# Patient Record
Sex: FEMALE | Race: WHITE | Employment: PART TIME | URBAN - METROPOLITAN AREA
[De-identification: names, ages, dates, MRNs, and addresses within clinical notes are randomized per-mention and may not be internally consistent; named-entity substitution may affect disease eponyms.]

---
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
## 2019-06-18 ENCOUNTER — OFFICE VISIT (OUTPATIENT)
Dept: FAMILY MEDICINE CLINIC | Age: 24
End: 2019-06-18
Payer: COMMERCIAL

## 2019-06-18 VITALS
HEIGHT: 62 IN | TEMPERATURE: 99 F | SYSTOLIC BLOOD PRESSURE: 92 MMHG | HEART RATE: 129 BPM | OXYGEN SATURATION: 98 % | BODY MASS INDEX: 23.59 KG/M2 | DIASTOLIC BLOOD PRESSURE: 66 MMHG | WEIGHT: 128.2 LBS

## 2019-06-18 DIAGNOSIS — R10.84 GENERALIZED ABDOMINAL PAIN: Primary | ICD-10-CM

## 2019-06-18 DIAGNOSIS — R63.4 WEIGHT LOSS: ICD-10-CM

## 2019-06-18 DIAGNOSIS — R30.0 DYSURIA: ICD-10-CM

## 2019-06-18 DIAGNOSIS — R19.7 DIARRHEA, UNSPECIFIED TYPE: ICD-10-CM

## 2019-06-18 LAB
BILIRUBIN, POC: NORMAL
BLOOD URINE, POC: NORMAL
CLARITY, POC: CLEAR
COLOR, POC: YELLOW
GLUCOSE URINE, POC: NORMAL
KETONES, POC: NORMAL
LEUKOCYTE EST, POC: NORMAL
NITRITE, POC: NORMAL
PH, POC: 5.5
PROTEIN, POC: NORMAL
SPECIFIC GRAVITY, POC: >=1.03
UROBILINOGEN, POC: 0.2

## 2019-06-18 PROCEDURE — 99202 OFFICE O/P NEW SF 15 MIN: CPT | Performed by: NURSE PRACTITIONER

## 2019-06-18 PROCEDURE — G8427 DOCREV CUR MEDS BY ELIG CLIN: HCPCS | Performed by: NURSE PRACTITIONER

## 2019-06-18 PROCEDURE — G8420 CALC BMI NORM PARAMETERS: HCPCS | Performed by: NURSE PRACTITIONER

## 2019-06-18 PROCEDURE — 1036F TOBACCO NON-USER: CPT | Performed by: NURSE PRACTITIONER

## 2019-06-18 PROCEDURE — 81002 URINALYSIS NONAUTO W/O SCOPE: CPT | Performed by: NURSE PRACTITIONER

## 2019-06-18 RX ORDER — DICYCLOMINE HYDROCHLORIDE 10 MG/1
10 CAPSULE ORAL 4 TIMES DAILY PRN
Qty: 30 CAPSULE | Refills: 0 | Status: SHIPPED | OUTPATIENT
Start: 2019-06-18 | End: 2019-09-20

## 2019-06-18 RX ORDER — TOPIRAMATE 50 MG/1
TABLET, FILM COATED ORAL
Refills: 5 | COMMUNITY
Start: 2019-06-01 | End: 2019-07-01 | Stop reason: SDUPTHER

## 2019-06-18 RX ORDER — MEDROXYPROGESTERONE ACETATE 150 MG/ML
150 INJECTION, SUSPENSION INTRAMUSCULAR
COMMUNITY
End: 2020-03-23

## 2019-06-18 ASSESSMENT — PATIENT HEALTH QUESTIONNAIRE - PHQ9
1. LITTLE INTEREST OR PLEASURE IN DOING THINGS: 0
SUM OF ALL RESPONSES TO PHQ QUESTIONS 1-9: 0
2. FEELING DOWN, DEPRESSED OR HOPELESS: 0
SUM OF ALL RESPONSES TO PHQ9 QUESTIONS 1 & 2: 0
SUM OF ALL RESPONSES TO PHQ QUESTIONS 1-9: 0

## 2019-06-18 ASSESSMENT — ENCOUNTER SYMPTOMS
RESPIRATORY NEGATIVE: 1
BLOOD IN STOOL: 0
NAUSEA: 0
FLATUS: 0
VOMITING: 0
HEMATOCHEZIA: 0
DIARRHEA: 1
ABDOMINAL PAIN: 1
BELCHING: 0
COUGH: 0
CONSTIPATION: 1
BLOATING: 0

## 2019-06-18 NOTE — PROGRESS NOTES
Saray Headings   26 y.o.  female  [id number]      Chief Complaint   Patient presents with    Abdominal Pain     x 2 days    Diarrhea        Subjective:  23 y. o.female is here for a follow up. She has the following chronic/acute medical problems:  Patient Active Problem List   Diagnosis    36 weeks gestation of pregnancy       Patient states she has had stomach pains with diarrhea after eating for four months. Over the last couple of days she has had the abdominal pain and diarrhea for the last two days. She has had no appetite and feels as if she is losing weight. Patient states she has lost 10 pounds. Abdominal Pain   This is a new problem. The current episode started more than 1 month ago (4 months ago). The onset quality is undetermined. Episode frequency: started intermittantly but feels as if constanct at this point. The problem has been gradually worsening. The pain is located in the generalized abdominal region. The pain is at a severity of 8/10. The quality of the pain is cramping. The abdominal pain does not radiate. Associated symptoms include constipation (sometimes), diarrhea, dysuria, a fever (per patient low grade), frequency and weight loss (per patient). Pertinent negatives include no anorexia, arthralgias, belching, flatus, headaches, hematochezia, hematuria, melena, myalgias, nausea or vomiting. Associated symptoms comments: Patient states when she wipes she will see blood on the toilet paper. . Exacerbated by: eating. The pain is relieved by nothing. She has tried nothing for the symptoms. Diarrhea    This is a new problem. The current episode started more than 1 month ago (4 months). Episode frequency: started after eating today she has went 12 times. The problem has been gradually worsening. The stool consistency is described as watery. The patient states that diarrhea does not awaken her from sleep.  Associated symptoms include abdominal pain, a fever (per patient low grade) and weight Urinalysis no Micro  - URINE CULTURE          Medications Discontinued During This Encounter   Medication Reason    ibuprofen (ADVIL;MOTRIN) 800 MG tablet LIST CLEANUP    Prenatal w/o A Vit-Fe Fum-FA (PRENATA PO) LIST CLEANUP           Care discussed with patient. Questions answered. Patient verbalizes understanding and agrees with plan. After visit summary provided. Advised to call for any problems, questions, or concerns. Return if symptoms worsen or fail to improve.                                               Signed:  MICAH Solano CNP  06/20/19  10:24 AM

## 2019-06-20 LAB — URINE CULTURE, ROUTINE: NORMAL

## 2019-06-23 RX ORDER — DOXYCYCLINE HYCLATE 100 MG/1
100 CAPSULE ORAL 2 TIMES DAILY
COMMUNITY
End: 2019-09-20

## 2019-07-01 ENCOUNTER — TELEPHONE (OUTPATIENT)
Dept: FAMILY MEDICINE CLINIC | Age: 24
End: 2019-07-01

## 2019-07-01 RX ORDER — TOPIRAMATE 50 MG/1
100 TABLET, FILM COATED ORAL EVERY EVENING
Qty: 60 TABLET | Refills: 0 | Status: SHIPPED | OUTPATIENT
Start: 2019-07-01 | End: 2019-07-30 | Stop reason: SDUPTHER

## 2019-07-01 NOTE — TELEPHONE ENCOUNTER
Requested Prescriptions     Signed Prescriptions Disp Refills    topiramate (TOPAMAX) 50 MG tablet 60 tablet 0     Sig: Take 2 tablets by mouth every evening     Authorizing Provider: Bryce Barber     Ordering User: Savanah Perea

## 2019-07-30 RX ORDER — TOPIRAMATE 50 MG/1
100 TABLET, FILM COATED ORAL EVERY EVENING
Qty: 60 TABLET | Refills: 1 | Status: SHIPPED | OUTPATIENT
Start: 2019-07-30 | End: 2019-09-20 | Stop reason: SDUPTHER

## 2019-09-20 ENCOUNTER — OFFICE VISIT (OUTPATIENT)
Dept: FAMILY MEDICINE CLINIC | Age: 24
End: 2019-09-20
Payer: COMMERCIAL

## 2019-09-20 VITALS
SYSTOLIC BLOOD PRESSURE: 104 MMHG | HEART RATE: 76 BPM | DIASTOLIC BLOOD PRESSURE: 72 MMHG | WEIGHT: 125 LBS | BODY MASS INDEX: 22.15 KG/M2 | HEIGHT: 63 IN

## 2019-09-20 DIAGNOSIS — K58.9 IRRITABLE BOWEL SYNDROME, UNSPECIFIED TYPE: ICD-10-CM

## 2019-09-20 DIAGNOSIS — R63.4 WEIGHT LOSS, NON-INTENTIONAL: ICD-10-CM

## 2019-09-20 DIAGNOSIS — G43.109 MIGRAINE AURA WITHOUT HEADACHE: Primary | ICD-10-CM

## 2019-09-20 DIAGNOSIS — L70.9 ACNE, UNSPECIFIED ACNE TYPE: ICD-10-CM

## 2019-09-20 PROCEDURE — G8420 CALC BMI NORM PARAMETERS: HCPCS | Performed by: FAMILY MEDICINE

## 2019-09-20 PROCEDURE — 1036F TOBACCO NON-USER: CPT | Performed by: FAMILY MEDICINE

## 2019-09-20 PROCEDURE — 99214 OFFICE O/P EST MOD 30 MIN: CPT | Performed by: FAMILY MEDICINE

## 2019-09-20 PROCEDURE — G8427 DOCREV CUR MEDS BY ELIG CLIN: HCPCS | Performed by: FAMILY MEDICINE

## 2019-09-20 RX ORDER — TOPIRAMATE 50 MG/1
100 TABLET, FILM COATED ORAL EVERY EVENING
Qty: 180 TABLET | Refills: 1 | Status: SHIPPED | OUTPATIENT
Start: 2019-09-20 | End: 2020-03-23 | Stop reason: SDUPTHER

## 2019-09-20 RX ORDER — CLINDAMYCIN PHOSPHATE 10 MG/G
GEL TOPICAL
Qty: 30 G | Refills: 3 | Status: SHIPPED | OUTPATIENT
Start: 2019-09-20 | End: 2020-03-23 | Stop reason: SDUPTHER

## 2019-09-20 ASSESSMENT — ENCOUNTER SYMPTOMS
SHORTNESS OF BREATH: 0
SINUS PRESSURE: 0
RHINORRHEA: 0
CHEST TIGHTNESS: 0
SORE THROAT: 0

## 2020-03-23 ENCOUNTER — OFFICE VISIT (OUTPATIENT)
Dept: FAMILY MEDICINE CLINIC | Age: 25
End: 2020-03-23
Payer: COMMERCIAL

## 2020-03-23 VITALS
BODY MASS INDEX: 22.15 KG/M2 | WEIGHT: 125 LBS | DIASTOLIC BLOOD PRESSURE: 60 MMHG | HEART RATE: 76 BPM | SYSTOLIC BLOOD PRESSURE: 94 MMHG | HEIGHT: 63 IN

## 2020-03-23 PROCEDURE — 99214 OFFICE O/P EST MOD 30 MIN: CPT | Performed by: FAMILY MEDICINE

## 2020-03-23 RX ORDER — DICYCLOMINE HYDROCHLORIDE 10 MG/1
10 CAPSULE ORAL 2 TIMES DAILY PRN
Qty: 40 CAPSULE | Refills: 3 | Status: SHIPPED | OUTPATIENT
Start: 2020-03-23 | End: 2020-07-29

## 2020-03-23 RX ORDER — CLINDAMYCIN PHOSPHATE 10 MG/G
GEL TOPICAL
Qty: 30 G | Refills: 3
Start: 2020-03-23 | End: 2020-03-30

## 2020-03-23 RX ORDER — SUMATRIPTAN 50 MG/1
50 TABLET, FILM COATED ORAL
Qty: 9 TABLET | Refills: 3 | Status: SHIPPED | OUTPATIENT
Start: 2020-03-23 | End: 2020-07-29

## 2020-03-23 RX ORDER — TOPIRAMATE 50 MG/1
100 TABLET, FILM COATED ORAL EVERY EVENING
Qty: 180 TABLET | Refills: 1
Start: 2020-03-23 | End: 2020-03-31 | Stop reason: SDUPTHER

## 2020-03-23 ASSESSMENT — PATIENT HEALTH QUESTIONNAIRE - PHQ9
1. LITTLE INTEREST OR PLEASURE IN DOING THINGS: 0
SUM OF ALL RESPONSES TO PHQ9 QUESTIONS 1 & 2: 0
SUM OF ALL RESPONSES TO PHQ QUESTIONS 1-9: 0
SUM OF ALL RESPONSES TO PHQ QUESTIONS 1-9: 0
2. FEELING DOWN, DEPRESSED OR HOPELESS: 0

## 2020-03-23 NOTE — PROGRESS NOTES
strain: Not on file    Food insecurity     Worry: Not on file     Inability: Not on file    Transportation needs     Medical: Not on file     Non-medical: Not on file   Tobacco Use    Smoking status: Never Smoker    Smokeless tobacco: Never Used   Substance and Sexual Activity    Alcohol use: No    Drug use: No    Sexual activity: Yes     Partners: Male   Lifestyle    Physical activity     Days per week: Not on file     Minutes per session: Not on file    Stress: Not on file   Relationships    Social connections     Talks on phone: Not on file     Gets together: Not on file     Attends Zoroastrianism service: Not on file     Active member of club or organization: Not on file     Attends meetings of clubs or organizations: Not on file     Relationship status: Not on file    Intimate partner violence     Fear of current or ex partner: Not on file     Emotionally abused: Not on file     Physically abused: Not on file     Forced sexual activity: Not on file   Other Topics Concern    Not on file   Social History Narrative    Not on file        SURGICAL HISTORY  Past Surgical History:   Procedure Laterality Date    APPENDECTOMY  08/2017    LYMPHADENECTOMY      TONSILLECTOMY         CURRENT MEDICATIONS  Current Outpatient Medications   Medication Sig Dispense Refill    SUMAtriptan (IMITREX) 50 MG tablet Take 1 tablet by mouth once as needed for Migraine 9 tablet 3    dicyclomine (BENTYL) 10 MG capsule Take 1 capsule by mouth 2 times daily as needed (abd cramps) 40 capsule 3    clindamycin (CLEOCIN-T) 1 % gel Apply topically at night after washing 30 g 3    topiramate (TOPAMAX) 50 MG tablet Take 2 tablets by mouth every evening 180 tablet 1     No current facility-administered medications for this visit.         ALLERGIES  No Known Allergies    PHYSICAL EXAM    BP 94/60   Pulse 76   Ht 5' 3\" (1.6 m)   Wt 125 lb (56.7 kg)   BMI 22.14 kg/m²     Constitutional:  Well developed, well nourished  HEENT: Normocephalic, atraumatic, bilateral external ears normal, oropharynx moist, nose normal  Neck:  Normal range of motion, no tenderness, supple  Lymphatic:  No lymphadenopathy noted  Cardiovascular:  Normal heart rate, S1S2 nl  Thorax & Lungs:  Normal breath sounds, no respiratory distress, no wheezing  Skin:  Warm, dry, no erythema, no rash  Back:  straight  Extremities:  No edema, no tenderness, no cyanosis  Musculoskeletal:  Good range of motion   Neurologic:  Alert & oriented X 3      ASSESSMENT & PLAN    1. Migraine aura without headache  Continue Topamax. Patient doing well without significant side effects. Add a migraine abortive agent as needed    - SUMAtriptan (IMITREX) 50 MG tablet; Take 1 tablet by mouth once as needed for Migraine  Dispense: 9 tablet; Refill: 3  - topiramate (TOPAMAX) 50 MG tablet; Take 2 tablets by mouth every evening  Dispense: 180 tablet; Refill: 1    2. Irritable bowel syndrome, unspecified type  Stop soda pop. Decrease caffeine. Had a PRN Bentyl  - dicyclomine (BENTYL) 10 MG capsule; Take 1 capsule by mouth 2 times daily as needed (abd cramps)  Dispense: 40 capsule; Refill: 3    3. Acne vulgaris  Issue controlled. Continue meds. Refilled meds. - clindamycin (CLEOCIN-T) 1 % gel;  Apply topically at night after washing  Dispense: 30 g; Refill: 3    Follow-up 6 months       Electronically signed by Blue Epperson MD on 3/24/2020

## 2020-03-31 RX ORDER — TOPIRAMATE 50 MG/1
100 TABLET, FILM COATED ORAL EVERY EVENING
Qty: 180 TABLET | Refills: 1 | Status: SHIPPED | OUTPATIENT
Start: 2020-03-31 | End: 2020-07-29

## 2020-07-16 RX ORDER — CLINDAMYCIN PHOSPHATE 10 MG/G
GEL TOPICAL
Qty: 30 G | Refills: 0 | Status: SHIPPED | OUTPATIENT
Start: 2020-07-16 | End: 2020-07-29

## 2020-09-10 RX ORDER — CLINDAMYCIN PHOSPHATE 10 MG/G
GEL TOPICAL
Qty: 30 G | Refills: 0 | Status: SHIPPED | OUTPATIENT
Start: 2020-09-10 | End: 2020-09-14

## 2020-09-10 NOTE — TELEPHONE ENCOUNTER
Requested Prescriptions     Signed Prescriptions Disp Refills    clindamycin (CLINDAGEL) 1 % gel 30 g 0     Sig: APPLY TOPICALLY AT NIGHT AFTER WASHING     Authorizing Provider: Rae Aranda     Ordering User: Desiree Swan

## 2020-09-14 ENCOUNTER — TELEMEDICINE (OUTPATIENT)
Dept: FAMILY MEDICINE CLINIC | Age: 25
End: 2020-09-14
Payer: COMMERCIAL

## 2020-09-14 PROCEDURE — 99213 OFFICE O/P EST LOW 20 MIN: CPT | Performed by: FAMILY MEDICINE

## 2020-09-14 RX ORDER — CLINDAMYCIN PHOSPHATE 10 MG/G
GEL TOPICAL
Qty: 30 G | Refills: 5
Start: 2020-09-14 | End: 2020-09-20 | Stop reason: ALTCHOICE

## 2020-09-14 NOTE — PROGRESS NOTES
9/14/2020    [address]    Chief Complaint   Patient presents with    6 Month Follow-Up    Other     no c/o's       HPI    Aissatou Pitts is a 23 y.o. female who presents today with follow-up. REVIEW OF SYSTEMS    Constitutional:  Denies fever, chills, weight loss or weakness  Eyes:  no photophobia or discharge  ENT:  no sore throat or ear pain  Cardiovascular:  Denies chest pain, palpitations or swelling  Respiratory:  Denies cough or shortness of breath  GI:  no abdominal pain, nausea, vomiting, or diarrhea  Musculoskeletal:  no back pain  Skin:  No rashes  Neurologic:  no headache, focal weakness, or sensory changes  Endocrine:  no polyuria or polydipsia      PAST MEDICAL HISTORY  No past medical history on file.     FAMILY HISTORY  Family History   Problem Relation Age of Onset    Heart Disease Mother         pacemaker   Madlyn Guard Migraines Mother     Coronary Art Dis Other         GRANDFATHER AND GREAT GRANDFATHER (NOT SPECIFIED)    Diabetes Other         GRANDMOTHER - NON INSULIN DEPENDNET       SOCIAL HISTORY  Social History     Socioeconomic History    Marital status: Single     Spouse name: Not on file    Number of children: Not on file    Years of education: Not on file    Highest education level: Not on file   Occupational History    Not on file   Social Needs    Financial resource strain: Not on file    Food insecurity     Worry: Not on file     Inability: Not on file    Transportation needs     Medical: Not on file     Non-medical: Not on file   Tobacco Use    Smoking status: Never Smoker    Smokeless tobacco: Never Used   Substance and Sexual Activity    Alcohol use: No    Drug use: No    Sexual activity: Yes     Partners: Male   Lifestyle    Physical activity     Days per week: Not on file     Minutes per session: Not on file    Stress: Not on file   Relationships    Social connections     Talks on phone: Not on file     Gets together: Not on file     Attends Episcopalian service: Not on file     Active member of club or organization: Not on file     Attends meetings of clubs or organizations: Not on file     Relationship status: Not on file    Intimate partner violence     Fear of current or ex partner: Not on file     Emotionally abused: Not on file     Physically abused: Not on file     Forced sexual activity: Not on file   Other Topics Concern    Not on file   Social History Narrative    Not on file        SURGICAL HISTORY  Past Surgical History:   Procedure Laterality Date    APPENDECTOMY  08/2017    [address]  No current outpatient medications on file. No current facility-administered medications for this visit. ALLERGIES  No Known Allergies    PHYSICAL EXAM    There were no vitals taken for this visit. Constitutional:  Well developed, well nourished  HEENT:  Normocephalic, atraumatic, bilateral external ears normal, oropharynx moist, nose normal  Neck:  Normal range of motion, no tenderness, supple  Lymphatic:  No lymphadenopathy noted  Cardiovascular:  Normal heart rate, S1S2 nl  Thorax & Lungs:  Normal breath sounds, no respiratory distress, no wheezing  Skin:  Warm, dry, no erythema, no rash  Back:  straight  Extremities:  No edema, no tenderness, no cyanosis  Musculoskeletal:  Good range of motion   Neurologic:  Alert & oriented X 3      ASSESSMENT & PLAN    There are no diagnoses linked to this encounter.          Electronically signed by Vince Cerna MD on 9/14/2020

## 2020-09-20 ENCOUNTER — HOSPITAL ENCOUNTER (EMERGENCY)
Age: 25
Discharge: HOME | End: 2020-09-20
Attending: EMERGENCY MEDICINE
Payer: COMMERCIAL

## 2020-09-20 VITALS
OXYGEN SATURATION: 99 % | HEIGHT: 63 IN | HEART RATE: 68 BPM | DIASTOLIC BLOOD PRESSURE: 60 MMHG | TEMPERATURE: 97.7 F | SYSTOLIC BLOOD PRESSURE: 122 MMHG | WEIGHT: 125 LBS | BODY MASS INDEX: 22.15 KG/M2 | RESPIRATION RATE: 12 BRPM

## 2020-09-20 LAB
BACTERIA: ABNORMAL /HPF
BILIRUBIN URINE: NEGATIVE MG/DL
BLOOD, URINE: NEGATIVE
CAST TYPE: ABNORMAL /HPF
CLARITY: CLEAR
COLOR: YELLOW
CRYSTAL TYPE: NEGATIVE /HPF
EPITHELIAL CELLS, UA: 6 /HPF
GLUCOSE, URINE: NEGATIVE MG/DL
INTERPRETATION: NORMAL
KETONES, URINE: NEGATIVE MG/DL
LEUKOCYTE ESTERASE, URINE: NEGATIVE
NITRITE URINE, QUANTITATIVE: NEGATIVE
PH, URINE: 6 (ref 5–8)
PREGNANCY, URINE: NEGATIVE
PROTEIN UA: NEGATIVE MG/DL
RBC URINE: 0 /HPF (ref 0–6)
SPECIFIC GRAVITY UA: 1.03 (ref 1–1.03)
SPECIFIC GRAVITY, URINE: 1.03 (ref 1–1.03)
UROBILINOGEN, URINE: 0.2 MG/DL (ref 0.2–1)
WBC UA: 1 /HPF (ref 0–5)

## 2020-09-20 PROCEDURE — 99284 EMERGENCY DEPT VISIT MOD MDM: CPT

## 2020-09-20 PROCEDURE — 81001 URINALYSIS AUTO W/SCOPE: CPT

## 2020-09-20 PROCEDURE — 81025 URINE PREGNANCY TEST: CPT

## 2020-09-20 RX ORDER — DOCUSATE SODIUM 100 MG/1
100 CAPSULE, LIQUID FILLED ORAL 2 TIMES DAILY
Qty: 30 CAPSULE | Refills: 0 | Status: SHIPPED | OUTPATIENT
Start: 2020-09-20 | End: 2021-02-09

## 2020-09-20 RX ORDER — DICYCLOMINE HYDROCHLORIDE 10 MG/1
10 CAPSULE ORAL 3 TIMES DAILY
Qty: 15 CAPSULE | Refills: 3 | Status: SHIPPED | OUTPATIENT
Start: 2020-09-20 | End: 2021-02-09

## 2020-09-20 RX ORDER — POLYETHYLENE GLYCOL 3350 17 G/17G
17 POWDER, FOR SOLUTION ORAL DAILY PRN
Qty: 527 G | Refills: 1 | Status: SHIPPED | OUTPATIENT
Start: 2020-09-20 | End: 2020-10-20

## 2020-09-20 ASSESSMENT — PAIN DESCRIPTION - PROGRESSION: CLINICAL_PROGRESSION: GRADUALLY WORSENING

## 2020-09-20 ASSESSMENT — PAIN DESCRIPTION - ORIENTATION: ORIENTATION: LOWER

## 2020-09-20 ASSESSMENT — PAIN SCALES - GENERAL: PAINLEVEL_OUTOF10: 8

## 2020-09-20 ASSESSMENT — PAIN DESCRIPTION - FREQUENCY: FREQUENCY: CONTINUOUS

## 2020-09-20 ASSESSMENT — PAIN DESCRIPTION - PAIN TYPE: TYPE: ACUTE PAIN

## 2020-09-20 ASSESSMENT — PAIN DESCRIPTION - DESCRIPTORS: DESCRIPTORS: SHARP;STABBING

## 2020-09-20 ASSESSMENT — PAIN DESCRIPTION - LOCATION: LOCATION: ABDOMEN

## 2020-09-20 NOTE — ED PROVIDER NOTES
Emergency Department Encounter    Patient: Jose Angel Yan  MRN: [id number]  : 1995  Date of Evaluation: 2020  ED Provider:  Rivka Serna    Triage Chief Complaint:   Abdominal Pain (lower)    [place]:  Jose Angel Yan is a 23 y.o. female that presents with complaint of lower abdominal cramping, both sides, sometimes down toward her back and toward her rectum. She has been constipated over the last week. She has IBS and will often go between constipation and diarrhea. Her last bowel movement was yesterday and was hard and small. No blood in her stools. No nausea or vomiting. Her the pain started around 10 AM.  No dysuria or hematuria. No flank pain. No fevers. Does not localize to one area. It is 8 out of 10 when it is sharp. Has not taken anything for it. She has not had a period since , states she bled through early August with that. She has not had a normal menstrual cycle since she came off the Depakote shot in September of last year. She did take a pregnancy test at home 2 days ago and it was negative. ROS - see HPI, below listed is current ROS at time of my eval:  10 systems reviewed and negative except as above. History reviewed. No pertinent past medical history.   Past Surgical History:   Procedure Laterality Date    APPENDECTOMY  2017    LYMPHADENECTOMY      TONSILLECTOMY       Family History   Problem Relation Age of Onset    Heart Disease Mother         pacemaker   Mell David Migraines Mother     Coronary Art Dis Other         GRANDFATHER AND GREAT GRANDFATHER (NOT SPECIFIED)    Diabetes Other         GRANDMOTHER - NON INSULIN DEPENDNET     Social History     Socioeconomic History    Marital status: Single     Spouse name: Not on file    Number of children: Not on file    Years of education: Not on file    Highest education level: Not on file   Occupational History    Not on file   Social Needs    Financial resource strain: Not on file    Food insecurity Worry: Not on file     Inability: Not on file    Transportation needs     Medical: Not on file     Non-medical: Not on file   Tobacco Use    Smoking status: Never Smoker    Smokeless tobacco: Never Used   Substance and Sexual Activity    Alcohol use: No    Drug use: No    Sexual activity: Yes     Partners: Male   Lifestyle    Physical activity     Days per week: Not on file     Minutes per session: Not on file    Stress: Not on file   Relationships    Social connections     Talks on phone: Not on file     Gets together: Not on file     Attends Temple service: Not on file     Active member of club or organization: Not on file     Attends meetings of clubs or organizations: Not on file     Relationship status: Not on file    Intimate partner violence     Fear of current or ex partner: Not on file     Emotionally abused: Not on file     Physically abused: Not on file     Forced sexual activity: Not on file   Other Topics Concern    Not on file   Social History Narrative    Not on file     No current facility-administered medications for this encounter. Current Outpatient Medications   Medication Sig Dispense Refill    docusate sodium (COLACE) 100 MG capsule Take 1 capsule by mouth 2 times daily 30 capsule 0    polyethylene glycol (MIRALAX) 17 g packet Take 17 g by mouth daily as needed for Other (Constipation) 527 g 1    dicyclomine (BENTYL) 10 MG capsule Take 1 capsule by mouth 3 times daily As needed for abdominal pain 15 capsule 3     No Known Allergies    Nursing Notes Reviewed    Physical Exam:  ED Triage Vitals [09/20/20 1322]   Enc Vitals Group      /60      Pulse 68      Resp 12      Temp 97.7 °F (36.5 °C)      Temp Source Temporal      SpO2 98 %      Weight 125 lb (56.7 kg)      Height 4' 5\" (1.346 m)      Head Circumference       Peak Flow       Pain Score       Pain Loc       Pain Edu? Excl. in [address]?         My pulse ox interpretation is - normal    General appearance:  No acute distress. Skin:  Warm. Dry. Eye:  Extraocular movements intact. Ears, nose, mouth and throat:  Oral mucosa moist   Neck:  Trachea midline. Extremity:  No swelling. Normal ROM     Heart:  Regular rate and rhythm, normal S1 & S2, no extra heart sounds. Perfusion:  intact  Respiratory:  Lungs clear to auscultation bilaterally. Respirations nonlabored. Abdominal:  Normal bowel sounds. Soft. Nontender to palpation over entire abdomen with deep palpation. No rebound or guarding. Non distended. Neurological:  Alert and oriented          Psychiatric:  Appropriate    I have reviewed and interpreted all of the currently available lab results from this visit (if applicable):  Results for orders placed or performed during the hospital encounter of 09/20/20   Urinalysis   Result Value Ref Range    Color, UA YELLOW YELLOW    Clarity, UA CLEAR CLEAR    Glucose, Urine NEGATIVE NEGATIVE MG/DL    Bilirubin Urine NEGATIVE NEGATIVE MG/DL    Ketones, Urine NEGATIVE NEGATIVE MG/DL    Specific Gravity, UA 1.030 1.001 - 1.035    Blood, Urine NEGATIVE NEGATIVE    pH, Urine 6.0 5.0 - 8.0    Protein, UA NEGATIVE NEGATIVE MG/DL    Urobilinogen, Urine 0.2 0.2 - 1.0 MG/DL    Nitrite Urine, Quantitative NEGATIVE NEGATIVE    Leukocyte Esterase, Urine NEGATIVE NEGATIVE    RBC, UA 0 0 - 6 /HPF    WBC, UA 1 0 - 5 /HPF    Epithelial Cells, UA 6 /HPF    Cast Type NO CAST FORMS SEEN NO CAST FORMS SEEN /HPF    Bacteria, UA OCCASIONAL (A) NEGATIVE /HPF    Crystal Type NEGATIVE NEGATIVE /HPF   Urine HCG, if >12yrs and premenopausal   Result Value Ref Range    Pregnancy, Urine NEGATIVE NEGATIVE    Specific Gravity, Urine 1.030 1.001 - 1.035    Interpretation HCG METHOD LIMITATIONS:       Radiographs (if obtained):  Radiologist's Report Reviewed:  No results found.     EKG (if obtained): (All EKG's are interpreted by myself in the absence of a cardiologist)      MDM:  82-year-old female with history as above presents with concern spelling, as well as words and phrases that may be inappropriate. Efforts were made to edit the dictations.         Armen Trotter MD  09/20/20 [id number]

## 2020-11-30 ENCOUNTER — TELEPHONE (OUTPATIENT)
Dept: FAMILY MEDICINE CLINIC | Age: 25
End: 2020-11-30

## 2020-11-30 RX ORDER — CLINDAMYCIN PHOSPHATE 10 MG/G
GEL TOPICAL
Qty: 30 G | Refills: 5 | Status: SHIPPED | OUTPATIENT
Start: 2020-11-30 | End: 2021-02-09

## 2020-11-30 NOTE — TELEPHONE ENCOUNTER
Could they have a \"Day Supply\" of the clindamycin (CLINDAGEL) 1 % gel ----    Please advise as soon as possible.

## 2020-11-30 NOTE — TELEPHONE ENCOUNTER
Requested Prescriptions     Signed Prescriptions Disp Refills    clindamycin (CLINDAGEL) 1 % gel 30 g 5     Sig: APPLY TOPICALLY AT NIGHT AFTER WASHING     Authorizing Provider: Kumar Mayen     Ordering User: Gloria Lucio

## 2021-02-09 ENCOUNTER — NURSE ONLY (OUTPATIENT)
Dept: CARDIOLOGY CLINIC | Age: 26
End: 2021-02-09
Payer: COMMERCIAL

## 2021-02-09 ENCOUNTER — INITIAL CONSULT (OUTPATIENT)
Dept: CARDIOLOGY CLINIC | Age: 26
End: 2021-02-09
Payer: COMMERCIAL

## 2021-02-09 VITALS
WEIGHT: 136.4 LBS | HEIGHT: 63 IN | HEART RATE: 120 BPM | DIASTOLIC BLOOD PRESSURE: 58 MMHG | SYSTOLIC BLOOD PRESSURE: 98 MMHG | BODY MASS INDEX: 24.17 KG/M2

## 2021-02-09 DIAGNOSIS — R55 SYNCOPE AND COLLAPSE: Primary | ICD-10-CM

## 2021-02-09 DIAGNOSIS — I95.1 ORTHOSTATIC HYPOTENSION: ICD-10-CM

## 2021-02-09 PROCEDURE — G8420 CALC BMI NORM PARAMETERS: HCPCS | Performed by: INTERNAL MEDICINE

## 2021-02-09 PROCEDURE — G8427 DOCREV CUR MEDS BY ELIG CLIN: HCPCS | Performed by: INTERNAL MEDICINE

## 2021-02-09 PROCEDURE — 93000 ELECTROCARDIOGRAM COMPLETE: CPT | Performed by: INTERNAL MEDICINE

## 2021-02-09 PROCEDURE — G8484 FLU IMMUNIZE NO ADMIN: HCPCS | Performed by: INTERNAL MEDICINE

## 2021-02-09 PROCEDURE — 93270 REMOTE 30 DAY ECG REV/REPORT: CPT | Performed by: INTERNAL MEDICINE

## 2021-02-09 PROCEDURE — 99204 OFFICE O/P NEW MOD 45 MIN: CPT | Performed by: INTERNAL MEDICINE

## 2021-02-09 NOTE — ASSESSMENT & PLAN NOTE
Her blood pressure dropped by 20 points when she is in 90s in the office today. Encouraged to increase fluid intake mostly Gatorade avoid caffeine and high sugar fluids.   Encouraged to wear compression stockings 15 to 20 mmHg

## 2021-02-09 NOTE — PROGRESS NOTES
CARDIOLOGY  CONSULT  NOTE    Chief Complaint: Syncope     HPI:   Pricilla Quinn is a 23y.o. year old who has Past medical history as noted below. 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 Mother has a pacemaker due to sick sinus syndrome and SVT which was put in in her 25s  She reports getting diet headed and dizzy when she stands up if she forgets it early and sits down she can odell off the symptoms. She has started increased fluids      Current Outpatient Medications   Medication Sig Dispense Refill    Prenatal MV-Min-Fe Fum-FA-DHA (PRENATAL 1 PO) Take 1 tablet by mouth daily      Compression Stockings MISC by Does not apply route 15 to 20 mmHG 1 each 0     No current facility-administered medications for this visit. Allergies:   Patient has no known allergies. Patient History:  History reviewed. No pertinent past medical history.   Past Surgical History:   Procedure Laterality Date    APPENDECTOMY  08/2017    LYMPHADENECTOMY      TONSILLECTOMY       Family History   Problem Relation Age of Onset    Heart Disease Mother         pacemaker   [employer] Migraines Mother     Coronary Art Dis Other         GRANDFATHER AND GREAT GRANDFATHER (NOT SPECIFIED)    Diabetes Other         GRANDMOTHER - NON INSULIN DEPENDNET     Social History     Tobacco Use    Smoking status: Never Smoker    Smokeless tobacco: Never Used   Substance Use Topics    Alcohol use: No        Review of Systems:   · Constitutional: No Fever or Weight Loss   · Eyes: No Decreased Vision  · ENT: No Headaches, Received infant on 0.125L [address] [facility]; during PO feeding infant noted to be drifting in O2 sats; flow increased back to 0.15L; no drifting in O2 sats noted; did not attempt to wean, patient noted to be intermittently tachypnic. No episodes noted.  Tolerating feeds Hearing Loss or Vertigo  · Cardiovascular: as per note above   · Respiratory: No cough or wheezing and as per note above. · Gastrointestinal: No abdominal pain, appetite loss, blood in stools, constipation, diarrhea or heartburn  · Genitourinary: No dysuria, trouble voiding, or hematuria  · Musculoskeletal:  denies any new  joint aches , swelling  or pain   · Integumentary: No rash or pruritis  · Neurological: No TIA or stroke symptoms  · Psychiatric: No anxiety or depression  · Endocrine: No malaise, fatigue or temperature intolerance  · Hematologic/Lymphatic: No bleeding problems, blood clots or swollen lymph nodes  · Allergic/Immunologic: No nasal congestion or hives    Objective:      Physical Exam:  BP (!) 98/58 (Position: Standing)   Pulse 120   Ht 5' 3\" (1.6 m)   Wt 136 lb 6.4 oz (61.9 kg)   BMI 24.16 kg/m²   Wt Readings from Last 3 Encounters:   02/09/21 136 lb 6.4 oz (61.9 kg)   09/20/20 125 lb (56.7 kg)   07/29/20 131 lb (59.4 kg)     Body mass index is 24.16 kg/m². Vitals:    02/09/21 1041   BP: (!) 98/58   Pulse: 120        General Appearance:  No distress, conversant  Constitutional:  Well developed, Well nourished, No acute distress, Non-toxic appearance. HENT:  Normocephalic, Atraumatic, Bilateral external ears normal, Oropharynx moist, No oral exudates, Nose normal. Neck- Normal range of motion, No tenderness, Supple, No stridor,no apical-carotid delay  Eyes:  PERRL, EOMI, Conjunctiva normal, No discharge. Respiratory:  Normal breath sounds, No respiratory distress, No wheezing, No chest tenderness. ,no use of accessory muscles, NO crackles  Cardiovascular: (PMI) apex non displaced,no lifts no thrills,S1 and S2 audible, No added heart sounds, No signs of ankle edema, or volume overload, No evidence of JVD, No crackles  GI:  Bowel sounds normal, Soft, No tenderness, No masses, No gross visceromegaly   :  No costovertebral angle tenderness   Musculoskeletal:  No edema, no tenderness, no deformities. Back- no tenderness  Integument:  Well hydrated, no rash   Lymphatic:  No lymphadenopathy noted   Neurologic:  Alert & oriented x 3, CN 2-12 normal, normal motor function, normal sensory function, no focal deficits noted   Psychiatric:  Speech and behavior appropriate       Medical decision making and Data review:  DATA:  No results found for: TROPONINT  BNP:  No results found for: PROBNP  PT/INR:  No results found for: PTINR  No results found for: LABA1C  No results found for: CHOL, TRIG, HDL, LDLCALC, LDLDIRECT  No results found for: ALT, AST  No results for input(s): WBC, HGB, HCT, MCV, PLT in the last 72 hours. TSH: No results found for: TSH  No results found for: AST, ALT, ALB, BILIDIR, BILITOT, ALKPHOS  No results found for: PROBNP  No results found for: LABA1C  Lab Results   Component Value Date    WBC 19.1 (H) 09/24/2016    HGB 12.1 (L) 09/24/2016    HCT 36.5 (L) 09/24/2016     09/24/2016     All labs, medications and tests reviewed by myself including data and history from outside source , patient and available family . Assessment & Plan:      1. Syncope and collapse    2. Orthostatic hypotension         Orthostatic hypotension  Her blood pressure dropped by 20 points when she is in 90s in the office today. Encouraged to increase fluid intake mostly Gatorade avoid caffeine and high sugar fluids. Encouraged to wear compression stockings 15 to 20 mmHg    Syncope and collapse  Most probably has orthostatic hypotension causing dizzy spells encouraged to lay down with legs elevated when she has symptoms. Will place 30-day event monitor also get an echo to rule out structural heart disease mother had pacemaker in 25s? We will also get a treadmill to see blood pressure response to exercise and heart rate response        Counseled extensively and medication compliance urged. We discussed that for the  prevention of ASCVD our  goal is aggressive risk modification. Patient is encouraged to

## 2021-02-09 NOTE — LETTER
Maritza Rasmussen  1995  [id number]    Have you had any Chest Pain that is not new? - Yes  If Yes DO EKG - How does it feel - Sharp Pain   How long does the pain last -hours   How long have you been having the pain - Day's       ? DO EKG IF: Patient has a Heart Rate above 100 or below 40     CAD (Coronary Artery Disease) patient should have one on file every 6 months        Have you had any Shortness of Breath - No    Have you had any dizziness - Yes  If Yes DO ORTHOSTATIC BP - when do you feel dizzy Moving to fast, or just sitting   How long does it last .  seconds     ? Sitting wait 5 minutes do supine (laying down) wait 5 minutes then do standing - log each in \"vitals\" area in Epic  ? Be sure to ask what symptoms they are having if they get dizzy while completing ortho stats such as room spinning, nausea, etc.    Have you had any palpitations that are not new?  - No      Do you have any edema - swelling in No          Do you have a surgery or procedure scheduled in the near future - No

## 2021-02-12 ENCOUNTER — TELEPHONE (OUTPATIENT)
Dept: CARDIOLOGY CLINIC | Age: 26
End: 2021-02-12

## 2021-02-12 DIAGNOSIS — R55 SYNCOPE AND COLLAPSE: ICD-10-CM

## 2021-02-12 NOTE — TELEPHONE ENCOUNTER
Called patient and advised her to carla her symptoms on the 30 day monitor as her insurance will not pay for the 24/7 monitoring. She voiced understanding.

## 2021-02-17 ENCOUNTER — HOSPITAL ENCOUNTER (EMERGENCY)
Age: 26
Discharge: HOME | End: 2021-02-17
Attending: EMERGENCY MEDICINE
Payer: COMMERCIAL

## 2021-02-17 VITALS
DIASTOLIC BLOOD PRESSURE: 62 MMHG | HEART RATE: 97 BPM | WEIGHT: 136 LBS | BODY MASS INDEX: 24.1 KG/M2 | TEMPERATURE: 97.9 F | OXYGEN SATURATION: 99 % | HEIGHT: 63 IN | SYSTOLIC BLOOD PRESSURE: 102 MMHG | RESPIRATION RATE: 17 BRPM

## 2021-02-17 DIAGNOSIS — R51.9 ACUTE NONINTRACTABLE HEADACHE, UNSPECIFIED HEADACHE TYPE: Primary | ICD-10-CM

## 2021-02-17 LAB
ANION GAP SERPL CALCULATED.3IONS-SCNC: 9 MMOL/L (ref 4–16)
BUN BLDV-MCNC: 9 MG/DL (ref 6–23)
CALCIUM SERPL-MCNC: 8.8 MG/DL (ref 8.3–10.6)
CHLORIDE BLD-SCNC: 103 MMOL/L (ref 99–110)
CO2: 23 MMOL/L (ref 21–32)
CREAT SERPL-MCNC: 0.6 MG/DL (ref 0.6–1.1)
GFR AFRICAN AMERICAN: >60 ML/MIN/1.73M2
GFR NON-AFRICAN AMERICAN: >60 ML/MIN/1.73M2
GLUCOSE BLD-MCNC: 86 MG/DL (ref 70–99)
POTASSIUM SERPL-SCNC: 3.9 MMOL/L (ref 3.5–5.1)
SODIUM BLD-SCNC: 135 MMOL/L (ref 135–145)

## 2021-02-17 PROCEDURE — 93005 ELECTROCARDIOGRAM TRACING: CPT | Performed by: EMERGENCY MEDICINE

## 2021-02-17 PROCEDURE — 80048 BASIC METABOLIC PNL TOTAL CA: CPT

## 2021-02-17 PROCEDURE — 96374 THER/PROPH/DIAG INJ IV PUSH: CPT

## 2021-02-17 PROCEDURE — 2580000003 HC RX 258: Performed by: EMERGENCY MEDICINE

## 2021-02-17 PROCEDURE — 99283 EMERGENCY DEPT VISIT LOW MDM: CPT

## 2021-02-17 PROCEDURE — 36415 COLL VENOUS BLD VENIPUNCTURE: CPT

## 2021-02-17 PROCEDURE — 6360000002 HC RX W HCPCS: Performed by: EMERGENCY MEDICINE

## 2021-02-17 RX ORDER — 0.9 % SODIUM CHLORIDE 0.9 %
1000 INTRAVENOUS SOLUTION INTRAVENOUS ONCE
Status: COMPLETED | OUTPATIENT
Start: 2021-02-17 | End: 2021-02-17

## 2021-02-17 RX ORDER — METOCLOPRAMIDE 10 MG/1
10 TABLET ORAL
Qty: 9 TABLET | Refills: 0 | Status: SHIPPED | OUTPATIENT
Start: 2021-02-17 | End: 2021-04-02

## 2021-02-17 RX ORDER — METOCLOPRAMIDE HYDROCHLORIDE 5 MG/ML
5 INJECTION INTRAMUSCULAR; INTRAVENOUS ONCE
Status: COMPLETED | OUTPATIENT
Start: 2021-02-17 | End: 2021-02-17

## 2021-02-17 RX ADMIN — METOCLOPRAMIDE 5 MG: 5 INJECTION, SOLUTION INTRAMUSCULAR; INTRAVENOUS at 19:39

## 2021-02-17 RX ADMIN — SODIUM CHLORIDE 1000 ML: 9 INJECTION, SOLUTION INTRAVENOUS at 19:38

## 2021-02-17 ASSESSMENT — PAIN DESCRIPTION - PAIN TYPE: TYPE: ACUTE PAIN

## 2021-02-17 ASSESSMENT — PAIN SCALES - GENERAL: PAINLEVEL_OUTOF10: 8

## 2021-02-17 NOTE — ED PROVIDER NOTES
Emergency Department Encounter    Patient: Carlos Eduardo Silva  MRN: [id number]  : 1995  Date of Evaluation: 2021  ED Provider:  Whit Riley    Triage Chief Complaint:   Headache (x3 days, 14 weeks pregnant)    [place]:  Carlos Eduardo Silva is a 23 y.o. female that presents with complaint of a headache for the last 3 days. She has taken Tylenol without relief. She has a history of migraines and is typically on Topamax but she is 14 weeks pregnant. She called her OB office and was told to come to the ER. She has had multiple syncopal episodes during this pregnancy, she has been worked up for multiple syncopal episodes in the past.  She is supposed to be wearing a Holter monitor but apparently had some sort of reaction to the electrodes and so they are sending her another one in the mail. She had seen cardiology last week. She is not having any chest pain or palpitations. Her headache comes from the back of her head around to the front, when she presses on the muscles of her head it hurts worse. She does have associated photophobia. No neck stiffness. No focal weakness on one side of her body or the other. No numbness or tingling on one side of her body or the other. If she stands too quickly she feels like her head throbs and she feels like her vision will go blurry and she will feel lightheaded. This is why she been referred to cardiology. She has not fallen or hit her head. Headache was not sudden onset. Is not the worst of her life. No slurred speech or difficulty with word finding. No confusion or facial droop. She has not had issues with her blood pressure or her blood sugars. She is not having any edema. No nausea or vomiting. Pain is 8/10. ROS - see HPI, below listed is current ROS at time of my eval:  10 systems reviewed and negative except as above. History reviewed. No pertinent past medical history.   Past Surgical History:   Procedure Laterality Date    APPENDECTOMY  08/2017    LYMPHADENECTOMY      TONSILLECTOMY       Family History   Problem Relation Age of Onset    Heart Disease Mother         pacemaker   Ashly Elizabeth Migraines Mother     Coronary Art Dis Other         GRANDFATHER AND GREAT GRANDFATHER (NOT SPECIFIED)    Diabetes Other         GRANDMOTHER - NON INSULIN DEPENDNET     Social History     Socioeconomic History    Marital status: Single     Spouse name: Not on file    Number of children: Not on file    Years of education: Not on file    Highest education level: Not on file   Occupational History    Not on file   Social Needs    Financial resource strain: Not on file    Food insecurity     Worry: Not on file     Inability: Not on file    Transportation needs     Medical: Not on file     Non-medical: Not on file   Tobacco Use    Smoking status: Never Smoker    Smokeless tobacco: Never Used   Substance and Sexual Activity    Alcohol use: No    Drug use: No    Sexual activity: Yes     Partners: Male   Lifestyle    Physical activity     Days per week: Not on file     Minutes per session: Not on file    Stress: Not on file   Relationships    Social connections     Talks on phone: Not on file     Gets together: Not on file     Attends Latter-day service: Not on file     Active member of club or organization: Not on file     Attends meetings of clubs or organizations: Not on file     Relationship status: Not on file    Intimate partner violence     Fear of current or ex partner: Not on file     Emotionally abused: Not on file     Physically abused: Not on file     Forced sexual activity: Not on file   Other Topics Concern    Not on file   Social History Narrative    Not on file     Current Facility-Administered Medications   Medication Dose Route Frequency Provider Last Rate Last Admin    0.9 % sodium chloride bolus  1,000 mL Intravenous Once Aure Saleh MD 1,000 mL/hr at 02/17/21 1938 1,000 mL at 02/17/21 1938     Current Outpatient Medications   Medication Sig Dispense Refill    Prenatal MV-Min-Fe Fum-FA-DHA (PRENATAL 1 PO) Take 1 tablet by mouth daily      Compression Stockings MISC by Does not apply route 15 to 20 mmHG 1 each 0     No Known Allergies    Nursing Notes Reviewed    Physical Exam:  Triage VS:    ED Triage Vitals [02/17/21 1803]   Enc Vitals Group      /68      Pulse 97      Resp 17      Temp 97.9 °F (36.6 °C)      Temp Source Oral      SpO2 99 %      Weight 136 lb (61.7 kg)      Height 5' 3\" (1.6 m)      Head Circumference       Peak Flow       Pain Score       Pain Loc       Pain Edu? Excl. in [address]? My pulse ox interpretation is - normal    General appearance:  No acute distress. Skin:  Warm. Dry. Eye:  Extraocular movements intact. Ears, nose, mouth and throat:  Oral mucosa moist   Neck:  Trachea midline. Extremity:  No swelling. Normal ROM     Heart:  Regular rate and rhythm, normal S1 & S2, no extra heart sounds. Perfusion:  intact  Respiratory:  Lungs clear to auscultation bilaterally. Respirations nonlabored. Abdominal:  Soft. Nontender. Non distended. Neurological:  Alert and oriented , cranial nerves II through XII grossly intact, strength 5 out of 5 bilateral lower extremities. Sensation intact light touch in bilateral lower extremities. Deep tendon reflexes intact and equal at bilateral patella. Normal finger-nose testing with no ataxia. Normal gait.           Psychiatric:  Appropriate    I have reviewed and interpreted all of the currently available lab results from this visit (if applicable):  Results for orders placed or performed during the hospital encounter of 02/17/21   EKG 12 Lead   Result Value Ref Range    Ventricular Rate 71 BPM    Atrial Rate 71 BPM    P-R Interval 160 ms    QRS Duration 80 ms    Q-T Interval 358 ms    QTc Calculation (Bazett) 389 ms    P Axis 48 degrees    R Axis 82 degrees    T Axis 53 degrees    Diagnosis       Normal sinus rhythm  Normal ECG  No previous ECGs available        Radiographs (if obtained):  Radiologist's Report Reviewed:  No results found. EKG (if obtained): (All EKG's are interpreted by myself in the absence of a cardiologist)  Sinus rhythm with rate of 71 bpm, normal intervals. No ST elevation. Normal EKG. No previous to compare. MDM:  68-year-old female with history as above presents with concern for headache over the last 3 days, she is in her second trimester. It was not sudden onset, she has no meningeal signs. Her neurologic exam is entirely intact. She has a history of migraines. She also has been having lots of syncopal episodes, referred to cardiology, etc. further work-up. I suspect she may be mildly dehydrated, we will check her electrolytes, given fluids and Reglan. I do not believe she requires a scan at this time. We did discuss that headaches are often very common in the second trimester and discussed things to avoid, and close follow-up with her OB as well as her PCP. She is comfortable with this, she is doing well currently. I will put her up pending discharge after her fluids are completed and BMP returned. Clinical Impression:  1. Acute nonintractable headache, unspecified headache type      Disposition referral (if applicable):  Geraldine Pérez MD  [facility] [address]  [phone]    Schedule an appointment as soon as possible for a visit       Dr. Guanakito Hensley          Disposition medications (if applicable):  New Prescriptions    No medications on file     ED Provider Disposition Time  DISPOSITION Discharge - Pending Orders Complete 02/17/2021 08:00:15 PM      Comment: Please note this report has been produced using speech recognition software and may contain errors related to that system including errors in grammar, punctuation, and spelling, as well as words and phrases that may be inappropriate. Efforts were made to edit the dictations.         Vaibhav Marshall MD  02/17/21 2003

## 2021-02-18 PROCEDURE — 93010 ELECTROCARDIOGRAM REPORT: CPT | Performed by: INTERNAL MEDICINE

## 2021-02-23 ENCOUNTER — PROCEDURE VISIT (OUTPATIENT)
Dept: CARDIOLOGY CLINIC | Age: 26
End: 2021-02-23
Payer: COMMERCIAL

## 2021-02-23 DIAGNOSIS — R55 SYNCOPE AND COLLAPSE: Primary | ICD-10-CM

## 2021-02-23 LAB
LV EF: 58 %
LVEF MODALITY: NORMAL

## 2021-02-23 PROCEDURE — 93306 TTE W/DOPPLER COMPLETE: CPT | Performed by: INTERNAL MEDICINE

## 2021-02-24 ENCOUNTER — TELEPHONE (OUTPATIENT)
Dept: CARDIOLOGY CLINIC | Age: 26
End: 2021-02-24

## 2021-02-24 NOTE — TELEPHONE ENCOUNTER
Patient notified of results. Conclusions      Summary   Left ventricular systolic function is normal with an ejection fraction of   55-60%. Normal pulmonary artery pressure with a RVSP of 18mmHg. No significant valvular abnormalities. No evidence of pericardial effusion.       Signature

## 2021-02-25 LAB
EKG ATRIAL RATE: 71 BPM
EKG DIAGNOSIS: NORMAL
EKG P AXIS: 48 DEGREES
EKG P-R INTERVAL: 160 MS
EKG Q-T INTERVAL: 358 MS
EKG QRS DURATION: 80 MS
EKG QTC CALCULATION (BAZETT): 389 MS
EKG R AXIS: 82 DEGREES
EKG T AXIS: 53 DEGREES
EKG VENTRICULAR RATE: 71 BPM

## 2021-02-26 ENCOUNTER — PROCEDURE VISIT (OUTPATIENT)
Dept: CARDIOLOGY CLINIC | Age: 26
End: 2021-02-26
Payer: COMMERCIAL

## 2021-02-26 DIAGNOSIS — R55 SYNCOPE AND COLLAPSE: ICD-10-CM

## 2021-02-26 PROCEDURE — 93015 CV STRESS TEST SUPVJ I&R: CPT | Performed by: INTERNAL MEDICINE

## 2021-03-05 ENCOUNTER — TELEPHONE (OUTPATIENT)
Dept: CARDIOLOGY CLINIC | Age: 26
End: 2021-03-05

## 2021-03-05 NOTE — TELEPHONE ENCOUNTER
Unable to reach patient left voicemail of results. Conclusions      Summary   The patient exercised according to the [facility] for 7:01 min:s, achieving a   work level of Max. METS: 8.50. The resting heart rate of 110 bpm suzanne to a maximal heart rate of 171 bpm.   This value represents 86 % of the   maximal, age-predicted heart rate. The resting blood pressure of 90/60 mmHg   , suzanne to a maximum blood   pressure of 150/60 mmHg. The exercise test was stopped due to MET THR.       Signature

## 2021-03-19 PROCEDURE — 93272 ECG/REVIEW INTERPRET ONLY: CPT | Performed by: INTERNAL MEDICINE

## 2021-03-22 ENCOUNTER — HOSPITAL ENCOUNTER (OUTPATIENT)
Age: 26
Setting detail: SPECIMEN
Discharge: HOME | End: 2021-03-22
Payer: COMMERCIAL

## 2021-03-22 ENCOUNTER — OFFICE VISIT (OUTPATIENT)
Dept: PRIMARY CARE CLINIC | Age: 26
End: 2021-03-22
Payer: COMMERCIAL

## 2021-03-22 VITALS — HEART RATE: 84 BPM | TEMPERATURE: 97.2 F | OXYGEN SATURATION: 97 %

## 2021-03-22 DIAGNOSIS — R05.9 COUGH: Primary | ICD-10-CM

## 2021-03-22 DIAGNOSIS — J02.9 SORE THROAT: ICD-10-CM

## 2021-03-22 DIAGNOSIS — R09.81 NASAL CONGESTION: ICD-10-CM

## 2021-03-22 PROCEDURE — U0002 COVID-19 LAB TEST NON-CDC: HCPCS

## 2021-03-22 PROCEDURE — 99213 OFFICE O/P EST LOW 20 MIN: CPT | Performed by: NURSE PRACTITIONER

## 2021-03-22 PROCEDURE — G8420 CALC BMI NORM PARAMETERS: HCPCS | Performed by: NURSE PRACTITIONER

## 2021-03-22 PROCEDURE — 1036F TOBACCO NON-USER: CPT | Performed by: NURSE PRACTITIONER

## 2021-03-22 PROCEDURE — 87880 STREP A ASSAY W/OPTIC: CPT | Performed by: NURSE PRACTITIONER

## 2021-03-22 PROCEDURE — G8484 FLU IMMUNIZE NO ADMIN: HCPCS | Performed by: NURSE PRACTITIONER

## 2021-03-22 PROCEDURE — G8427 DOCREV CUR MEDS BY ELIG CLIN: HCPCS | Performed by: NURSE PRACTITIONER

## 2021-03-22 NOTE — PATIENT INSTRUCTIONS
Your COVID 19 test can take 3-5 days for the results to come back. We ask that you make a Mychart page and view your test results this way. You will need to Self quarantine until you know your results. Increase fluids and rest  Saline nasal spray as needed for nasal congestion  Warm salt gargles as needed for throat discomfort  Monitor temperature twice a day  Tylenol as needed for fevers and/or discomfort. Big deep breaths periodically throughout the day  Regular Mucinex over the counter as needed for chest congestion  If symptoms worsen -Go to the ER. Follow up with your primary care provider      To Whom it May Concern:    Natacha Boyd was tested for COVID-19 3/22/2021. He/she must stay home until test results are back. If test is positive, he/she must quarantine for a total of 10 days starting from day one of symptom onset. He/she must also be fever-free for 24 hours at that time, and also have improvement in symptoms. We do not recommend retesting as patients may continue to test positive for months even though no longer contagious. It is suggested you call [email or web address] or  [facility] [place] with any questions regarding quarantine timeframe/return to work/school details.

## 2021-03-22 NOTE — PROGRESS NOTES
3/22/21  Skylar Santiago  1995    FLU/COVID-19 CLINIC EVALUATION    HPI SYMPTOMS:    Employer:    [] Fevers  [] Chills  [x] Cough  [] Coughing up blood  [x] Chest Congestion  [x] Nasal Congestion  [] Feeling short of breath  [] Sometimes  [] Frequently  [] All the time  [] Chest pain  [] Headaches  []Tolerable  [] Severe  [x] Sore throat  [] Muscle aches  [] Nausea  [] Vomiting  []Unable to keep fluids down  [] Diarrhea  []Severe    [] OTHER SYMPTOMS:      Symptom Duration:   [] 1  [] 2   [x] 3   [] 4    [] 5   [] 6   [] 7   [] 8   [] 9   [] 10   [] 11   [] 12   [] 13   [] 14   [] Longer than 14 days    Symptom course:   [x] Worsening     [] Stable     [] Improving    RISK FACTORS:    [x] Pregnant or possibly pregnant  [] Age over 61  [] Diabetes  [] Heart disease  [] Asthma  [] COPD/Other chronic lung diseases  [] Active Cancer  [] On Chemotherapy  [] Taking oral steroids  [] History Lymphoma/Leukemia  [] Close contact with a lab confirmed COVID-19 patient within 14 days of symptom onset  [] History of travel from affected geographical areas within 14 days of symptom onset       VITALS:  There were no vitals filed for this visit. TESTS:    POCT FLU:  [] Positive     []Negative    ASSESSMENT:    [] Flu  [] Possible COVID-19  [] Strep    PLAN:    [] Discharge home with written instructions for:  [] Flu management  [] Possible COVID-19 infection with self-quarantine and management of symptoms  [] Follow-up with primary care physician or emergency department if worsens  [] Evaluation per physician/NP/PA in clinic  [] Sent to ER       An  electronic signature was used to authenticate this note.      --Quinten Daley MA on 3/22/2021 at 1:28 PM
Health Maintenance   Topic Date Due    Hepatitis C screen  Never done    Varicella vaccine (1 of 2 - 2-dose childhood series) Never done    COVID-19 Vaccine (1) Never done    Chlamydia screen  04/16/2017    Cervical cancer screen  Never done    Flu vaccine (1) 09/01/2020    DTaP/Tdap/Td vaccine (7 - Td) 09/21/2020    Hib vaccine  Completed    HPV vaccine  Completed    HIV screen  Completed    Hepatitis A vaccine  Aged Out    Hepatitis B vaccine  Aged Out    Meningococcal (ACWY) vaccine  Aged Out    Pneumococcal 0-64 years Vaccine  Aged Out       PHYSICAL EXAM:  Physical Exam  Constitutional:       Appearance: Normal appearance. HENT:      Head: Normocephalic. Right Ear: Tympanic membrane, ear canal and external ear normal.      Left Ear: Tympanic membrane, ear canal and external ear normal.      Nose: Congestion present. Mouth/Throat:      Lips: Pink. Mouth: Mucous membranes are moist.      Pharynx: Posterior oropharyngeal erythema present. Neck:      Musculoskeletal: Neck supple. Cardiovascular:      Rate and Rhythm: Normal rate and regular rhythm. Heart sounds: Normal heart sounds. Pulmonary:      Effort: Pulmonary effort is normal.      Breath sounds: Normal breath sounds. Skin:     General: Skin is warm and dry. Neurological:      Mental Status: She is alert and oriented to person, place, and time. Psychiatric:         Mood and Affect: Mood normal.         Behavior: Behavior normal.         ASSESSMENT/PLAN:  1. Cough  Your COVID 19 test can take 3-5 days for the results to come back. We ask that you make a Mychart page and view your test results this way. You will need to Self quarantine until you know your results. Increase fluids and rest  Saline nasal spray as needed for nasal congestion  Warm salt gargles as needed for throat discomfort  Monitor temperature twice a day  Tylenol as needed for fevers and/or discomfort.    Big deep breaths periodically

## 2021-03-23 LAB
SARS-COV-2: NOT DETECTED
SOURCE: NORMAL

## 2021-04-02 ENCOUNTER — OFFICE VISIT (OUTPATIENT)
Dept: FAMILY MEDICINE CLINIC | Age: 26
End: 2021-04-02
Payer: COMMERCIAL

## 2021-04-02 VITALS
WEIGHT: 138 LBS | TEMPERATURE: 97.3 F | SYSTOLIC BLOOD PRESSURE: 108 MMHG | HEIGHT: 65 IN | DIASTOLIC BLOOD PRESSURE: 64 MMHG | BODY MASS INDEX: 22.99 KG/M2 | HEART RATE: 76 BPM

## 2021-04-02 DIAGNOSIS — Z34.92 NORMAL PREGNANCY IN SECOND TRIMESTER: Primary | ICD-10-CM

## 2021-04-02 DIAGNOSIS — Z00.00 HEALTH MAINTENANCE EXAMINATION: ICD-10-CM

## 2021-04-02 PROCEDURE — 99395 PREV VISIT EST AGE 18-39: CPT | Performed by: FAMILY MEDICINE

## 2021-04-02 SDOH — ECONOMIC STABILITY: TRANSPORTATION INSECURITY
IN THE PAST 12 MONTHS, HAS THE LACK OF TRANSPORTATION KEPT YOU FROM MEDICAL APPOINTMENTS OR FROM GETTING MEDICATIONS?: NO

## 2021-04-02 SDOH — ECONOMIC STABILITY: FOOD INSECURITY: WITHIN THE PAST 12 MONTHS, THE FOOD YOU BOUGHT JUST DIDN'T LAST AND YOU DIDN'T HAVE MONEY TO GET MORE.: NEVER TRUE

## 2021-04-02 ASSESSMENT — PATIENT HEALTH QUESTIONNAIRE - PHQ9
2. FEELING DOWN, DEPRESSED OR HOPELESS: 0
SUM OF ALL RESPONSES TO PHQ QUESTIONS 1-9: 0
SUM OF ALL RESPONSES TO PHQ QUESTIONS 1-9: 0

## 2021-04-02 NOTE — PROGRESS NOTES
4/2/2021    [facility] [address]     Chief Complaint   Patient presents with    Other     school physical       HPI    Aurelia Gracia is a 23 y.o. female who presents today with follow-up. 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 She has no complaints today. She is 20 weeks pregnant with her second child. Her first child Natacha Hall is 11years old. REVIEW OF SYSTEMS    Constitutional:  Denies fever, chills, weight loss or weakness  Eyes:  no photophobia or discharge  ENT:  no sore throat or ear pain  Cardiovascular:  Denies chest pain, palpitations or swelling  Respiratory:  Denies cough or shortness of breath  GI:  no abdominal pain, nausea, vomiting, or diarrhea  Musculoskeletal:  no back pain  Skin:  No rashes  Neurologic:  no headache, focal weakness, or sensory changes  Endocrine:  no polyuria or polydipsia      PAST MEDICAL HISTORY  Past Medical History:   Diagnosis Date    H/O cardiovascular stress test 02/26/2021    normal stress    H/O echocardiogram 02/23/2021    EF 55-60% no evidence of pericardial effusion no significant valvular abnormalities       FAMILY HISTORY  Family History   Problem Relation Age of Onset    Heart Disease Mother         pacemaker   Emily Aline Migraines Mother     Coronary Art Dis Other         GRANDFATHER AND GREAT GRANDFATHER (NOT SPECIFIED)    Diabetes Other         GRANDMOTHER - NON INSULIN DEPENDNET       SOCIAL HISTORY  Social History     Socioeconomic History    Marital status: Single     Spouse name: None    Number of children: None    Years of education: None    Highest education level: None   Occupational History    None   Social Needs    Financial resource strain: Not hard at all   Nakul-Ritesh insecurity     Worry: Never true     Inability: Never true    Transportation needs     Medical: No     Non-medical: No   Tobacco Use    Smoking status: Never Smoker    Smokeless tobacco: Never Used   Substance and Sexual Activity    Alcohol use:  No  Drug use: No    Sexual activity: Yes     Partners: Male   Lifestyle    Physical activity     Days per week: None     Minutes per session: None    Stress: None   Relationships    Social connections     Talks on phone: None     Gets together: None     Attends Alevism service: None     Active member of club or organization: None     Attends meetings of clubs or organizations: None     Relationship status: None    Intimate partner violence     Fear of current or ex partner: None     Emotionally abused: None     Physically abused: None     Forced sexual activity: None   Other Topics Concern    None   Social History Narrative    None        SURGICAL HISTORY  Past Surgical History:   Procedure Laterality Date    APPENDECTOMY  08/2017    LYMPHADENECTOMY      TONSILLECTOMY         CURRENT MEDICATIONS  Current Outpatient Medications   Medication Sig Dispense Refill    Prenatal MV-Min-Fe Fum-FA-DHA (PRENATAL 1 PO) Take 1 tablet by mouth daily      Compression Stockings MISC by Does not apply route 15 to 20 mmHG 1 each 0     No current facility-administered medications for this visit. ALLERGIES  No Known Allergies    PHYSICAL EXAM  /64   Pulse 76   Temp 97.3 °F (36.3 °C)   Ht 5' 4.5\" (1.638 m)   Wt 138 lb (62.6 kg)   LMP 07/12/2020 (Approximate)   BMI 23.32 kg/m²     ASSESSMENT & PLAN    1. Normal pregnancy in second trimester  Doing well. No complaints. Remain on prenatal vitamins. Continue not smoking. 2. Health maintenance examination  Forms completed. No labs needed at this time.     Follow-up  As needed    Electronically signed by Valdo Owens MD on 4/2/2021

## 2021-04-05 ENCOUNTER — OFFICE VISIT (OUTPATIENT)
Dept: CARDIOLOGY CLINIC | Age: 26
End: 2021-04-05
Payer: COMMERCIAL

## 2021-04-05 VITALS
HEIGHT: 63 IN | DIASTOLIC BLOOD PRESSURE: 46 MMHG | TEMPERATURE: 97.3 F | WEIGHT: 137 LBS | SYSTOLIC BLOOD PRESSURE: 104 MMHG | BODY MASS INDEX: 24.27 KG/M2 | HEART RATE: 86 BPM

## 2021-04-05 DIAGNOSIS — R55 SYNCOPE AND COLLAPSE: ICD-10-CM

## 2021-04-05 DIAGNOSIS — I95.1 ORTHOSTATIC HYPOTENSION: Primary | ICD-10-CM

## 2021-04-05 PROCEDURE — G8427 DOCREV CUR MEDS BY ELIG CLIN: HCPCS | Performed by: INTERNAL MEDICINE

## 2021-04-05 PROCEDURE — 1036F TOBACCO NON-USER: CPT | Performed by: INTERNAL MEDICINE

## 2021-04-05 PROCEDURE — G8420 CALC BMI NORM PARAMETERS: HCPCS | Performed by: INTERNAL MEDICINE

## 2021-04-05 PROCEDURE — 99214 OFFICE O/P EST MOD 30 MIN: CPT | Performed by: INTERNAL MEDICINE

## 2021-04-05 NOTE — LETTER
Lisa Pump    Dr. Melina Condon  1995  [id number]    Have you had any Chest Pain that is not new? - No        Have you had any Shortness of Breath - Yes  If Yes - When on exertion, even just standing do the dishes    Have you had any dizziness - No    Have you had any palpitations that are not new? - Yes  If Yes DO EKG - Do you feel your heart racing and pounding  How long does it last - .2  minutes if she sits down and rests, if she continues to be active it continues  Do you have any edema - swelling in feet    If Yes - CHECK TO SEE IF THE EDEMA IS PITTING  How long have they been having edema - Years  If Yes - Have they worn compression stockings Yes  If they have worn compression stockings 1 Months    Vein \"LEG PROBLEM Questionnaire\"  1. Do you have prominent leg veins? No   2. Do you have any skin discoloration? No  3. Do you have any healed or active sores? No  4. Do you have swelling of the legs? No  5. Do you have a family history of varicose veins? No  6. Does your profession involve pro-longed        standing or heavy lifting? No  7. Have you been fighting overweight problems? No  8. Do you have restless legs? Yes  9. Do you have any night time cramps? Yes  10. Do you have any of the following in your legs:        none     When did you have your last labs drawn Feb 2021    If we do not have these labs you are retrieve these labs for these providers!     Do you have a surgery or procedure scheduled in the near future - No

## 2021-04-05 NOTE — PROGRESS NOTES
CARDIOLOGY    NOTE    Chief Complaint: Syncope     HPI:   Tejal Kapadia is a 23y.o. year old who has Past medical history as noted below. Tejal Kapadia is currently pregnant this is her second pregnancy her first pregnancy was terminated 6 weeks due to IUGR she was seen for recurrent dizzy spells and presyncope for last several weeks she is currently 22 weeks pregnant reports  several episodes of syncope she has had 6 episodes of almost passing out. After her last visit she was encouraged to wear compression stockings and increase fluid intake which is certainly helped but not completely resolved her problems stress test echo and 30-day event monitor did not show any significant abnormality except for episodes of sinus tachycardia  She is a nursing student   Mother has a pacemaker due to sick sinus syndrome and SVT which was put in in her 25s        Current Outpatient Medications   Medication Sig Dispense Refill    Prenatal MV-Min-Fe Fum-FA-DHA (PRENATAL 1 PO) Take 1 tablet by mouth daily      Compression Stockings MISC by Does not apply route 15 to 20 mmHG 1 each 0     No current facility-administered medications for this visit. Allergies:   Patient has no known allergies.     Patient History:  Past Medical History:   Diagnosis Date    H/O cardiovascular stress test 02/26/2021    normal stress    H/O echocardiogram 02/23/2021    EF 55-60% no evidence of pericardial effusion no significant valvular abnormalities     Past Surgical History:   Procedure Laterality Date    APPENDECTOMY  08/2017    LYMPHADENECTOMY      TONSILLECTOMY       Family History   Problem Relation Age of Onset    Heart Disease Mother         pacemaker   [employer] Migraines Mother     Coronary Art Dis Other         GRANDFATHER AND GREAT GRANDFATHER (NOT SPECIFIED)    Diabetes Other         GRANDMOTHER - NON INSULIN DEPENDNET     Social History     Tobacco Use    Smoking status: Never Smoker    Smokeless tobacco: Never Used   Substance Use Topics    Alcohol use: No        Review of Systems:   · Constitutional: No Fever or Weight Loss   · Eyes: No Decreased Vision  · ENT: No Headaches, Hearing Loss or Vertigo  · Cardiovascular: as per note above   · Respiratory: No cough or wheezing and as per note above. · Gastrointestinal: No abdominal pain, appetite loss, blood in stools, constipation, diarrhea or heartburn  · Genitourinary: No dysuria, trouble voiding, or hematuria  · Musculoskeletal:  denies any new  joint aches , swelling  or pain   · Integumentary: No rash or pruritis  · Neurological: No TIA or stroke symptoms  · Psychiatric: No anxiety or depression  · Endocrine: No malaise, fatigue or temperature intolerance  · Hematologic/Lymphatic: No bleeding problems, blood clots or swollen lymph nodes  · Allergic/Immunologic: No nasal congestion or hives    Objective:      Physical Exam:  BP (!) 104/46 (Site: Left Upper Arm, Position: Sitting, Cuff Size: Medium Adult)   Pulse 86   Temp 97.3 °F (36.3 °C)   Ht 5' 3\" (1.6 m)   Wt 137 lb (62.1 kg)   LMP 07/12/2020 (Approximate)   BMI 24.27 kg/m²   Wt Readings from Last 3 Encounters:   04/05/21 137 lb (62.1 kg)   04/02/21 138 lb (62.6 kg)   02/17/21 136 lb (61.7 kg)     Body mass index is 24.27 kg/m². Vitals:    04/05/21 1438   BP: (!) 104/46   Pulse:    Temp:         General Appearance:  No distress, conversant  Constitutional:  Well developed, Well nourished, No acute distress, Non-toxic appearance. HENT:  Normocephalic, Atraumatic, Bilateral external ears normal, Oropharynx moist, No oral exudates, Nose normal. Neck- Normal range of motion, No tenderness, Supple, No stridor,no apical-carotid delay  Eyes:  PERRL, EOMI, Conjunctiva normal, No discharge. Respiratory:  Normal breath sounds, No respiratory distress, No wheezing, No chest tenderness. ,no use of accessory muscles, NO crackles  Cardiovascular: (PMI) apex non displaced,no lifts no thrills,S1 and S2 audible, No added heart sounds, No signs of ankle edema, or volume overload, No evidence of JVD, No crackles  GI:  Bowel sounds normal, Soft, No tenderness, No masses, No gross visceromegaly   :  No costovertebral angle tenderness   Musculoskeletal:  No edema, no tenderness, no deformities. Back- no tenderness  Integument:  Well hydrated, no rash   Lymphatic:  No lymphadenopathy noted   Neurologic:  Alert & oriented x 3, CN 2-12 normal, normal motor function, normal sensory function, no focal deficits noted   Psychiatric:  Speech and behavior appropriate       Medical decision making and Data review:  DATA:  No results found for: TROPONINT  BNP:  No results found for: PROBNP  PT/INR:  No results found for: PTINR  No results found for: LABA1C  No results found for: CHOL, TRIG, HDL, LDLCALC, LDLDIRECT  No results found for: ALT, AST  No results for input(s): WBC, HGB, HCT, MCV, PLT in the last 72 hours. TSH: No results found for: TSH  No results found for: AST, ALT, ALB, BILIDIR, BILITOT, ALKPHOS  No results found for: PROBNP  No results found for: LABA1C  Lab Results   Component Value Date    WBC 19.1 (H) 09/24/2016    HGB 12.1 (L) 09/24/2016    HCT 36.5 (L) 09/24/2016     09/24/2016     All labs, medications and tests reviewed by myself including data and history from outside source , patient and available family . Assessment & Plan:      1. Orthostatic hypotension    2. Syncope and collapse         Orthostatic hypotension  Her blood pressure dropped by 20 points when she is in 90s in the office . Encouraged to increase fluid intake mostly Gatorade avoid caffeine and high sugar fluids. Encouraged to wear compression stockings 15 to 20 mmHg encouraged to continue walking and gentle exercise  sHe was counseled extensively    Syncope and collapse  Most probably has orthostatic hypotension causing dizzy spells encouraged to lay down with legs elevated when she has symptoms.   She had appropriate heart rate and blood pressure response on treadmill echo showed was normal.  30-day event monitor shows episodes of sinus tachycardia    Counseled extensively and medication compliance urged. Various goals were discussed and questions answered. Continue current medications. Appropriate prescriptions are addressed and refills ordered. Questions answered and patient verbalizes understanding. Call for any problems, questions, or concerns. Continue all other medications of all above medical condition listed as is. Return in about 3 months (around 7/5/2021). Please note this report has been partially produced using speech recognition software and may contain errors related to that system including errors in grammar, punctuation, and spelling, as well as words and phrases that may be inappropriate. If there are any questions or concerns please feel free to contact the dictating provider for clarification. Single

## 2021-04-15 ENCOUNTER — TELEPHONE (OUTPATIENT)
Dept: FAMILY MEDICINE CLINIC | Age: 26
End: 2021-04-15

## 2021-04-15 NOTE — TELEPHONE ENCOUNTER
To Sarah--    Do we have any more flu vaccines letf? Patient is interested in getting a flu shot. Please advise.

## 2021-06-18 ENCOUNTER — HOSPITAL ENCOUNTER (OUTPATIENT)
Age: 26
Discharge: HOME | End: 2021-06-18
Attending: OBSTETRICS & GYNECOLOGY | Admitting: OBSTETRICS & GYNECOLOGY
Payer: COMMERCIAL

## 2021-06-18 VITALS
RESPIRATION RATE: 14 BRPM | SYSTOLIC BLOOD PRESSURE: 117 MMHG | OXYGEN SATURATION: 99 % | HEART RATE: 81 BPM | DIASTOLIC BLOOD PRESSURE: 58 MMHG | TEMPERATURE: 98.4 F

## 2021-06-18 PROBLEM — O26.93 PREGNANCY RELATED CONDITION IN THIRD TRIMESTER: Status: ACTIVE | Noted: 2021-06-18

## 2021-06-18 LAB
AMPHETAMINES: NEGATIVE
BACTERIA: ABNORMAL /HPF
BARBITURATE SCREEN URINE: NEGATIVE
BENZODIAZEPINE SCREEN, URINE: NEGATIVE
BILIRUBIN URINE: NEGATIVE MG/DL
BLOOD, URINE: ABNORMAL
CANNABINOID SCREEN URINE: NEGATIVE
CLARITY: ABNORMAL
COCAINE METABOLITE: NEGATIVE
COLOR: YELLOW
FETAL FIBRONECTIN: NEGATIVE
GLUCOSE, URINE: NEGATIVE MG/DL
KETONES, URINE: NEGATIVE MG/DL
LEUKOCYTE ESTERASE, URINE: ABNORMAL
MUCUS: ABNORMAL HPF
NITRITE URINE, QUANTITATIVE: NEGATIVE
OPIATES, URINE: NEGATIVE
OXYCODONE: NEGATIVE
PH, URINE: 6 (ref 5–8)
PHENCYCLIDINE, URINE: NEGATIVE
PROTEIN UA: NEGATIVE MG/DL
RBC URINE: 20 /HPF (ref 0–6)
SPECIFIC GRAVITY UA: 1.02 (ref 1–1.03)
SQUAMOUS EPITHELIAL: 7 /HPF
TRICHOMONAS: ABNORMAL /HPF
UROBILINOGEN, URINE: NEGATIVE MG/DL (ref 0.2–1)
WBC UA: 9 /HPF (ref 0–5)

## 2021-06-18 PROCEDURE — 87086 URINE CULTURE/COLONY COUNT: CPT

## 2021-06-18 PROCEDURE — 81001 URINALYSIS AUTO W/SCOPE: CPT

## 2021-06-18 PROCEDURE — 99211 OFF/OP EST MAY X REQ PHY/QHP: CPT

## 2021-06-18 PROCEDURE — 82731 ASSAY OF FETAL FIBRONECTIN: CPT

## 2021-06-18 PROCEDURE — 80307 DRUG TEST PRSMV CHEM ANLYZR: CPT

## 2021-06-18 NOTE — FLOWSHEET NOTE
Pt presents to triage with complaints of abdominal cramping and vaginal pain. Pt shown to LT05 and oriented to room. Pt denies sexual intercourse in the last 24 hours and FFN was collected. SVE performed with pt's consent. Pt provided with water. Call light within reach.

## 2021-06-18 NOTE — FLOWSHEET NOTE
Tele  advised of pt UA results orders received to send urine for culture, advised lab [facility] of new order.

## 2021-06-18 NOTE — FLOWSHEET NOTE
EFM And TOCO Off discharge instructions given to follow up this coming week in office or return to L/D this week to continue pelvic rest and progesterone injections as scheduled with office, pt voices understanding, preparing for discharge, paper signed.

## 2021-06-18 NOTE — FLOWSHEET NOTE
EFM And TOCO on per DEANGELO Sandra RN pt states baby has been active and moving , denies any vaginal leaking or bleeding, denies any tender spots to touch on abdomen, pt states takes progesterone shots with home care and was advised per nurse to come to L/D for check.

## 2021-06-19 LAB
CULTURE: NORMAL
Lab: NORMAL
SPECIMEN: NORMAL

## 2021-06-24 ENCOUNTER — HOSPITAL ENCOUNTER (OUTPATIENT)
Age: 26
Discharge: HOME | End: 2021-06-24
Attending: OBSTETRICS & GYNECOLOGY | Admitting: OBSTETRICS & GYNECOLOGY
Payer: COMMERCIAL

## 2021-06-24 VITALS
HEART RATE: 88 BPM | DIASTOLIC BLOOD PRESSURE: 59 MMHG | TEMPERATURE: 98.6 F | SYSTOLIC BLOOD PRESSURE: 103 MMHG | OXYGEN SATURATION: 97 % | RESPIRATION RATE: 16 BRPM

## 2021-06-24 PROBLEM — Z33.1 PREGNANCY AS INCIDENTAL FINDING: Status: ACTIVE | Noted: 2021-06-24

## 2021-06-24 LAB
ALBUMIN SERPL-MCNC: 3.7 GM/DL (ref 3.4–5)
ALP BLD-CCNC: 97 IU/L (ref 40–129)
ALT SERPL-CCNC: 8 U/L (ref 10–40)
ANION GAP SERPL CALCULATED.3IONS-SCNC: 8 MMOL/L (ref 4–16)
AST SERPL-CCNC: 11 IU/L (ref 15–37)
BILIRUB SERPL-MCNC: 0.2 MG/DL (ref 0–1)
BUN BLDV-MCNC: 6 MG/DL (ref 6–23)
CALCIUM SERPL-MCNC: 8.6 MG/DL (ref 8.3–10.6)
CHLORIDE BLD-SCNC: 102 MMOL/L (ref 99–110)
CO2: 22 MMOL/L (ref 21–32)
CREAT SERPL-MCNC: 0.3 MG/DL (ref 0.6–1.1)
CREATININE 24 HOUR UR: 1.1 GM/24 HR (ref 0.6–1.5)
CREATININE URINE: 94.5 MG/DL (ref 28–217)
GFR AFRICAN AMERICAN: >60 ML/MIN/1.73M2
GFR NON-AFRICAN AMERICAN: >60 ML/MIN/1.73M2
GLUCOSE BLD-MCNC: 78 MG/DL (ref 70–99)
HCT VFR BLD CALC: 34.1 % (ref 37–47)
HEMOGLOBIN: 11.2 GM/DL (ref 12.5–16)
Lab: 24 HRS
Lab: 24 HRS
MCH RBC QN AUTO: 30.7 PG (ref 27–31)
MCHC RBC AUTO-ENTMCNC: 32.8 % (ref 32–36)
MCV RBC AUTO: 93.4 FL (ref 78–100)
PDW BLD-RTO: 13.2 % (ref 11.7–14.9)
PLATELET # BLD: 182 K/CU MM (ref 140–440)
PMV BLD AUTO: 10.8 FL (ref 7.5–11.1)
POTASSIUM SERPL-SCNC: 4 MMOL/L (ref 3.5–5.1)
PROT/CREAT RATIO, UR: 0.1
PROTEIN 24 HOUR URINE: 115 MG/24 HR
RBC # BLD: 3.65 M/CU MM (ref 4.2–5.4)
SODIUM BLD-SCNC: 132 MMOL/L (ref 135–145)
TOTAL PROTEIN: 5.4 GM/DL (ref 6.4–8.2)
URIC ACID: 3 MG/DL (ref 2.6–6)
URINE TOTAL PROTEIN: 9.9 MG/DL
VOLUME, (UVOL): 1190 MLS
VOLUME, (UVOL): 1190 MLS
WBC # BLD: 11 K/CU MM (ref 4–10.5)

## 2021-06-24 PROCEDURE — 80053 COMPREHEN METABOLIC PANEL: CPT

## 2021-06-24 PROCEDURE — 85027 COMPLETE CBC AUTOMATED: CPT

## 2021-06-24 PROCEDURE — 99211 OFF/OP EST MAY X REQ PHY/QHP: CPT

## 2021-06-24 PROCEDURE — 84550 ASSAY OF BLOOD/URIC ACID: CPT

## 2021-06-24 PROCEDURE — 81050 URINALYSIS VOLUME MEASURE: CPT

## 2021-06-24 PROCEDURE — 82570 ASSAY OF URINE CREATININE: CPT

## 2021-06-24 PROCEDURE — 84156 ASSAY OF PROTEIN URINE: CPT

## 2021-06-24 NOTE — FLOWSHEET NOTE
Presents to L/D ambulatory to return 24 hour urine and [address] lab work, shown to  LT 04 advised on POC FOB at bedside supportive.

## 2021-06-24 NOTE — FLOWSHEET NOTE
on unit advised of pt presence on unit  And reason for  Being here, drop off 24 hour urine, Pre -E labs and P/C ratio lab perimeters given to call if abnormal other wise if lbs WNL, baby reactive, derial B/P WNL pt may be discharged and follow up as scheduled or sooner as needed.

## 2021-06-24 NOTE — FLOWSHEET NOTE
EFM and TOCO on pt states  Baby has been active and moving denies any vaginal leaking or bleeding, denies any tender spots to touch on abdomen, denies HA or blurred vision or epigastric discomfort  POC discussed regarding lab work, apple juice and water given FOB at bedside supportive.

## 2021-06-24 NOTE — FLOWSHEET NOTE
Tele  labs reviewed,  Advised of one late deceleration on monitoring strip advised been more than 30 minutes from late and reactive tracing since orders received for discharge and follow up as scheduled or sooner as needed.

## 2021-07-01 ENCOUNTER — OFFICE VISIT (OUTPATIENT)
Dept: CARDIOLOGY CLINIC | Age: 26
End: 2021-07-01
Payer: COMMERCIAL

## 2021-07-01 VITALS
WEIGHT: 145 LBS | OXYGEN SATURATION: 98 % | HEIGHT: 63 IN | HEART RATE: 80 BPM | DIASTOLIC BLOOD PRESSURE: 71 MMHG | SYSTOLIC BLOOD PRESSURE: 118 MMHG | BODY MASS INDEX: 25.69 KG/M2

## 2021-07-01 DIAGNOSIS — I95.1 ORTHOSTATIC HYPOTENSION: ICD-10-CM

## 2021-07-01 DIAGNOSIS — R55 SYNCOPE AND COLLAPSE: Primary | ICD-10-CM

## 2021-07-01 PROCEDURE — G8427 DOCREV CUR MEDS BY ELIG CLIN: HCPCS | Performed by: NURSE PRACTITIONER

## 2021-07-01 PROCEDURE — 1036F TOBACCO NON-USER: CPT | Performed by: NURSE PRACTITIONER

## 2021-07-01 PROCEDURE — 99213 OFFICE O/P EST LOW 20 MIN: CPT | Performed by: NURSE PRACTITIONER

## 2021-07-01 PROCEDURE — G8419 CALC BMI OUT NRM PARAM NOF/U: HCPCS | Performed by: NURSE PRACTITIONER

## 2021-07-01 ASSESSMENT — ENCOUNTER SYMPTOMS
COUGH: 0
SHORTNESS OF BREATH: 0

## 2021-07-01 NOTE — PROGRESS NOTES
kg)   Height: 5' 3\" (1.6 m)       Wt Readings from Last 3 Encounters:   07/01/21 145 lb (65.8 kg)   04/05/21 137 lb (62.1 kg)   04/02/21 138 lb (62.6 kg)       BP Readings from Last 3 Encounters:   07/01/21 118/71   06/24/21 (!) 103/59   06/18/21 (!) 117/58       Prior to Admission medications    Medication Sig Start Date End Date Taking? Authorizing Provider   PROGESTERONE IM Inject into the muscle   Yes Historical Provider, MD   Prenatal MV-Min-Fe Fum-FA-DHA (PRENATAL 1 PO) Take 1 tablet by mouth daily   Yes Historical Provider, MD   Compression Stockings MISC by Does not apply route 15 to 20 mmHG 2/9/21  Yes Lisa Negro MD       Physical Exam  Vitals reviewed. Constitutional:       General: She is not in acute distress. Appearance: Normal appearance. She is not ill-appearing. HENT:      Head: Atraumatic. Neck:      Vascular: No carotid bruit. Cardiovascular:      Rate and Rhythm: Normal rate and regular rhythm. Pulses: Normal pulses. Heart sounds: Normal heart sounds. No murmur heard. Pulmonary:      Effort: Pulmonary effort is normal. No respiratory distress. Breath sounds: Normal breath sounds. Musculoskeletal:         General: No swelling or deformity. Cervical back: Neck supple. No muscular tenderness. Neurological:      Mental Status: She is alert. Health Maintenance   Topic Date Due    Hepatitis C screen  Never done    COVID-19 Vaccine (1) Never done    Varicella vaccine (1 of 2 - 2-dose childhood series) Never done    Chlamydia screen  04/16/2017    Cervical cancer screen  Never done    DTaP/Tdap/Td vaccine (7 - Td or Tdap) 09/21/2020    Flu vaccine (1) 09/01/2021    Hib vaccine  Completed    HPV vaccine  Completed    HIV screen  Completed    Hepatitis A vaccine  Aged Out    Hepatitis B vaccine  Aged Out    Meningococcal (ACWY) vaccine  Aged Out    Pneumococcal 0-64 years Vaccine  Aged Out     ASSESSMENT/PLAN:    1.  Syncope and collapse  Assessment & Plan:   no episodes since last OV  Continue to stay hydrated and wear compression stockings. 2. Orthostatic hypotension  Assessment & Plan:   Borderline controlled, lifestyle modifications recommended and continue to monitor   Stay hydrated  3. 33 weeks gestation  Cardiology available if needed peripartum  She is planned for induction in the next 2 weeks. No follow-ups on file. An electronic signature was used to authenticate this note.     Electronically signed by MICAH Coronado CNP on 7/1/2021 at 2:17 PM

## 2021-07-08 ENCOUNTER — HOSPITAL ENCOUNTER (OUTPATIENT)
Age: 26
Discharge: HOME | End: 2021-07-08
Attending: OBSTETRICS & GYNECOLOGY | Admitting: OBSTETRICS & GYNECOLOGY
Payer: COMMERCIAL

## 2021-07-08 VITALS
RESPIRATION RATE: 16 BRPM | HEART RATE: 100 BPM | SYSTOLIC BLOOD PRESSURE: 106 MMHG | DIASTOLIC BLOOD PRESSURE: 59 MMHG | TEMPERATURE: 98.4 F

## 2021-07-08 LAB
AMORPHOUS: ABNORMAL /HPF
AMPHETAMINES: NEGATIVE
BACTERIA: ABNORMAL /HPF
BARBITURATE SCREEN URINE: NEGATIVE
BENZODIAZEPINE SCREEN, URINE: NEGATIVE
BILIRUBIN URINE: NEGATIVE MG/DL
BLOOD, URINE: NEGATIVE
CANNABINOID SCREEN URINE: NEGATIVE
CLARITY: ABNORMAL
COCAINE METABOLITE: NEGATIVE
COLOR: YELLOW
GLUCOSE, URINE: NEGATIVE MG/DL
KETONES, URINE: NEGATIVE MG/DL
LEUKOCYTE ESTERASE, URINE: NEGATIVE
MUCUS: ABNORMAL HPF
NITRITE URINE, QUANTITATIVE: NEGATIVE
OPIATES, URINE: NEGATIVE
OXYCODONE: NEGATIVE
PH, URINE: 8 (ref 5–8)
PHENCYCLIDINE, URINE: NEGATIVE
PROTEIN UA: NEGATIVE MG/DL
RBC URINE: <1 /HPF (ref 0–6)
SPECIFIC GRAVITY UA: 1.02 (ref 1–1.03)
SQUAMOUS EPITHELIAL: 1 /HPF
TRICHOMONAS: ABNORMAL /HPF
UROBILINOGEN, URINE: NEGATIVE MG/DL (ref 0.2–1)
WBC UA: <1 /HPF (ref 0–5)

## 2021-07-08 PROCEDURE — 84112 EVAL AMNIOTIC FLUID PROTEIN: CPT

## 2021-07-08 PROCEDURE — 81001 URINALYSIS AUTO W/SCOPE: CPT

## 2021-07-08 PROCEDURE — 80307 DRUG TEST PRSMV CHEM ANLYZR: CPT

## 2021-07-08 PROCEDURE — 99211 OFF/OP EST MAY X REQ PHY/QHP: CPT

## 2021-07-08 NOTE — FLOWSHEET NOTE
presents amb to unit for c/o leaking fluid. States saw a small puddle on the floor after using BR. States didn't smell like urine. Pt states as recently having intercourse. POC discussed. UA obtained and sent to lab. Steve Shore sure done.

## 2021-07-08 NOTE — FLOWSHEET NOTE
TR to  and notified of DANYA, c/o, recent VE, neg. Amni sure, UA results, occ contraction, and FHR reactive. Orders received to discharge to home and follow up as scheduled.

## 2021-07-08 NOTE — FLOWSHEET NOTE
EFM off discharge instructions given and explained. Instructed to follow up in office as scheduled. Pt verbalized understanding and left amb from unit for home without distress.

## 2021-07-14 ENCOUNTER — HOSPITAL ENCOUNTER (OUTPATIENT)
Age: 26
Setting detail: SPECIMEN
Discharge: HOME | End: 2021-07-14
Payer: COMMERCIAL

## 2021-07-14 PROCEDURE — 82575 CREATININE CLEARANCE TEST: CPT

## 2021-07-14 PROCEDURE — 84156 ASSAY OF PROTEIN URINE: CPT

## 2021-07-15 ENCOUNTER — HOSPITAL ENCOUNTER (OUTPATIENT)
Age: 26
Discharge: HOME | End: 2021-07-15
Attending: OBSTETRICS & GYNECOLOGY | Admitting: OBSTETRICS & GYNECOLOGY
Payer: COMMERCIAL

## 2021-07-15 VITALS — HEART RATE: 90 BPM | SYSTOLIC BLOOD PRESSURE: 110 MMHG | DIASTOLIC BLOOD PRESSURE: 64 MMHG | RESPIRATION RATE: 16 BRPM

## 2021-07-15 PROBLEM — Z78.9 ADMITTED TO LABOR AND DELIVERY: Status: ACTIVE | Noted: 2021-07-15

## 2021-07-15 LAB
ALBUMIN SERPL-MCNC: 4 GM/DL (ref 3.4–5)
ALP BLD-CCNC: 139 IU/L (ref 40–129)
ALT SERPL-CCNC: 12 U/L (ref 10–40)
ANION GAP SERPL CALCULATED.3IONS-SCNC: 10 MMOL/L (ref 4–16)
AST SERPL-CCNC: 13 IU/L (ref 15–37)
BASOPHILS ABSOLUTE: 0 K/CU MM
BASOPHILS RELATIVE PERCENT: 0.2 % (ref 0–1)
BILIRUB SERPL-MCNC: 0.2 MG/DL (ref 0–1)
BUN BLDV-MCNC: 7 MG/DL (ref 6–23)
CALCIUM SERPL-MCNC: 8.5 MG/DL (ref 8.3–10.6)
CHLORIDE BLD-SCNC: 99 MMOL/L (ref 99–110)
CO2: 21 MMOL/L (ref 21–32)
CREAT SERPL-MCNC: 0.4 MG/DL (ref 0.6–1.1)
CREATININE CLEARANCE: 195 ML/MIN (ref 88–128)
CREATININE URINE: 109.7 MG/DL (ref 28–217)
CREATININE URINE: 54.8 MG/DL (ref 28–217)
DIFFERENTIAL TYPE: ABNORMAL
EOSINOPHILS ABSOLUTE: 0.1 K/CU MM
EOSINOPHILS RELATIVE PERCENT: 0.9 % (ref 0–3)
FIBRINOGEN LEVEL: 458 MG/DL (ref 196.9–442.1)
GFR AFRICAN AMERICAN: >60 ML/MIN/1.73M2
GFR NON-AFRICAN AMERICAN: >60 ML/MIN/1.73M2
GLUCOSE BLD-MCNC: 78 MG/DL (ref 70–99)
HCT VFR BLD CALC: 35.2 % (ref 37–47)
HEIGHT, INCHES: ABNORMAL INCHES
HEMOGLOBIN: 11.9 GM/DL (ref 12.5–16)
IMMATURE NEUTROPHIL %: 1.8 % (ref 0–0.43)
LACTATE DEHYDROGENASE: 141 IU/L (ref 120–246)
LYMPHOCYTES ABSOLUTE: 2.4 K/CU MM
LYMPHOCYTES RELATIVE PERCENT: 16.7 % (ref 24–44)
Lab: 24 HRS
MCH RBC QN AUTO: 31.3 PG (ref 27–31)
MCHC RBC AUTO-ENTMCNC: 33.8 % (ref 32–36)
MCV RBC AUTO: 92.6 FL (ref 78–100)
MONOCYTES ABSOLUTE: 1.2 K/CU MM
MONOCYTES RELATIVE PERCENT: 8.5 % (ref 0–4)
NUCLEATED RBC %: 0 %
PATIENT WEIGHT (LBS): ABNORMAL LBS
PDW BLD-RTO: 13.2 % (ref 11.7–14.9)
PLATELET # BLD: 202 K/CU MM (ref 140–440)
PMV BLD AUTO: 11.2 FL (ref 7.5–11.1)
POTASSIUM SERPL-SCNC: 4 MMOL/L (ref 3.5–5.1)
PROT/CREAT RATIO, UR: 0.2
PROTEIN 24 HOUR URINE: 130 MG/24 HR
RBC # BLD: 3.8 M/CU MM (ref 4.2–5.4)
SEGMENTED NEUTROPHILS ABSOLUTE COUNT: 10.1 K/CU MM
SEGMENTED NEUTROPHILS RELATIVE PERCENT: 71.9 % (ref 36–66)
SODIUM BLD-SCNC: 130 MMOL/L (ref 135–145)
TOTAL IMMATURE NEUTOROPHIL: 0.25 K/CU MM
TOTAL NUCLEATED RBC: 0 K/CU MM
TOTAL PROTEIN: 5.7 GM/DL (ref 6.4–8.2)
URIC ACID: 2.6 MG/DL (ref 2.6–6)
URINE TOTAL PROTEIN: 18.3 MG/DL
VOLUME, (UVOL): 2000 MLS
WBC # BLD: 14 K/CU MM (ref 4–10.5)

## 2021-07-15 PROCEDURE — 82570 ASSAY OF URINE CREATININE: CPT

## 2021-07-15 PROCEDURE — 84550 ASSAY OF BLOOD/URIC ACID: CPT

## 2021-07-15 PROCEDURE — 83615 LACTATE (LD) (LDH) ENZYME: CPT

## 2021-07-15 PROCEDURE — 85384 FIBRINOGEN ACTIVITY: CPT

## 2021-07-15 PROCEDURE — 85025 COMPLETE CBC W/AUTO DIFF WBC: CPT

## 2021-07-15 PROCEDURE — 99211 OFF/OP EST MAY X REQ PHY/QHP: CPT

## 2021-07-15 PROCEDURE — 80053 COMPREHEN METABOLIC PANEL: CPT

## 2021-07-15 NOTE — FLOWSHEET NOTE
TR to  and notified of Adelso, presents to unit for [facility] labs and 24 hour urine return, lab results, bld pressures, reactive trace obtained, orders received to discharge to home and follow up in office as scheduled.

## 2021-07-15 NOTE — FLOWSHEET NOTE
Pt presents amb to unit for 24 hour urine return. Lab slip received from office. POC discussed with pt and lab obtained and sent. Pitcher of water given. abd soft and non tender. FM audible.

## 2021-07-28 ENCOUNTER — HOSPITAL ENCOUNTER (OUTPATIENT)
Age: 26
LOS: 1 days | Discharge: HOME | End: 2021-07-28
Attending: OBSTETRICS & GYNECOLOGY | Admitting: OBSTETRICS & GYNECOLOGY
Payer: COMMERCIAL

## 2021-07-28 VITALS — SYSTOLIC BLOOD PRESSURE: 110 MMHG | DIASTOLIC BLOOD PRESSURE: 63 MMHG | HEART RATE: 89 BPM

## 2021-07-28 LAB
ALBUMIN SERPL-MCNC: 3.9 GM/DL (ref 3.4–5)
ALP BLD-CCNC: 178 IU/L (ref 40–129)
ALT SERPL-CCNC: 12 U/L (ref 10–40)
ANION GAP SERPL CALCULATED.3IONS-SCNC: 13 MMOL/L (ref 4–16)
AST SERPL-CCNC: 13 IU/L (ref 15–37)
BACTERIA: NEGATIVE /HPF
BASOPHILS ABSOLUTE: 0 K/CU MM
BASOPHILS RELATIVE PERCENT: 0.2 % (ref 0–1)
BILIRUB SERPL-MCNC: 0.2 MG/DL (ref 0–1)
BILIRUBIN URINE: NEGATIVE MG/DL
BLOOD, URINE: NEGATIVE
BUN BLDV-MCNC: 6 MG/DL (ref 6–23)
CALCIUM SERPL-MCNC: 8.8 MG/DL (ref 8.3–10.6)
CHLORIDE BLD-SCNC: 101 MMOL/L (ref 99–110)
CLARITY: ABNORMAL
CO2: 21 MMOL/L (ref 21–32)
COLOR: YELLOW
CREAT SERPL-MCNC: 0.4 MG/DL (ref 0.6–1.1)
CREATININE URINE: 180.5 MG/DL (ref 28–217)
DIFFERENTIAL TYPE: ABNORMAL
EOSINOPHILS ABSOLUTE: 0.1 K/CU MM
EOSINOPHILS RELATIVE PERCENT: 0.6 % (ref 0–3)
GFR AFRICAN AMERICAN: >60 ML/MIN/1.73M2
GFR NON-AFRICAN AMERICAN: >60 ML/MIN/1.73M2
GLUCOSE BLD-MCNC: 70 MG/DL (ref 70–99)
GLUCOSE, URINE: 50 MG/DL
HCT VFR BLD CALC: 36.6 % (ref 37–47)
HEMOGLOBIN: 12.3 GM/DL (ref 12.5–16)
IMMATURE NEUTROPHIL %: 1.1 % (ref 0–0.43)
KETONES, URINE: NEGATIVE MG/DL
LEUKOCYTE ESTERASE, URINE: NEGATIVE
LYMPHOCYTES ABSOLUTE: 2.3 K/CU MM
LYMPHOCYTES RELATIVE PERCENT: 18.3 % (ref 24–44)
MCH RBC QN AUTO: 31.1 PG (ref 27–31)
MCHC RBC AUTO-ENTMCNC: 33.6 % (ref 32–36)
MCV RBC AUTO: 92.7 FL (ref 78–100)
MONOCYTES ABSOLUTE: 1 K/CU MM
MONOCYTES RELATIVE PERCENT: 8 % (ref 0–4)
MUCUS: ABNORMAL HPF
NITRITE URINE, QUANTITATIVE: NEGATIVE
NUCLEATED RBC %: 0 %
PDW BLD-RTO: 13.4 % (ref 11.7–14.9)
PH, URINE: 6 (ref 5–8)
PLATELET # BLD: 208 K/CU MM (ref 140–440)
PMV BLD AUTO: 11.5 FL (ref 7.5–11.1)
POTASSIUM SERPL-SCNC: 4.1 MMOL/L (ref 3.5–5.1)
PROT/CREAT RATIO, UR: 0.1
PROTEIN UA: NEGATIVE MG/DL
RBC # BLD: 3.95 M/CU MM (ref 4.2–5.4)
RBC URINE: 1 /HPF (ref 0–6)
SEGMENTED NEUTROPHILS ABSOLUTE COUNT: 9 K/CU MM
SEGMENTED NEUTROPHILS RELATIVE PERCENT: 71.8 % (ref 36–66)
SODIUM BLD-SCNC: 135 MMOL/L (ref 135–145)
SPECIFIC GRAVITY UA: 1.02 (ref 1–1.03)
SQUAMOUS EPITHELIAL: 3 /HPF
TOTAL IMMATURE NEUTOROPHIL: 0.14 K/CU MM
TOTAL NUCLEATED RBC: 0 K/CU MM
TOTAL PROTEIN: 5.6 GM/DL (ref 6.4–8.2)
TRICHOMONAS: ABNORMAL /HPF
URIC ACID: 3.3 MG/DL (ref 2.6–6)
URINE TOTAL PROTEIN: 23.5 MG/DL
UROBILINOGEN, URINE: NEGATIVE MG/DL (ref 0.2–1)
WBC # BLD: 12.5 K/CU MM (ref 4–10.5)
WBC UA: 1 /HPF (ref 0–5)

## 2021-07-28 PROCEDURE — 84550 ASSAY OF BLOOD/URIC ACID: CPT

## 2021-07-28 PROCEDURE — 81001 URINALYSIS AUTO W/SCOPE: CPT

## 2021-07-28 PROCEDURE — 81050 URINALYSIS VOLUME MEASURE: CPT

## 2021-07-28 PROCEDURE — 84156 ASSAY OF PROTEIN URINE: CPT

## 2021-07-28 PROCEDURE — 80053 COMPREHEN METABOLIC PANEL: CPT

## 2021-07-28 PROCEDURE — 82570 ASSAY OF URINE CREATININE: CPT

## 2021-07-28 PROCEDURE — 85025 COMPLETE CBC W/AUTO DIFF WBC: CPT

## 2021-07-28 NOTE — FLOWSHEET NOTE
Pt presents amb to unit for [facility] lab work. orders received prior to arrival from office. POC discussed with pt. Pt denies further questions or concerns.

## 2021-07-28 NOTE — FLOWSHEET NOTE
EFM off discharge instructions given  And explained instructed to follow up tomorrow on unit and 24 hour urine collection. Pt verbalized understanding and left amb from unit for home with sign. Other without distress.

## 2021-07-29 ENCOUNTER — HOSPITAL ENCOUNTER (OUTPATIENT)
Age: 26
Discharge: HOME | End: 2021-07-29
Attending: OBSTETRICS & GYNECOLOGY | Admitting: OBSTETRICS & GYNECOLOGY
Payer: COMMERCIAL

## 2021-07-29 VITALS
HEART RATE: 86 BPM | RESPIRATION RATE: 18 BRPM | OXYGEN SATURATION: 98 % | DIASTOLIC BLOOD PRESSURE: 60 MMHG | TEMPERATURE: 98.5 F | SYSTOLIC BLOOD PRESSURE: 105 MMHG

## 2021-07-29 PROBLEM — Z36.89 ENCOUNTER FOR TRIAGE IN PREGNANT PATIENT: Status: ACTIVE | Noted: 2021-07-29

## 2021-07-29 LAB
Lab: 24 HRS
PROTEIN 24 HOUR URINE: 160 MG/24 HR
VOLUME, (UVOL): 1000 MLS

## 2021-07-29 PROCEDURE — 99211 OFF/OP EST MAY X REQ PHY/QHP: CPT

## 2021-07-29 PROCEDURE — 84156 ASSAY OF PROTEIN URINE: CPT

## 2021-07-30 ENCOUNTER — HOSPITAL ENCOUNTER (OUTPATIENT)
Age: 26
Discharge: HOME | End: 2021-07-30
Attending: OBSTETRICS & GYNECOLOGY | Admitting: OBSTETRICS & GYNECOLOGY
Payer: COMMERCIAL

## 2021-07-30 VITALS
DIASTOLIC BLOOD PRESSURE: 51 MMHG | RESPIRATION RATE: 14 BRPM | HEART RATE: 78 BPM | HEIGHT: 63 IN | OXYGEN SATURATION: 99 % | SYSTOLIC BLOOD PRESSURE: 92 MMHG | WEIGHT: 145 LBS | BODY MASS INDEX: 25.69 KG/M2 | TEMPERATURE: 98 F

## 2021-07-30 LAB
AMPHETAMINES: NEGATIVE
BACTERIA: ABNORMAL /HPF
BARBITURATE SCREEN URINE: NEGATIVE
BENZODIAZEPINE SCREEN, URINE: NEGATIVE
BILIRUBIN URINE: NEGATIVE MG/DL
BLOOD, URINE: NEGATIVE
CANNABINOID SCREEN URINE: NEGATIVE
CLARITY: ABNORMAL
COCAINE METABOLITE: NEGATIVE
COLOR: ABNORMAL
GLUCOSE BLD-MCNC: 125 MG/DL (ref 70–99)
GLUCOSE BLD-MCNC: 28 MG/DL (ref 70–99)
GLUCOSE BLD-MCNC: 67 MG/DL (ref 70–99)
GLUCOSE BLD-MCNC: 71 MG/DL (ref 70–99)
GLUCOSE BLD-MCNC: 74 MG/DL (ref 70–99)
GLUCOSE, URINE: 50 MG/DL
KETONES, URINE: NEGATIVE MG/DL
LEUKOCYTE ESTERASE, URINE: ABNORMAL
NITRITE URINE, QUANTITATIVE: NEGATIVE
OPIATES, URINE: NEGATIVE
OXYCODONE: NEGATIVE
PH, URINE: 6 (ref 5–8)
PHENCYCLIDINE, URINE: NEGATIVE
PROTEIN UA: 30 MG/DL
RBC URINE: 6 /HPF (ref 0–6)
SPECIFIC GRAVITY UA: 1.02 (ref 1–1.03)
SQUAMOUS EPITHELIAL: 6 /HPF
TRICHOMONAS: ABNORMAL /HPF
UROBILINOGEN, URINE: NEGATIVE MG/DL (ref 0.2–1)
WBC UA: 7 /HPF (ref 0–5)

## 2021-07-30 PROCEDURE — 2580000003 HC RX 258: Performed by: OBSTETRICS & GYNECOLOGY

## 2021-07-30 PROCEDURE — 96360 HYDRATION IV INFUSION INIT: CPT

## 2021-07-30 PROCEDURE — 99211 OFF/OP EST MAY X REQ PHY/QHP: CPT

## 2021-07-30 PROCEDURE — 80307 DRUG TEST PRSMV CHEM ANLYZR: CPT

## 2021-07-30 PROCEDURE — 96366 THER/PROPH/DIAG IV INF ADDON: CPT

## 2021-07-30 PROCEDURE — 81001 URINALYSIS AUTO W/SCOPE: CPT

## 2021-07-30 PROCEDURE — 82962 GLUCOSE BLOOD TEST: CPT

## 2021-07-30 RX ORDER — SODIUM CHLORIDE, SODIUM LACTATE, POTASSIUM CHLORIDE, CALCIUM CHLORIDE 600; 310; 30; 20 MG/100ML; MG/100ML; MG/100ML; MG/100ML
INJECTION, SOLUTION INTRAVENOUS CONTINUOUS
Status: DISCONTINUED | OUTPATIENT
Start: 2021-07-30 | End: 2021-07-30 | Stop reason: HOSPADM

## 2021-07-30 RX ADMIN — SODIUM CHLORIDE, POTASSIUM CHLORIDE, SODIUM LACTATE AND CALCIUM CHLORIDE: 600; 310; 30; 20 INJECTION, SOLUTION INTRAVENOUS at 18:10

## 2021-07-30 RX ADMIN — SODIUM CHLORIDE, POTASSIUM CHLORIDE, SODIUM LACTATE AND CALCIUM CHLORIDE: 600; 310; 30; 20 INJECTION, SOLUTION INTRAVENOUS at 17:00

## 2021-07-30 NOTE — FLOWSHEET NOTE
Pt called out and states not feeling well, states feeling light headed questioned pt what she had eaten today pt states  had [employer]  Cereal today around 100 did not eat breakfast denies any H/O diabetes states urine at Drs Office does always have sugar in it, POC discussed. Pulse Ox on 97% advised would be checking blood sugar and giving juice to drink.

## 2021-07-30 NOTE — FLOWSHEET NOTE
Lab called and inquired about 24 hour urine results still pending, said that it was currently running.

## 2021-07-30 NOTE — FLOWSHEET NOTE
EFM And TOCO On per ALEM Kang RN pt stats baby has been active and moving denies any vaginal leaking or bleeding, denies any tender spots to touch on abdomen, pt states just has a lot of pressure and worsens when stands up and walks POC discussed.

## 2021-07-30 NOTE — FLOWSHEET NOTE
Presents to L/D ambulatory with c/o lower abdominal pressure and worsens when standing, shown to LT 04 instructed on obtaining urine for CCMS change into gown collect urine call when ready, pts mother at side.

## 2021-07-30 NOTE — FLOWSHEET NOTE
on unit advised of pt status advised pt just received dinner tray will check blood sugar after pt eats if normal pt may be discharged and follow up as scheduled or sooner.

## 2021-07-30 NOTE — FLOWSHEET NOTE
Tele  advised of pt uterine irritability and recent variable,orders received to IV hydrate and pt may have dinner tray.

## 2021-07-30 NOTE — FLOWSHEET NOTE
Discharge paperwork given and explained to patient, she verbalized understanding, patient ambulated out of [facility] upon discharge, no distress noted.

## 2021-07-30 NOTE — FLOWSHEET NOTE
Tele dr. Loren Moraes advised of pt status and c/o and recent POC BS, interventions and current BS of 67, orders received to check BS times two more tines thirty minutes apart and pt may be discharged if BS WNL in one hour, Will keep informed.

## 2021-07-31 NOTE — FLOWSHEET NOTE
EFM and TOCO off discharge instructions given to follow up as scheduled or sooner as needed, instructed on healthy eating  Importance of eating protein with carbohydrates,, fetal kick counts reasons to return to L/D pt voices understanding, preparing for discharge and papers signed.

## 2021-08-04 ENCOUNTER — HOSPITAL ENCOUNTER (OUTPATIENT)
Dept: LAB | Age: 26
Discharge: HOME | End: 2021-08-04
Payer: COMMERCIAL

## 2021-08-04 PROCEDURE — U0003 INFECTIOUS AGENT DETECTION BY NUCLEIC ACID (DNA OR RNA); SEVERE ACUTE RESPIRATORY SYNDROME CORONAVIRUS 2 (SARS-COV-2) (CORONAVIRUS DISEASE [COVID-19]), AMPLIFIED PROBE TECHNIQUE, MAKING USE OF HIGH THROUGHPUT TECHNOLOGIES AS DESCRIBED BY CMS-2020-01-R: HCPCS

## 2021-08-04 PROCEDURE — U0005 INFEC AGEN DETEC AMPLI PROBE: HCPCS

## 2021-08-05 ENCOUNTER — HOSPITAL ENCOUNTER (OUTPATIENT)
Age: 26
Discharge: HOME | End: 2021-08-05
Attending: OBSTETRICS & GYNECOLOGY | Admitting: OBSTETRICS & GYNECOLOGY
Payer: COMMERCIAL

## 2021-08-05 VITALS
SYSTOLIC BLOOD PRESSURE: 112 MMHG | DIASTOLIC BLOOD PRESSURE: 61 MMHG | HEART RATE: 68 BPM | TEMPERATURE: 96.8 F | RESPIRATION RATE: 16 BRPM

## 2021-08-05 LAB
BACTERIA: NEGATIVE /HPF
BILIRUBIN URINE: NEGATIVE MG/DL
BLOOD, URINE: NEGATIVE
CLARITY: ABNORMAL
COLOR: YELLOW
GLUCOSE, URINE: NEGATIVE MG/DL
KETONES, URINE: NEGATIVE MG/DL
LEUKOCYTE ESTERASE, URINE: NEGATIVE
MUCUS: ABNORMAL HPF
NITRITE URINE, QUANTITATIVE: NEGATIVE
PH, URINE: 6 (ref 5–8)
PROTEIN UA: NEGATIVE MG/DL
RBC URINE: 3 /HPF (ref 0–6)
SARS-COV-2: NOT DETECTED
SOURCE: NORMAL
SPECIFIC GRAVITY UA: 1.02 (ref 1–1.03)
SQUAMOUS EPITHELIAL: 5 /HPF
TRICHOMONAS: ABNORMAL /HPF
UNCLASSIFIED CRYSTAL: ABNORMAL /HPF
UROBILINOGEN, URINE: NEGATIVE MG/DL (ref 0.2–1)
WBC CLUMP: ABNORMAL /HPF
WBC UA: 3 /HPF (ref 0–5)
YEAST: ABNORMAL /HPF

## 2021-08-05 PROCEDURE — 99211 OFF/OP EST MAY X REQ PHY/QHP: CPT

## 2021-08-05 PROCEDURE — 81001 URINALYSIS AUTO W/SCOPE: CPT

## 2021-08-05 NOTE — FLOWSHEET NOTE
Pt presents amb to unit for c/o pain in lower abd that shots down her thigh when she stands up and walks. denies vaginal bleeding. abd soft and non tender. POC discussed. Pitcher of water given.

## 2021-08-05 NOTE — FLOWSHEET NOTE
EFM off discharge instructions given and explained. Instructed to follow up in office as scheduled. Pt verbalized understandings and left amb from unit for home without distress.

## 2021-08-05 NOTE — FLOWSHEET NOTE
Tr to  and notified of DANYA, c/o, UA results, irritability, FHR reactive, VSS. Orders received to do VE and if not further dilated then may discharge to home and follow up as scheduled.

## 2021-08-12 ENCOUNTER — HOSPITAL ENCOUNTER (INPATIENT)
Age: 26
LOS: 2 days | Discharge: HOME | End: 2021-08-14
Attending: OBSTETRICS & GYNECOLOGY | Admitting: OBSTETRICS & GYNECOLOGY
Payer: COMMERCIAL

## 2021-08-12 ENCOUNTER — ANESTHESIA EVENT (OUTPATIENT)
Dept: LABOR AND DELIVERY | Age: 26
End: 2021-08-12
Payer: COMMERCIAL

## 2021-08-12 ENCOUNTER — ANESTHESIA (OUTPATIENT)
Dept: LABOR AND DELIVERY | Age: 26
End: 2021-08-12
Payer: COMMERCIAL

## 2021-08-12 DIAGNOSIS — G89.18 POST-OP PAIN: Primary | ICD-10-CM

## 2021-08-12 PROBLEM — Z78.9 ADMITTED TO LABOR AND DELIVERY: Status: RESOLVED | Noted: 2021-07-15 | Resolved: 2021-08-12

## 2021-08-12 PROBLEM — Z36.89 ENCOUNTER FOR TRIAGE IN PREGNANT PATIENT: Status: RESOLVED | Noted: 2021-07-29 | Resolved: 2021-08-12

## 2021-08-12 PROBLEM — Z33.1 PREGNANCY AS INCIDENTAL FINDING: Status: RESOLVED | Noted: 2021-06-24 | Resolved: 2021-08-12

## 2021-08-12 PROBLEM — Z34.83 NORMAL PREGNANCY IN MULTIGRAVIDA IN THIRD TRIMESTER: Status: ACTIVE | Noted: 2021-08-12

## 2021-08-12 PROBLEM — O26.93 PREGNANCY RELATED CONDITION IN THIRD TRIMESTER: Status: RESOLVED | Noted: 2021-06-18 | Resolved: 2021-08-12

## 2021-08-12 PROBLEM — O26.92 PREGNANCY RELATED CONDITION IN SECOND TRIMESTER: Status: RESOLVED | Noted: 2021-08-12 | Resolved: 2021-08-12

## 2021-08-12 PROBLEM — O26.92 PREGNANCY RELATED CONDITION IN SECOND TRIMESTER: Status: ACTIVE | Noted: 2021-08-12

## 2021-08-12 LAB
ABO/RH: NORMAL
AMPHETAMINES: NEGATIVE
ANTIBODY SCREEN: NEGATIVE
BACTERIA: NEGATIVE /HPF
BARBITURATE SCREEN URINE: NEGATIVE
BASOPHILS ABSOLUTE: 0 K/CU MM
BASOPHILS RELATIVE PERCENT: 0.4 % (ref 0–1)
BENZODIAZEPINE SCREEN, URINE: NEGATIVE
BILIRUBIN URINE: NEGATIVE MG/DL
BLOOD, URINE: NEGATIVE
CANNABINOID SCREEN URINE: NEGATIVE
CLARITY: CLEAR
COCAINE METABOLITE: NEGATIVE
COLOR: YELLOW
DIFFERENTIAL TYPE: ABNORMAL
EOSINOPHILS ABSOLUTE: 0.1 K/CU MM
EOSINOPHILS RELATIVE PERCENT: 0.8 % (ref 0–3)
GLUCOSE, URINE: NEGATIVE MG/DL
HCT VFR BLD CALC: 37.4 % (ref 37–47)
HEMOGLOBIN: 11.9 GM/DL (ref 12.5–16)
IMMATURE NEUTROPHIL %: 2.6 % (ref 0–0.43)
KETONES, URINE: NEGATIVE MG/DL
LEUKOCYTE ESTERASE, URINE: NEGATIVE
LYMPHOCYTES ABSOLUTE: 2.3 K/CU MM
LYMPHOCYTES RELATIVE PERCENT: 20.3 % (ref 24–44)
MCH RBC QN AUTO: 29.9 PG (ref 27–31)
MCHC RBC AUTO-ENTMCNC: 31.8 % (ref 32–36)
MCV RBC AUTO: 94 FL (ref 78–100)
MONOCYTES ABSOLUTE: 0.8 K/CU MM
MONOCYTES RELATIVE PERCENT: 7.5 % (ref 0–4)
MUCUS: ABNORMAL HPF
NITRITE URINE, QUANTITATIVE: NEGATIVE
NUCLEATED RBC %: 0 %
OPIATES, URINE: NEGATIVE
OXYCODONE: NEGATIVE
PDW BLD-RTO: 13.5 % (ref 11.7–14.9)
PH, URINE: 6 (ref 5–8)
PHENCYCLIDINE, URINE: NEGATIVE
PLATELET # BLD: 231 K/CU MM (ref 140–440)
PMV BLD AUTO: 11.8 FL (ref 7.5–11.1)
PROTEIN UA: NEGATIVE MG/DL
RBC # BLD: 3.98 M/CU MM (ref 4.2–5.4)
RBC URINE: 1 /HPF (ref 0–6)
SEGMENTED NEUTROPHILS ABSOLUTE COUNT: 7.7 K/CU MM
SEGMENTED NEUTROPHILS RELATIVE PERCENT: 68.4 % (ref 36–66)
SPECIFIC GRAVITY UA: 1.02 (ref 1–1.03)
SQUAMOUS EPITHELIAL: 1 /HPF
TOTAL IMMATURE NEUTOROPHIL: 0.29 K/CU MM
TOTAL NUCLEATED RBC: 0 K/CU MM
TRICHOMONAS: ABNORMAL /HPF
UROBILINOGEN, URINE: NEGATIVE MG/DL (ref 0.2–1)
WBC # BLD: 11.3 K/CU MM (ref 4–10.5)
WBC UA: 1 /HPF (ref 0–5)

## 2021-08-12 PROCEDURE — 85025 COMPLETE CBC W/AUTO DIFF WBC: CPT

## 2021-08-12 PROCEDURE — 7200000001 HC VAGINAL DELIVERY

## 2021-08-12 PROCEDURE — 6360000002 HC RX W HCPCS: Performed by: NURSE ANESTHETIST, CERTIFIED REGISTERED

## 2021-08-12 PROCEDURE — 2500000003 HC RX 250 WO HCPCS: Performed by: NURSE ANESTHETIST, CERTIFIED REGISTERED

## 2021-08-12 PROCEDURE — 80307 DRUG TEST PRSMV CHEM ANLYZR: CPT

## 2021-08-12 PROCEDURE — 1220000000 HC SEMI PRIVATE OB R&B

## 2021-08-12 PROCEDURE — 3700000025 EPIDURAL BLOCK: Performed by: NURSE ANESTHETIST, CERTIFIED REGISTERED

## 2021-08-12 PROCEDURE — 86901 BLOOD TYPING SEROLOGIC RH(D): CPT

## 2021-08-12 PROCEDURE — 86850 RBC ANTIBODY SCREEN: CPT

## 2021-08-12 PROCEDURE — 6370000000 HC RX 637 (ALT 250 FOR IP): Performed by: OBSTETRICS & GYNECOLOGY

## 2021-08-12 PROCEDURE — 3E033VJ INTRODUCTION OF OTHER HORMONE INTO PERIPHERAL VEIN, PERCUTANEOUS APPROACH: ICD-10-PCS | Performed by: OBSTETRICS & GYNECOLOGY

## 2021-08-12 PROCEDURE — 6360000002 HC RX W HCPCS: Performed by: OBSTETRICS & GYNECOLOGY

## 2021-08-12 PROCEDURE — 86900 BLOOD TYPING SEROLOGIC ABO: CPT

## 2021-08-12 PROCEDURE — 81001 URINALYSIS AUTO W/SCOPE: CPT

## 2021-08-12 PROCEDURE — 10907ZC DRAINAGE OF AMNIOTIC FLUID, THERAPEUTIC FROM PRODUCTS OF CONCEPTION, VIA NATURAL OR ARTIFICIAL OPENING: ICD-10-PCS | Performed by: OBSTETRICS & GYNECOLOGY

## 2021-08-12 RX ORDER — IBUPROFEN 800 MG/1
800 TABLET ORAL EVERY 8 HOURS
Status: DISCONTINUED | OUTPATIENT
Start: 2021-08-12 | End: 2021-08-14 | Stop reason: HOSPADM

## 2021-08-12 RX ORDER — DOCUSATE SODIUM 100 MG/1
100 CAPSULE, LIQUID FILLED ORAL 2 TIMES DAILY
Status: DISCONTINUED | OUTPATIENT
Start: 2021-08-12 | End: 2021-08-14 | Stop reason: HOSPADM

## 2021-08-12 RX ORDER — MISOPROSTOL 200 UG/1
800 TABLET ORAL PRN
Status: DISCONTINUED | OUTPATIENT
Start: 2021-08-12 | End: 2021-08-12

## 2021-08-12 RX ORDER — CARBOPROST TROMETHAMINE 250 UG/ML
250 INJECTION, SOLUTION INTRAMUSCULAR PRN
Status: DISCONTINUED | OUTPATIENT
Start: 2021-08-12 | End: 2021-08-12

## 2021-08-12 RX ORDER — SODIUM CHLORIDE 0.9 % (FLUSH) 0.9 %
10 SYRINGE (ML) INJECTION PRN
Status: DISCONTINUED | OUTPATIENT
Start: 2021-08-12 | End: 2021-08-14 | Stop reason: HOSPADM

## 2021-08-12 RX ORDER — ROPIVACAINE HYDROCHLORIDE 2 MG/ML
INJECTION, SOLUTION EPIDURAL; INFILTRATION; PERINEURAL PRN
Status: DISCONTINUED | OUTPATIENT
Start: 2021-08-12 | End: 2021-08-12 | Stop reason: SDUPTHER

## 2021-08-12 RX ORDER — ROPIVACAINE HYDROCHLORIDE 2 MG/ML
10 INJECTION, SOLUTION EPIDURAL; INFILTRATION; PERINEURAL CONTINUOUS
Status: DISCONTINUED | OUTPATIENT
Start: 2021-08-12 | End: 2021-08-12

## 2021-08-12 RX ORDER — SODIUM CHLORIDE, SODIUM LACTATE, POTASSIUM CHLORIDE, CALCIUM CHLORIDE 600; 310; 30; 20 MG/100ML; MG/100ML; MG/100ML; MG/100ML
INJECTION, SOLUTION INTRAVENOUS CONTINUOUS
Status: DISCONTINUED | OUTPATIENT
Start: 2021-08-12 | End: 2021-08-12

## 2021-08-12 RX ORDER — HYDROCODONE BITARTRATE AND ACETAMINOPHEN 5; 325 MG/1; MG/1
1 TABLET ORAL EVERY 4 HOURS PRN
Status: DISCONTINUED | OUTPATIENT
Start: 2021-08-12 | End: 2021-08-14 | Stop reason: HOSPADM

## 2021-08-12 RX ORDER — HYDROCODONE BITARTRATE AND ACETAMINOPHEN 5; 325 MG/1; MG/1
2 TABLET ORAL EVERY 4 HOURS PRN
Status: DISCONTINUED | OUTPATIENT
Start: 2021-08-12 | End: 2021-08-14 | Stop reason: HOSPADM

## 2021-08-12 RX ORDER — ONDANSETRON 2 MG/ML
4 INJECTION INTRAMUSCULAR; INTRAVENOUS EVERY 6 HOURS PRN
Status: DISCONTINUED | OUTPATIENT
Start: 2021-08-12 | End: 2021-08-14 | Stop reason: HOSPADM

## 2021-08-12 RX ORDER — ONDANSETRON 4 MG/1
4 TABLET, ORALLY DISINTEGRATING ORAL EVERY 8 HOURS PRN
Status: DISCONTINUED | OUTPATIENT
Start: 2021-08-12 | End: 2021-08-14 | Stop reason: HOSPADM

## 2021-08-12 RX ORDER — ONDANSETRON 2 MG/ML
4 INJECTION INTRAMUSCULAR; INTRAVENOUS EVERY 6 HOURS PRN
Status: DISCONTINUED | OUTPATIENT
Start: 2021-08-12 | End: 2021-08-12

## 2021-08-12 RX ORDER — METHYLERGONOVINE MALEATE 0.2 MG/ML
200 INJECTION INTRAVENOUS PRN
Status: DISCONTINUED | OUTPATIENT
Start: 2021-08-12 | End: 2021-08-12

## 2021-08-12 RX ORDER — LANOLIN 100 %
OINTMENT (GRAM) TOPICAL PRN
Status: DISCONTINUED | OUTPATIENT
Start: 2021-08-12 | End: 2021-08-14 | Stop reason: HOSPADM

## 2021-08-12 RX ORDER — SODIUM CHLORIDE 9 MG/ML
25 INJECTION, SOLUTION INTRAVENOUS PRN
Status: DISCONTINUED | OUTPATIENT
Start: 2021-08-12 | End: 2021-08-14 | Stop reason: HOSPADM

## 2021-08-12 RX ORDER — SODIUM CHLORIDE 0.9 % (FLUSH) 0.9 %
5-40 SYRINGE (ML) INJECTION PRN
Status: DISCONTINUED | OUTPATIENT
Start: 2021-08-12 | End: 2021-08-12

## 2021-08-12 RX ORDER — ACETAMINOPHEN 325 MG/1
650 TABLET ORAL EVERY 4 HOURS PRN
Status: DISCONTINUED | OUTPATIENT
Start: 2021-08-12 | End: 2021-08-12

## 2021-08-12 RX ORDER — SODIUM CHLORIDE, SODIUM LACTATE, POTASSIUM CHLORIDE, AND CALCIUM CHLORIDE .6; .31; .03; .02 G/100ML; G/100ML; G/100ML; G/100ML
500 INJECTION, SOLUTION INTRAVENOUS PRN
Status: DISCONTINUED | OUTPATIENT
Start: 2021-08-12 | End: 2021-08-12

## 2021-08-12 RX ORDER — SODIUM CHLORIDE 0.9 % (FLUSH) 0.9 %
5-40 SYRINGE (ML) INJECTION EVERY 12 HOURS SCHEDULED
Status: DISCONTINUED | OUTPATIENT
Start: 2021-08-12 | End: 2021-08-12

## 2021-08-12 RX ORDER — ACETAMINOPHEN 325 MG/1
650 TABLET ORAL EVERY 4 HOURS PRN
Status: DISCONTINUED | OUTPATIENT
Start: 2021-08-12 | End: 2021-08-14 | Stop reason: HOSPADM

## 2021-08-12 RX ORDER — SODIUM CHLORIDE, SODIUM LACTATE, POTASSIUM CHLORIDE, AND CALCIUM CHLORIDE .6; .31; .03; .02 G/100ML; G/100ML; G/100ML; G/100ML
1000 INJECTION, SOLUTION INTRAVENOUS PRN
Status: DISCONTINUED | OUTPATIENT
Start: 2021-08-12 | End: 2021-08-12

## 2021-08-12 RX ORDER — FAMOTIDINE 20 MG/1
20 TABLET, FILM COATED ORAL 2 TIMES DAILY PRN
Status: DISCONTINUED | OUTPATIENT
Start: 2021-08-12 | End: 2021-08-14 | Stop reason: HOSPADM

## 2021-08-12 RX ORDER — FENTANYL CITRATE 50 UG/ML
100 INJECTION, SOLUTION INTRAMUSCULAR; INTRAVENOUS
Status: DISCONTINUED | OUTPATIENT
Start: 2021-08-12 | End: 2021-08-12

## 2021-08-12 RX ORDER — EPHEDRINE SULFATE 50 MG/ML
INJECTION INTRAVENOUS PRN
Status: DISCONTINUED | OUTPATIENT
Start: 2021-08-12 | End: 2021-08-12 | Stop reason: SDUPTHER

## 2021-08-12 RX ORDER — SODIUM CHLORIDE 0.9 % (FLUSH) 0.9 %
10 SYRINGE (ML) INJECTION EVERY 12 HOURS SCHEDULED
Status: DISCONTINUED | OUTPATIENT
Start: 2021-08-12 | End: 2021-08-14 | Stop reason: HOSPADM

## 2021-08-12 RX ORDER — SODIUM CHLORIDE 9 MG/ML
25 INJECTION, SOLUTION INTRAVENOUS PRN
Status: DISCONTINUED | OUTPATIENT
Start: 2021-08-12 | End: 2021-08-12

## 2021-08-12 RX ADMIN — Medication 1 MILLI-UNITS/MIN: at 09:54

## 2021-08-12 RX ADMIN — EPHEDRINE SULFATE 10 MG: 50 INJECTION INTRAVENOUS at 16:09

## 2021-08-12 RX ADMIN — FENTANYL CITRATE 100 MCG: 50 INJECTION, SOLUTION INTRAMUSCULAR; INTRAVENOUS at 13:52

## 2021-08-12 RX ADMIN — Medication 166.7 ML: at 18:42

## 2021-08-12 RX ADMIN — IBUPROFEN 800 MG: 800 TABLET, FILM COATED ORAL at 20:02

## 2021-08-12 RX ADMIN — EPHEDRINE SULFATE 25 MG: 50 INJECTION INTRAVENOUS at 16:07

## 2021-08-12 RX ADMIN — DOCUSATE SODIUM 100 MG: 100 CAPSULE, LIQUID FILLED ORAL at 20:02

## 2021-08-12 RX ADMIN — ROPIVACAINE HYDROCHLORIDE 5 MG: 2 INJECTION, SOLUTION EPIDURAL; INFILTRATION at 14:38

## 2021-08-12 RX ADMIN — EPHEDRINE SULFATE 5 MG: 50 INJECTION INTRAVENOUS at 16:08

## 2021-08-12 ASSESSMENT — PAIN DESCRIPTION - DESCRIPTORS: DESCRIPTORS: CRAMPING

## 2021-08-12 ASSESSMENT — PAIN DESCRIPTION - FREQUENCY: FREQUENCY: INTERMITTENT

## 2021-08-12 ASSESSMENT — PAIN DESCRIPTION - PROGRESSION: CLINICAL_PROGRESSION: GRADUALLY IMPROVING

## 2021-08-12 ASSESSMENT — PAIN SCALES - GENERAL
PAINLEVEL_OUTOF10: 2
PAINLEVEL_OUTOF10: 0
PAINLEVEL_OUTOF10: 0
PAINLEVEL_OUTOF10: 6
PAINLEVEL_OUTOF10: 8

## 2021-08-12 ASSESSMENT — PAIN DESCRIPTION - ORIENTATION: ORIENTATION: LOWER

## 2021-08-12 ASSESSMENT — PAIN - FUNCTIONAL ASSESSMENT: PAIN_FUNCTIONAL_ASSESSMENT: ACTIVITIES ARE NOT PREVENTED

## 2021-08-12 ASSESSMENT — PAIN DESCRIPTION - PAIN TYPE: TYPE: ACUTE PAIN

## 2021-08-12 ASSESSMENT — PAIN DESCRIPTION - LOCATION: LOCATION: ABDOMEN

## 2021-08-12 ASSESSMENT — PAIN DESCRIPTION - ONSET: ONSET: ON-GOING

## 2021-08-12 NOTE — H&P
Food Insecurity: No Food Insecurity    Worried About Running Out of Food in the Last Year: Never true    [place] of Food in the Last Year: Never true   Transportation Needs: No Transportation Needs    Lack of Transportation (Medical): No    Lack of Transportation (Non-Medical): No   Physical Activity:     Days of Exercise per Week:     Minutes of Exercise per Session:    Stress:     Feeling of Stress :    Social Connections:     Frequency of Communication with Friends and Family:     Frequency of Social Gatherings with Friends and Family:     Attends Muslim Services:     Active Member of Clubs or Organizations:     Attends Club or Organization Meetings:     Marital Status:    Intimate Partner Violence:     Fear of Current or Ex-Partner:     Emotionally Abused:     Physically Abused:     Sexually Abused:      Family History:       Problem Relation Age of Onset    Heart Disease Mother         pacemaker   [facility] Migraines Mother     Coronary Art Dis Other         GRANDFATHER AND GREAT GRANDFATHER (NOT SPECIFIED)    Diabetes Other         GRANDMOTHER - NON INSULIN DEPENDNET     Medications Prior to Admission:  Medications Prior to Admission: PROGESTERONE IM, Inject into the muscle  Prenatal MV-Min-Fe Fum-FA-DHA (PRENATAL 1 PO), Take 1 tablet by mouth daily  Compression Stockings MISC, by Does not apply route 15 to 20 mmHG    REVIEW OF SYSTEMS:    CONSTITUTIONAL:  negative  RESPIRATORY:  negative  CARDIOVASCULAR:  negative  GASTROINTESTINAL:  negative  ALLERGIC/IMMUNOLOGIC:  negative  NEUROLOGICAL:  negative  BEHAVIOR/PSYCH:  negative    PHYSICAL EXAM:  Blood pressure (!) 103/56, pulse 86, temperature 98.1 °F (36.7 °C), resp. rate 16, last menstrual period 07/12/2020, unknown if currently breastfeeding. General appearance:  awake, alert, cooperative, no apparent distress, and appears stated age  Neurologic:  Awake, alert, oriented to name, place and time.     Lungs:  No increased work of breathing, good air exchange  Abdomen:  Soft, non tender, gravid, consistent with her gestational age,   Fetal heart rate:    Baseline Heart Rate:  135        Accelerations:  present       Long Term Variability:  moderate       Decelerations:  absent       Pelvis:  Adequate pelvis  Cervix:2/70/-3      Contraction frequency:  Not yet myrna regularly    Membranes:  Intact    ASSESSMENT AND PLAN:    Labor: Admit, anticipate normal delivery, routine labor orders  Fetus: Reassuring  GBS:negative  Other: pitocin, AROM for augmentation of labor

## 2021-08-12 NOTE — FLOWSHEET NOTE
Giancarlo CRNA to room for epidural   1430 Consent signed  0058 Cath placed  1436 Test  1438 Patient repositioned

## 2021-08-12 NOTE — FLOWSHEET NOTE
Dr. Benja Monson called and notified of patients arrival and SVE. Dr. Benja Monson is waiting for surgical case to start and unable to place orders until it is complete. TORB to place orders.

## 2021-08-12 NOTE — ANESTHESIA PRE PROCEDURE
Department of Anesthesiology  Preprocedure Note       Name:  Robert Cee   Age:  23 y.o.  :  1995                                          MRN:  [id number]         Date:  2021      Surgeon: * No surgeons listed *    Procedure: * No procedures listed *    Medications prior to admission:   Prior to Admission medications    Medication Sig Start Date End Date Taking?  Authorizing Provider   PROGESTERONE IM Inject into the muscle   Yes Historical Provider, MD   Prenatal MV-Min-Fe Fum-FA-DHA (PRENATAL 1 PO) Take 1 tablet by mouth daily   Yes Historical Provider, MD   Compression Stockings MISC by Does not apply route 15 to 20 mmHG 21   Iglesia Serrano MD       Current medications:    Current Facility-Administered Medications   Medication Dose Route Frequency Provider Last Rate Last Admin    lactated ringers infusion   Intravenous Continuous Coral Fonda Apgar, MD        lactated ringers bolus  500 mL Intravenous PRN Coral Fonda Apgar, MD        Or    lactated ringers bolus  1,000 mL Intravenous PRN Coral Fonda Apgar, MD        sodium chloride flush 0.9 % injection 5-40 mL  5-40 mL Intravenous 2 times per day Shayne Treadwell MD        sodium chloride flush 0.9 % injection 5-40 mL  5-40 mL Intravenous PRN Shayne Treadwell MD        0.9 % sodium chloride infusion  25 mL Intravenous PRN Coral Fonda Apgar, MD        ondansetron [facility]) injection 4 mg  4 mg Intravenous Q6H PRN Coral Fonda Apgar, MD        acetaminophen (TYLENOL) tablet 650 mg  650 mg Oral Q4H PRN Coral Fonda Apgar, MD        benzocaine-menthol (DERMOPLAST) 20-0.5 % spray   Topical PRN Coral Fonda Apgar, MD        fentaNYL (SUBLIMAZE) injection 100 mcg  100 mcg Intravenous Q1H PRN Shayne Treadwell MD   100 mcg at 21 1352    oxytocin (PITOCIN) 30 units in 500 mL infusion  1-20 jenaro-units/min Intravenous Continuous Coral Fonda Apgar, MD 8 mL/hr at 21 1319 8 jenaro-units/min at 21 1319    oxytocin (PITOCIN) 10 unit bolus from the bag 10 Units Intravenous PRN Carri Lunsford MD        And    oxytocin (PITOCIN) 30 units in 500 mL infusion  87.3 jenaro-units/min Intravenous Continuous PRN Carir Lunsford MD        methylergonovine (METHERGINE) injection 200 mcg  200 mcg Intramuscular PRN Carri Lunsford MD        carboprost (HEMABATE) injection 250 mcg  250 mcg Intramuscular PRN Carri Lunsford MD        miSOPROStol (CYTOTEC) tablet 800 mcg  800 mcg Rectal PRN Carri Lunsford MD           Allergies:  No Known Allergies    Problem List:    Patient Active Problem List   Diagnosis Code    Migraine aura without headache G43. 109    Syncope and collapse R55    Orthostatic hypotension I95.1    Pregnancy related condition in third trimester O26.93    Pregnancy as incidental finding Z33.1    Labor and delivery, indication for care O75.9    Admitted to labor and delivery Z78.9    Encounter for triage in pregnant patient Z36.89    Pregnancy related condition in second trimester O26.92       Past Medical History:        Diagnosis Date    H/O cardiovascular stress test 02/26/2021    normal stress    H/O echocardiogram 02/23/2021    EF 55-60% no evidence of pericardial effusion no significant valvular abnormalities       Past Surgical History:        Procedure Laterality Date    APPENDECTOMY  08/2017    LYMPHADENECTOMY      TONSILLECTOMY         Social History:    Social History     Tobacco Use    Smoking status: Never Smoker    Smokeless tobacco: Never Used   Substance Use Topics    Alcohol use:  No                                Counseling given: Not Answered      Vital Signs (Current):   Vitals:    08/12/21 1011   BP: (!) 103/56   Pulse: 86   Resp: 16   Temp: 36.7 °C (98.1 °F)                                              BP Readings from Last 3 Encounters:   08/12/21 (!) 103/56   08/05/21 112/61   07/30/21 (!) 92/51       NPO Status:                                                                                 BMI:   Wt Readings from Last 3 Encounters:   07/30/21 145 lb (65.8 kg)   07/01/21 145 lb (65.8 kg)   04/05/21 137 lb (62.1 kg)     There is no height or weight on file to calculate BMI.    CBC:   Lab Results   Component Value Date    WBC 11.3 08/12/2021    RBC 3.98 08/12/2021    HGB 11.9 08/12/2021    HCT 37.4 08/12/2021    MCV 94.0 08/12/2021    RDW 13.5 08/12/2021     08/12/2021       CMP:   Lab Results   Component Value Date     07/28/2021    K 4.1 07/28/2021     07/28/2021    CO2 21 07/28/2021    BUN 6 07/28/2021    CREATININE 0.4 07/28/2021    GFRAA >60 07/28/2021    LABGLOM >60 07/28/2021    GLUCOSE 70 07/28/2021    PROT 5.6 07/28/2021    CALCIUM 8.8 07/28/2021    BILITOT 0.2 07/28/2021    ALKPHOS 178 07/28/2021    AST 13 07/28/2021    ALT 12 07/28/2021       POC Tests: No results for input(s): POCGLU, POCNA, POCK, POCCL, POCBUN, POCHEMO, POCHCT in the last 72 hours.     Coags: No results found for: PROTIME, INR, APTT    HCG (If Applicable):   Lab Results   Component Value Date    PREGTESTUR NEGATIVE 09/20/2020        ABGs: No results found for: PHART, PO2ART, UHL6FLO, GIG2BEJ, BEART, T6AGUVTE     Type & Screen (If Applicable):  No results found for: LABABO, LABRH    Drug/Infectious Status (If Applicable):  No results found for: HIV, HEPCAB    COVID-19 Screening (If Applicable):   Lab Results   Component Value Date    COVID19 NOT DETECTED 08/04/2021           Anesthesia Evaluation  Patient summary reviewed and Nursing notes reviewed no history of anesthetic complications:   Airway: Mallampati: II  TM distance: >3 FB   Neck ROM: full  Mouth opening: > = 3 FB Dental: normal exam         Pulmonary:Negative Pulmonary ROS and normal exam                               Cardiovascular:                Echocardiogram reviewed  Stress test reviewed             ROS comment: Hx of stress test and echo with normal findings     Neuro/Psych:   (+) headaches:,             GI/Hepatic/Renal: Neg GI/Hepatic/Renal ROS            Endo/Other: Negative Endo/Other ROS                    Abdominal:             Vascular: Other Findings:             Anesthesia Plan      epidural     ASA 2             Anesthetic plan and risks discussed with patient. Plan discussed with CRNA.                   MICAH Cuba - CRNA   8/12/2021

## 2021-08-12 NOTE — FLOWSHEET NOTE
[facility] for scheduled induction. Patient assisted to [facility] for induction.

## 2021-08-12 NOTE — FLOWSHEET NOTE
Patient reports feeling a little nauseated. BP is 88/51; Abdias Canada CRNA states he will grab liyah to treat. [address] per Abdias Canada CRNA  Patient reports feeling a bit better; patient position right tilt.

## 2021-08-12 NOTE — FLOWSHEET NOTE
Patient is requesting epidural at this time; CRNA is in a c section currently this explained to the patient. IV Fentanyl given at this time.

## 2021-08-12 NOTE — ANESTHESIA PROCEDURE NOTES
Epidural Block    Patient location during procedure: OB  Start time: 8/12/2021 2:30 PM  End time: 8/12/2021 2:50 PM  Reason for block: labor epidural  Staffing  Performed: resident/CRNA   Resident/CRNA: MICAH Joyner CRNA  Preanesthetic Checklist  Completed: patient identified, IV checked, risks and benefits discussed, surgical consent, monitors and equipment checked, pre-op evaluation, anesthesia consent given, oxygen available and patient being monitored  Epidural  Patient position: sitting  Prep: ChloraPrep  Patient monitoring: continuous pulse ox and frequent blood pressure checks  Approach: midline  Location: lumbar (1-5)  Injection technique: KY saline  Provider prep: mask and sterile gloves  Needle  Needle type: Markos   Needle gauge: 17 G  Needle length: 3.5 in  Needle insertion depth: 4 cm  Catheter type: side hole  Catheter size: 19 G  Catheter at skin depth: 9 cm  Test dose: negative  Assessment  Sensory level: T6  Hemodynamics: stable  Attempts: 1

## 2021-08-13 PROCEDURE — 1220000000 HC SEMI PRIVATE OB R&B

## 2021-08-13 PROCEDURE — 6370000000 HC RX 637 (ALT 250 FOR IP): Performed by: OBSTETRICS & GYNECOLOGY

## 2021-08-13 RX ADMIN — HYDROCODONE BITARTRATE AND ACETAMINOPHEN 2 TABLET: 5; 325 TABLET ORAL at 10:51

## 2021-08-13 RX ADMIN — HYDROCODONE BITARTRATE AND ACETAMINOPHEN 2 TABLET: 5; 325 TABLET ORAL at 16:38

## 2021-08-13 RX ADMIN — IBUPROFEN 800 MG: 800 TABLET, FILM COATED ORAL at 03:54

## 2021-08-13 RX ADMIN — HYDROCODONE BITARTRATE AND ACETAMINOPHEN 1 TABLET: 5; 325 TABLET ORAL at 03:55

## 2021-08-13 RX ADMIN — IBUPROFEN 800 MG: 800 TABLET, FILM COATED ORAL at 16:39

## 2021-08-13 RX ADMIN — DOCUSATE SODIUM 100 MG: 100 CAPSULE, LIQUID FILLED ORAL at 10:52

## 2021-08-13 RX ADMIN — DOCUSATE SODIUM 100 MG: 100 CAPSULE, LIQUID FILLED ORAL at 20:58

## 2021-08-13 ASSESSMENT — PAIN SCALES - GENERAL
PAINLEVEL_OUTOF10: 0
PAINLEVEL_OUTOF10: 7
PAINLEVEL_OUTOF10: 6
PAINLEVEL_OUTOF10: 7
PAINLEVEL_OUTOF10: 7

## 2021-08-13 ASSESSMENT — PAIN DESCRIPTION - FREQUENCY
FREQUENCY: INTERMITTENT
FREQUENCY: INTERMITTENT

## 2021-08-13 ASSESSMENT — PAIN DESCRIPTION - PAIN TYPE
TYPE: ACUTE PAIN
TYPE: ACUTE PAIN

## 2021-08-13 ASSESSMENT — PAIN DESCRIPTION - LOCATION
LOCATION: ABDOMEN
LOCATION: ABDOMEN

## 2021-08-13 ASSESSMENT — PAIN DESCRIPTION - DESCRIPTORS
DESCRIPTORS: CRAMPING
DESCRIPTORS: CRAMPING

## 2021-08-13 ASSESSMENT — PAIN DESCRIPTION - ORIENTATION
ORIENTATION: LOWER
ORIENTATION: LOWER

## 2021-08-13 ASSESSMENT — PAIN - FUNCTIONAL ASSESSMENT
PAIN_FUNCTIONAL_ASSESSMENT: ACTIVITIES ARE NOT PREVENTED

## 2021-08-13 ASSESSMENT — PAIN DESCRIPTION - ONSET
ONSET: GRADUAL
ONSET: GRADUAL

## 2021-08-13 ASSESSMENT — PAIN DESCRIPTION - PROGRESSION
CLINICAL_PROGRESSION: GRADUALLY WORSENING
CLINICAL_PROGRESSION: GRADUALLY WORSENING

## 2021-08-13 NOTE — FLOWSHEET NOTE
RN aids pt to bathroom via Steady. Pt voids with relief. Pt educated on safe ambulation, postpartum bleeding, and sara care. Pt demonstrates and verbalized understanding. RN provided fresh gown, gripper socks, sara pad, and underwear to pt. Pt voices no further needs.

## 2021-08-13 NOTE — PLAN OF CARE
Problem: VAGINAL DELIVERY - RECOVERY AND POST PARTUM  Goal: Vital signs are medically acceptable  8/13/2021 1717 by Elio Teran RN  Outcome: Ongoing  8/13/2021 0456 by Dominic Saez RN  Outcome: Ongoing  Goal: Patient will remain free of falls  8/13/2021 1717 by Elio Teran RN  Outcome: Ongoing  8/13/2021 0456 by Dominic Saez RN  Outcome: Ongoing  Goal: Fundus firm at midline  8/13/2021 1717 by Elio Teran RN  Outcome: Ongoing  8/13/2021 0456 by Dominic Saez RN  Outcome: Ongoing  Goal: Moderate rubra without clots, no purulent discharge, no foul smelling lochia  8/13/2021 1717 by Elio Teran RN  Outcome: Ongoing  8/13/2021 0456 by Dominic Saez RN  Outcome: Ongoing  Goal: Empties bladder  8/13/2021 1717 by Elio Teran RN  Outcome: Ongoing  8/13/2021 0456 by Dominic Saez RN  Outcome: Ongoing  Goal: Verbalizes understanding of normal bowel function resumption  8/13/2021 1717 by Elio Teran RN  Outcome: Ongoing  8/13/2021 0456 by Dominic Saez RN  Outcome: Ongoing  Goal: Edema will be absent or minimal  8/13/2021 1717 by Elio Teran RN  Outcome: Ongoing  8/13/2021 0456 by Dominic Saez RN  Outcome: Ongoing  Goal: Breasts are soft with nipple integrity intact  8/13/2021 1717 by Elio Teran RN  Outcome: Ongoing  8/13/2021 0456 by Dominic Saez RN  Outcome: Ongoing  Goal: Demonstrates appropriate breast feeding techniques  8/13/2021 1717 by Elio Teran RN  Outcome: Ongoing  8/13/2021 0456 by Dominic Saez RN  Outcome: Ongoing  Goal: Appropriate behavior observed  8/13/2021 1717 by Elio Teran RN  Outcome: Ongoing  8/13/2021 0456 by Dominic Saez RN  Outcome: Ongoing  Goal: Positive Mother-Baby interactions are observed  8/13/2021 1717 by Elio Teran RN  Outcome: Ongoing  8/13/2021 0456 by Dominic Saez RN  Outcome: Ongoing  Goal: Perineum intact without discharge or hematoma  8/13/2021 1717 by Elio Teran, RN  Outcome: Ongoing  8/13/2021 0456 by Lisa Patiño RN  Outcome: Ongoing  Goal: Ambulates independently  8/13/2021 1717 by Geena Bledsoe RN  Outcome: Ongoing  8/13/2021 0456 by Lisa Patiño RN  Outcome: Ongoing     Problem: PAIN  Goal: Patient's pain/discomfort is manageable  8/13/2021 1717 by Geena Bledsoe RN  Outcome: Ongoing  8/13/2021 0456 by Lisa Patiño RN  Outcome: Ongoing     Problem: KNOWLEDGE DEFICIT  Goal: Patient/S.O. demonstrates understanding of disease process, treatment plan, medications, and discharge instructions.   8/13/2021 1717 by Geena Bledsoe RN  Outcome: Ongoing  8/13/2021 0456 by Lisa Patiño RN  Outcome: Ongoing

## 2021-08-13 NOTE — PLAN OF CARE
Problem: Anxiety:  Goal: Level of anxiety will decrease  Description: Level of anxiety will decrease  8/12/2021 2114 by Eloise Wakefield RN  Outcome: Completed

## 2021-08-13 NOTE — PROGRESS NOTES
Subjective:     Postpartum Day 1:   The patient feels well. The patient denies emotional concerns. Pain is well controlled with current medications. The baby iswell. The patient is ambulating well. The patient is tolerating a normal diet. Objective:        Vitals:    08/13/21 0355   BP: 110/72   Pulse: 75   Resp: 16   Temp: 98 °F (36.7 °C)   SpO2: 100%       Lab Results   Component Value Date    WBC 11.3 (H) 08/12/2021    HGB 11.9 (L) 08/12/2021    HCT 37.4 08/12/2021    MCV 94.0 08/12/2021     08/12/2021       General:    alert, appears stated age and cooperative       Lochia:  appropriate   Uterine    firm       DVT Evaluation:  No evidence of DVT seen on physical exam.     Assessment:     Postpartum day 1 Doing well post delivery. Plan:     Continue current care.     Arvilla Skiff, MD 8/13/2021 6:51 PM

## 2021-08-13 NOTE — PLAN OF CARE
Problem: Anxiety:  Goal: Level of anxiety will decrease  Description: Level of anxiety will decrease  Outcome: Completed     Problem: Breathing Pattern - Ineffective:  Goal: Able to breathe comfortably  Description: Able to breathe comfortably  Outcome: Completed     Problem: Fluid Volume - Imbalance:  Goal: Absence of imbalanced fluid volume signs and symptoms  Description: Absence of imbalanced fluid volume signs and symptoms  Outcome: Completed  Goal: Absence of intrapartum hemorrhage signs and symptoms  Description: Absence of intrapartum hemorrhage signs and symptoms  Outcome: Completed     Problem: Infection - Intrapartum Infection:  Goal: Will show no infection signs and symptoms  Description: Will show no infection signs and symptoms  Outcome: Completed     Problem: Labor Process - Prolonged:  Goal: Labor progression, first stage, within specified pattern  Description: Labor progression, first stage, within specified pattern  Outcome: Completed  Goal: Labor progession, second stage, within specified pattern  Description: Labor progession, second stage, within specified pattern  Outcome: Completed  Goal: Uterine contractions within specified parameters  Description: Uterine contractions within specified parameters  Outcome: Completed     Problem:  Screening:  Goal: Ability to make informed decisions regarding treatment has improved  Description: Ability to make informed decisions regarding treatment has improved  Outcome: Completed     Problem: Pain - Acute:  Goal: Pain level will decrease  Description: Pain level will decrease  Outcome: Completed  Goal: Able to cope with pain  Description: Able to cope with pain  Outcome: Completed     Problem: Tissue Perfusion - Uteroplacental, Altered:  Description: [TRUNCATED] For intrapartum patients with recurrent variable decelerations of the fetal heart rate, consider transcervical amnioinfusion. For patients in labor, avoid prophylactic use of continuous maternal oxygen supplementation to prevent nonreassu . .. Goal: Absence of abnormal fetal heart rate pattern  Description: Absence of abnormal fetal heart rate pattern  Outcome: Completed     Problem: Urinary Retention:  Goal: Experiences of bladder distention will decrease  Description: Experiences of bladder distention will decrease  Outcome: Completed  Goal: Urinary elimination within specified parameters  Description: Urinary elimination within specified parameters  Outcome: Completed     Problem: Pain:  Description: Pain management should include both nonpharmacologic and pharmacologic interventions.   Goal: Pain level will decrease  Description: Pain level will decrease  Outcome: Completed  Goal: Control of acute pain  Description: Control of acute pain  Outcome: Completed  Goal: Control of chronic pain  Description: Control of chronic pain  Outcome: Completed     Problem: VAGINAL DELIVERY - RECOVERY AND POST PARTUM  Goal: Vital signs are medically acceptable  Outcome: Ongoing  Goal: Patient will remain free of falls  Outcome: Ongoing  Goal: Fundus firm at midline  Outcome: Ongoing  Goal: Moderate rubra without clots, no purulent discharge, no foul smelling lochia  Outcome: Ongoing  Goal: Empties bladder  Outcome: Ongoing  Goal: Verbalizes understanding of normal bowel function resumption  Outcome: Ongoing  Goal: Edema will be absent or minimal  Outcome: Ongoing  Goal: Breasts are soft with nipple integrity intact  Outcome: Ongoing  Goal: Demonstrates appropriate breast feeding techniques  Outcome: Ongoing  Goal: Appropriate behavior observed  Outcome: Ongoing  Goal: Positive Mother-Baby interactions are observed  Outcome: Ongoing  Goal: Perineum intact without discharge or hematoma  Outcome: Ongoing  Goal: Ambulates independently  Outcome: Ongoing     Problem: PAIN  Goal: Patient's pain/discomfort is manageable  Outcome: Ongoing     Problem: KNOWLEDGE DEFICIT  Goal: Patient/S.O. demonstrates understanding of

## 2021-08-13 NOTE — LACTATION NOTE
Visited. Mom says baby is breast feeding well. She denies concerns. I offer to assist and she is encouraged to call PRN.       Jackie Allen

## 2021-08-13 NOTE — ANESTHESIA POSTPROCEDURE EVALUATION
Department of Anesthesiology  Postprocedure Note    Patient: Eladia Klein  MRN: [id number]  YOB: 1995  Date of evaluation: 8/13/2021  Time:  2:54 PM     Procedure Summary     Date: 08/12/21 Room / Location:     Anesthesia Start: 1430 Anesthesia Stop: 3826    Procedure: Labor Analgesia Diagnosis:     Scheduled Providers:  Responsible Provider: MICAH Crow CRNA    Anesthesia Type: epidural ASA Status: 2          Anesthesia Type: epidural    Shawn Phase I: Shawn Score: 10    Shawn Phase II:      Last vitals: Reviewed and per EMR flowsheets.        Anesthesia Post Evaluation    Patient location during evaluation: floor  Patient participation: complete - patient participated  Level of consciousness: awake and alert  Pain score: 0  Nausea & Vomiting: no nausea and no vomiting  Complications: no  Cardiovascular status: blood pressure returned to baseline  Respiratory status: acceptable  Hydration status: euvolemic

## 2021-08-14 VITALS
OXYGEN SATURATION: 100 % | HEART RATE: 71 BPM | TEMPERATURE: 98.6 F | SYSTOLIC BLOOD PRESSURE: 100 MMHG | HEIGHT: 63 IN | RESPIRATION RATE: 16 BRPM | BODY MASS INDEX: 25.69 KG/M2 | DIASTOLIC BLOOD PRESSURE: 62 MMHG | WEIGHT: 145 LBS

## 2021-08-14 PROBLEM — Z34.83 NORMAL PREGNANCY IN MULTIGRAVIDA IN THIRD TRIMESTER: Status: RESOLVED | Noted: 2021-08-12 | Resolved: 2021-08-14

## 2021-08-14 PROCEDURE — 6370000000 HC RX 637 (ALT 250 FOR IP): Performed by: OBSTETRICS & GYNECOLOGY

## 2021-08-14 RX ORDER — IBUPROFEN 800 MG/1
800 TABLET ORAL EVERY 8 HOURS PRN
Qty: 90 TABLET | Refills: 3 | Status: SHIPPED | OUTPATIENT
Start: 2021-08-14

## 2021-08-14 RX ORDER — HYDROCODONE BITARTRATE AND ACETAMINOPHEN 5; 325 MG/1; MG/1
1 TABLET ORAL EVERY 6 HOURS PRN
Qty: 10 TABLET | Refills: 0 | Status: SHIPPED | OUTPATIENT
Start: 2021-08-14 | End: 2021-08-17

## 2021-08-14 RX ADMIN — IBUPROFEN 800 MG: 800 TABLET, FILM COATED ORAL at 00:40

## 2021-08-14 RX ADMIN — HYDROCODONE BITARTRATE AND ACETAMINOPHEN 1 TABLET: 5; 325 TABLET ORAL at 09:03

## 2021-08-14 RX ADMIN — DOCUSATE SODIUM 100 MG: 100 CAPSULE, LIQUID FILLED ORAL at 09:03

## 2021-08-14 RX ADMIN — HYDROCODONE BITARTRATE AND ACETAMINOPHEN 1 TABLET: 5; 325 TABLET ORAL at 00:40

## 2021-08-14 ASSESSMENT — PAIN SCALES - GENERAL
PAINLEVEL_OUTOF10: 5
PAINLEVEL_OUTOF10: 0
PAINLEVEL_OUTOF10: 4

## 2021-08-14 ASSESSMENT — PAIN DESCRIPTION - LOCATION: LOCATION: ABDOMEN

## 2021-08-14 ASSESSMENT — PAIN DESCRIPTION - PAIN TYPE: TYPE: ACUTE PAIN

## 2021-08-14 ASSESSMENT — PAIN - FUNCTIONAL ASSESSMENT
PAIN_FUNCTIONAL_ASSESSMENT: ACTIVITIES ARE NOT PREVENTED

## 2021-08-14 ASSESSMENT — PAIN DESCRIPTION - FREQUENCY: FREQUENCY: INTERMITTENT

## 2021-08-14 ASSESSMENT — PAIN DESCRIPTION - ONSET: ONSET: GRADUAL

## 2021-08-14 ASSESSMENT — PAIN DESCRIPTION - PROGRESSION: CLINICAL_PROGRESSION: GRADUALLY WORSENING

## 2021-08-14 ASSESSMENT — PAIN DESCRIPTION - ORIENTATION: ORIENTATION: MID;LOWER

## 2021-08-14 ASSESSMENT — PAIN DESCRIPTION - DESCRIPTORS: DESCRIPTORS: CRAMPING;DISCOMFORT;SORE

## 2021-08-14 NOTE — DISCHARGE SUMMARY
Obstetrical Discharge Form    Gestational Age:  36w3d    Antepartum complications: none    Date of Delivery:   21      Type of Delivery:   vaginal, spontaneous    Delivered By:  Gita Rabago               Baby:       Information for the patient's :  Kailyn Garza [[id number]]   Birthweight: 7 lb 3.6 oz (3.277 kg) (7lb 3.6oz/3.277kg)     Anesthesia:    Epidural    Intrapartum complications: None    Feeding method:   breast    Postpartum complications: none    Discharge Date:  21     Condition of discharge:  excellent    Plan:   Follow up    in 6 week(s)

## 2021-08-14 NOTE — FLOWSHEET NOTE
ID'd with Baby. Discussed discharge instructions and followup appts. Rx's for motrin and Norco given. To see Dr in 6 weeks or sooner if needed. Had tdap. Discharged ambulatory with fob and Baby secured in carseat. Is pink and warm with no apparent distress.

## 2021-08-14 NOTE — PLAN OF CARE
Problem: VAGINAL DELIVERY - RECOVERY AND POST PARTUM  Goal: Vital signs are medically acceptable  8/13/2021 2142 by Rupali Burgos RN  Outcome: Ongoing  8/13/2021 1717 by Jesús King RN  Outcome: Ongoing  Goal: Patient will remain free of falls  8/13/2021 2142 by Rupali Burgos RN  Outcome: Ongoing  8/13/2021 1717 by Jesús King RN  Outcome: Ongoing  Goal: Fundus firm at midline  8/13/2021 2142 by Rupali Burgos RN  Outcome: Ongoing  8/13/2021 1717 by Jesús King RN  Outcome: Ongoing  Goal: Moderate rubra without clots, no purulent discharge, no foul smelling lochia  8/13/2021 2142 by Rupali Burgos RN  Outcome: Ongoing  8/13/2021 1717 by Jesús King RN  Outcome: Ongoing  Goal: Empties bladder  8/13/2021 2142 by Rupali Burgos RN  Outcome: Ongoing  8/13/2021 1717 by Jesús King RN  Outcome: Ongoing  Goal: Verbalizes understanding of normal bowel function resumption  8/13/2021 2142 by Rupali Burgos RN  Outcome: Ongoing  8/13/2021 1717 by Jesús King RN  Outcome: Ongoing  Goal: Edema will be absent or minimal  8/13/2021 2142 by Rupali Burgos RN  Outcome: Ongoing  8/13/2021 1717 by Jesús King RN  Outcome: Ongoing  Goal: Breasts are soft with nipple integrity intact  8/13/2021 2142 by Rupali Burgos RN  Outcome: Ongoing  8/13/2021 1717 by Jesús King RN  Outcome: Ongoing  Goal: Demonstrates appropriate breast feeding techniques  8/13/2021 2142 by Rupali Burgos RN  Outcome: Ongoing  8/13/2021 1717 by Jesús King RN  Outcome: Ongoing  Goal: Appropriate behavior observed  8/13/2021 2142 by Rupali Burgos RN  Outcome: Ongoing  8/13/2021 1717 by Jesús King RN  Outcome: Ongoing  Goal: Positive Mother-Baby interactions are observed  8/13/2021 2142 by Rupali Burgos RN  Outcome: Ongoing  8/13/2021 1717 by Jesús King RN  Outcome: Ongoing  Goal: Perineum intact without discharge or hematoma  8/13/2021 2142 by Rupali Burgos RN  Outcome: Ongoing  8/13/2021 1717 by Randy Montano RN  Outcome: Ongoing  Goal: Ambulates independently  8/13/2021 2142 by Esther Gannon RN  Outcome: Ongoing  8/13/2021 1717 by Randy Montano RN  Outcome: Ongoing     Problem: PAIN  Goal: Patient's pain/discomfort is manageable  8/13/2021 2142 by Esther Gannon RN  Outcome: Ongoing  8/13/2021 1717 by Randy Montano RN  Outcome: Ongoing     Problem: KNOWLEDGE DEFICIT  Goal: Patient/S.O. demonstrates understanding of disease process, treatment plan, medications, and discharge instructions.   8/13/2021 2142 by Esther Gannon RN  Outcome: Ongoing  8/13/2021 1717 by Randy Montano RN  Outcome: Ongoing

## 2021-08-14 NOTE — PROGRESS NOTES
Subjective:     Postpartum Day 2:   The patient feels well. The patient denies emotional concerns. Pain is well controlled with current medications. The baby iswell. The patient is ambulating well. The patient is tolerating a normal diet. Objective:        Vitals:    08/14/21 0545   BP: (!) 95/57   Pulse: 70   Resp: 16   Temp: 98.8 °F (37.1 °C)   SpO2: 98%       Lab Results   Component Value Date    WBC 11.3 (H) 08/12/2021    HGB 11.9 (L) 08/12/2021    HCT 37.4 08/12/2021    MCV 94.0 08/12/2021     08/12/2021       General:    alert, appears stated age and cooperative       Lochia:  appropriate   Uterine    firm       DVT Evaluation:  No evidence of DVT seen on physical exam.     Assessment:     Postpartum day 2 Doing well post delivery. Plan:     Discharge home with standard precautions and return to clinic in 4-6 weeks.     Suzie Arthur MD 8/14/2021 11:00 AM

## 2021-09-28 ENCOUNTER — OFFICE VISIT (OUTPATIENT)
Dept: FAMILY MEDICINE CLINIC | Age: 26
End: 2021-09-28
Payer: COMMERCIAL

## 2021-09-28 DIAGNOSIS — J02.9 SORE THROAT: Primary | ICD-10-CM

## 2021-09-28 DIAGNOSIS — H92.03 OTALGIA OF BOTH EARS: ICD-10-CM

## 2021-09-28 LAB — S PYO AG THROAT QL: NORMAL

## 2021-09-28 PROCEDURE — 1036F TOBACCO NON-USER: CPT | Performed by: PHYSICIAN ASSISTANT

## 2021-09-28 PROCEDURE — 99213 OFFICE O/P EST LOW 20 MIN: CPT | Performed by: PHYSICIAN ASSISTANT

## 2021-09-28 PROCEDURE — G8427 DOCREV CUR MEDS BY ELIG CLIN: HCPCS | Performed by: PHYSICIAN ASSISTANT

## 2021-09-28 PROCEDURE — 87880 STREP A ASSAY W/OPTIC: CPT | Performed by: PHYSICIAN ASSISTANT

## 2021-09-28 PROCEDURE — G8419 CALC BMI OUT NRM PARAM NOF/U: HCPCS | Performed by: PHYSICIAN ASSISTANT

## 2021-09-28 NOTE — PROGRESS NOTES
9/28/21  [facility] [place]  1995    FLU/COVID-19 CLINIC EVALUATION    HPI SYMPTOMS:    Employer:[employer]    [] Fevers  [] Chills  [] Cough  [] Coughing up blood  [] Chest Congestion  [] Nasal Congestion  [] Feeling short of breath  [] Sometimes  [] Frequently  [] All the time  [] Chest pain  [] Headaches  []Tolerable  [] Severe  [x] Sore throat  [] Muscle aches  [] Nausea  [] Vomiting  []Unable to keep fluids down  [] Diarrhea  []Severe    [x] OTHER SYMPTOMS:    Bilateral ear pain that travels down the neck    Symptom Duration:   [] 1  [] 2   [x] 3   [] 4    [] 5   [] 6   [] 7   [] 8   [] 9   [] 10   [] 11   [] 12   [] 13   [] 14   [] Longer than 14 days    Symptom course:   [] Worsening     [x] Stable     [] Improving    RISK FACTORS:    [] Pregnant or possibly pregnant  [] Age over 61  [] Diabetes  [] Heart disease  [] Asthma  [] COPD/Other chronic lung diseases  [] Active Cancer  [] On Chemotherapy  [] Taking oral steroids  [] History Lymphoma/Leukemia  [] Close contact with a lab confirmed COVID-19 patient within 14 days of symptom onset  [] History of travel from affected geographical areas within 14 days of symptom onset       VITALS:  There were no vitals filed for this visit. TESTS:    POCT FLU:  [] Positive     []Negative    ASSESSMENT:    [] Flu  [] Possible COVID-19  [] Strep    PLAN:    [] Discharge home with written instructions for:  [] Flu management  [] Possible COVID-19 infection with self-quarantine and management of symptoms  [] Follow-up with primary care physician or emergency department if worsens  [] Evaluation per physician/NP/PA in clinic  [] Sent to ER       An  electronic signature was used to authenticate this note.      --Jennifer Keen on 9/28/2021 at 4:19 PM

## 2021-09-28 NOTE — PATIENT INSTRUCTIONS
Rapid strep test negative  Throat culture pending (If positive, we will start Amoxicillin. If negative, likely viral as suspected)  Drink lots of cold fluids  Gargle warm salt water as needed  Tylenol as needed  Chloraseptic spray or Cepacol lozenges over-the-counter as needed  For any developing nasal congestion, be careful with decongestants and antihistamines as they may dry up your breastmilk   F/U with PCP as needed          Patient Education        Sore Throat: Care Instructions  Your Care Instructions     Infection by bacteria or a virus causes most sore throats. Cigarette smoke, dry air, air pollution, allergies, and yelling can also cause a sore throat. Sore throats can be painful and annoying. Fortunately, most sore throats go away on their own. If you have a bacterial infection, your doctor may prescribe antibiotics. Follow-up care is a key part of your treatment and safety. Be sure to make and go to all appointments, and call your doctor if you are having problems. It's also a good idea to know your test results and keep a list of the medicines you take. How can you care for yourself at home? · If your doctor prescribed antibiotics, take them as directed. Do not stop taking them just because you feel better. You need to take the full course of antibiotics. · Gargle with warm salt water once an hour to help reduce swelling and relieve discomfort. Use 1 teaspoon of salt mixed in 1 cup of warm water. · Take an over-the-counter pain medicine, such as acetaminophen (Tylenol), ibuprofen (Advil, Motrin), or naproxen (Aleve). Read and follow all instructions on the label. · Be careful when taking over-the-counter cold or flu medicines and Tylenol at the same time. Many of these medicines have acetaminophen, which is Tylenol. Read the labels to make sure that you are not taking more than the recommended dose. Too much acetaminophen (Tylenol) can be harmful. · Drink plenty of fluids.  Fluids may help

## 2021-09-28 NOTE — PROGRESS NOTES
9/28/2021    HPI:  Chief complaint and history of present illness as per medical assistant/nurse documented today in the Flu/COVID-19 clinic. Patient presents to the clinic today to be tested for strep throat. She states she has had a 3-day history of sore throat. Yesterday, she reports bilateral ear pain that radiates to the neck and into the throat. She denies any other symptoms. She denies chest pain, shortness of breath, wheezing, stridor, dysphagia. She denies neck pain or stiffness, fever or chills. She denies cough, nasal congestion or chest congestion. She states that she tested positive for COVID-19 4 to 5 weeks ago and did clear those symptoms just fine. She has not tried any over-the-counter medications for her current symptoms. She reports good p.o. intake and urine output. She just gave birth to a baby girl 7 weeks ago. She has not had a menstrual cycle since before getting pregnant. She is breast-feeding. She is a student at [employer]. MEDICATIONS:  Prior to Visit Medications    Medication Sig Taking? Authorizing Provider   ibuprofen (ADVIL;MOTRIN) 800 MG tablet Take 1 tablet by mouth every 8 hours as needed for Pain  Mahsa Anguiano MD   Prenatal MV-Min-Fe Fum-FA-DHA (PRENATAL 1 PO) Take 1 tablet by mouth daily  Historical Provider, MD   Compression Stockings MISC by Does not apply route 15 to 20 mmHG  Cindy Dhillon MD       No Known Allergies,   Past Medical History:   Diagnosis Date    H/O cardiovascular stress test 02/26/2021    normal stress    H/O echocardiogram 02/23/2021    EF 55-60% no evidence of pericardial effusion no significant valvular abnormalities       PHYSICAL EXAM:  Physical Exam  Temp:  96.9 F  Heart Rate:   70  Pulse Ox: 97%    Constitutional:  Well developed, well nourished  HENT:  Normocephalic, atraumatic, bilateral external ears normal, bilateral ear canals and TMs normal, oropharynx moist, no appreciable erythema. No petechiae or exudate. No oral lesions. No abscess. Uvula midline and benign and airway patent. Nose normal.  Eyes:  conjunctiva normal, no discharge, no scleral icterus  Cardiovascular:  Normal heart rate, normal rhythm, no murmurs, gallops or rubs  Thorax & Lungs:  Normal breath sounds, no respiratory distress, no wheezing, no rales, no rhonchi  Skin:  Warm, dry, no erythema, no rash  Neurologic:  Alert & oriented   Psychiatric:  Affect normal, mood normal    ASSESSMENT/PLAN:  1. Sore throat  - POCT rapid strep A  - Culture, Throat    2. Otalgia of both ears    Rapid strep test negative  Throat culture pending (If positive, we will start Amoxicillin. If negative, likely viral as suspected)  Drink lots of cold fluids  Gargle warm salt water as needed  Tylenol as needed  Chloraseptic spray or Cepacol lozenges over-the-counter as needed  For any developing nasal congestion, be careful with decongestants and antihistamines as they may dry up your breastmilk   F/U with PCP as needed    FOLLOW-UP:  No follow-ups on file.         [facility]

## 2021-09-30 LAB — THROAT CULTURE: NORMAL

## 2021-10-11 ENCOUNTER — OFFICE VISIT (OUTPATIENT)
Dept: FAMILY MEDICINE CLINIC | Age: 26
End: 2021-10-11
Payer: COMMERCIAL

## 2021-10-11 VITALS
WEIGHT: 133 LBS | SYSTOLIC BLOOD PRESSURE: 100 MMHG | OXYGEN SATURATION: 96 % | HEIGHT: 63 IN | DIASTOLIC BLOOD PRESSURE: 62 MMHG | BODY MASS INDEX: 23.57 KG/M2 | HEART RATE: 80 BPM

## 2021-10-11 DIAGNOSIS — I95.1 ORTHOSTATIC HYPOTENSION: ICD-10-CM

## 2021-10-11 DIAGNOSIS — E16.2 HYPOGLYCEMIA: Primary | ICD-10-CM

## 2021-10-11 PROCEDURE — 90674 CCIIV4 VAC NO PRSV 0.5 ML IM: CPT | Performed by: FAMILY MEDICINE

## 2021-10-11 PROCEDURE — G8482 FLU IMMUNIZE ORDER/ADMIN: HCPCS | Performed by: FAMILY MEDICINE

## 2021-10-11 PROCEDURE — G8420 CALC BMI NORM PARAMETERS: HCPCS | Performed by: FAMILY MEDICINE

## 2021-10-11 PROCEDURE — G8427 DOCREV CUR MEDS BY ELIG CLIN: HCPCS | Performed by: FAMILY MEDICINE

## 2021-10-11 PROCEDURE — 99213 OFFICE O/P EST LOW 20 MIN: CPT | Performed by: FAMILY MEDICINE

## 2021-10-11 PROCEDURE — 90471 IMMUNIZATION ADMIN: CPT | Performed by: FAMILY MEDICINE

## 2021-10-11 PROCEDURE — 1036F TOBACCO NON-USER: CPT | Performed by: FAMILY MEDICINE

## 2021-10-11 NOTE — PROGRESS NOTES
Used   Vaping Use    Vaping Use: Never used   Substance and Sexual Activity    Alcohol use: No    Drug use: No    Sexual activity: Yes     Partners: Male   Other Topics Concern    Not on file   Social History Narrative    Not on file     Social Determinants of Health     Financial Resource Strain: Low Risk     Difficulty of Paying Living Expenses: Not hard at all   Food Insecurity: No Food Insecurity    Worried About Running Out of Food in the Last Year: Never true    [place] of Food in the Last Year: Never true   Transportation Needs: No Transportation Needs    Lack of Transportation (Medical): No    Lack of Transportation (Non-Medical): No   Physical Activity:     Days of Exercise per Week:     Minutes of Exercise per Session:    Stress:     Feeling of Stress :    Social Connections:     Frequency of Communication with Friends and Family:     Frequency of Social Gatherings with Friends and Family:     Attends Hinduism Services:     Active Member of Clubs or Organizations:     Attends Club or Organization Meetings:     Marital Status:    Intimate Partner Violence:     Fear of Current or Ex-Partner:     Emotionally Abused:     Physically Abused:     Sexually Abused:         SURGICAL HISTORY  Past Surgical History:   Procedure Laterality Date    APPENDECTOMY  08/2017    LYMPHADENECTOMY      TONSILLECTOMY         CURRENT MEDICATIONS  Current Outpatient Medications   Medication Sig Dispense Refill    ibuprofen (ADVIL;MOTRIN) 800 MG tablet Take 1 tablet by mouth every 8 hours as needed for Pain 90 tablet 3    Prenatal MV-Min-Fe Fum-FA-DHA (PRENATAL 1 PO) Take 1 tablet by mouth daily       No current facility-administered medications for this visit. ALLERGIES  No Known Allergies    PHYSICAL EXAM  /62   Pulse 80   Ht 5' 3\" (1.6 m)   Wt 133 lb (60.3 kg)   SpO2 96%   BMI 23.56 kg/m²     ASSESSMENT & PLAN    1.  Hypoglycemia  Discussed eating more than carbs and milk for breakfast  Recommended eating dinner leftovers  Consider cottage cheese and/or yogurt. Rule out interval onset diabetes although doubt  Check liver    - Hemoglobin A1C; Future  - Comprehensive Metabolic Panel; Future    2. Orthostatic hypotension  Push fluids  Follow-up if problems persisting.     Follow-up as needed       Electronically signed by Sarah Oglesby MD on 10/11/2021

## 2021-10-12 DIAGNOSIS — R79.89 ELEVATED LFTS: Primary | ICD-10-CM

## 2021-10-14 NOTE — FLOWSHEET NOTE
Discharged ambulatory no apparent distress. What Type Of Note Output Would You Prefer (Optional)?: Standard Output How Severe Is Your Skin Lesion?: moderate Has Your Skin Lesion Been Treated?: not been treated Is This A New Presentation, Or A Follow-Up?: Skin Lesions

## 2021-10-25 ENCOUNTER — OFFICE VISIT (OUTPATIENT)
Dept: CARDIOLOGY CLINIC | Age: 26
End: 2021-10-25
Payer: COMMERCIAL

## 2021-10-25 VITALS
HEART RATE: 60 BPM | HEIGHT: 63 IN | DIASTOLIC BLOOD PRESSURE: 70 MMHG | WEIGHT: 134.2 LBS | BODY MASS INDEX: 23.78 KG/M2 | SYSTOLIC BLOOD PRESSURE: 100 MMHG

## 2021-10-25 DIAGNOSIS — R55 SYNCOPE AND COLLAPSE: ICD-10-CM

## 2021-10-25 DIAGNOSIS — I95.1 ORTHOSTATIC HYPOTENSION: Primary | ICD-10-CM

## 2021-10-25 PROCEDURE — G8420 CALC BMI NORM PARAMETERS: HCPCS | Performed by: INTERNAL MEDICINE

## 2021-10-25 PROCEDURE — G8427 DOCREV CUR MEDS BY ELIG CLIN: HCPCS | Performed by: INTERNAL MEDICINE

## 2021-10-25 PROCEDURE — G8482 FLU IMMUNIZE ORDER/ADMIN: HCPCS | Performed by: INTERNAL MEDICINE

## 2021-10-25 PROCEDURE — 99214 OFFICE O/P EST MOD 30 MIN: CPT | Performed by: INTERNAL MEDICINE

## 2021-10-25 PROCEDURE — 1036F TOBACCO NON-USER: CPT | Performed by: INTERNAL MEDICINE

## 2021-10-25 RX ORDER — BUSPIRONE HYDROCHLORIDE 10 MG/1
TABLET ORAL
COMMUNITY
Start: 2021-10-14 | End: 2022-05-04

## 2021-10-25 NOTE — LETTER
Esther Newberry  1995  [id number]    Have you had any Chest Pain that is not new? - No          Have you had any Shortness of Breath - No      Have you had any dizziness - Yes  If Yes DO ORTHOSTATIC BP - when do you feel dizzy with position change   How long does it last .10  seconds      Sitting wait 5 minutes do supine (laying down) wait 5 minutes then do standing - log each in \"vitals\" area in Epic   Be sure to ask what symptoms they are having if they get dizzy while completing ortho stats such as: room spinning, nausea, etc.    Have you had any palpitations that are not new? - No      Is the patient on any of the following medications -   If Yes DO EKG - Needs done every 6 months    Do you have any edema - swelling in No        If we do not have these labs you are retrieve these labs for these providers!     Do you have a surgery or procedure scheduled in the near future - No       Ask patient if [facility] if they are not already signed [facility]     Check to see if we have an E-MAIL on file for the patient     Check medication list thoroughly!!! AND RECONCILE OUTSIDE MEDICATIONS  If dose has changed change the entire order not just the MG  BE SURE TO ASK PATIENT IF THEY NEED MEDICATION REFILLS     At check out add to every patient's \"wrap up\" the following dot phrase AFTERHOURSEDUCATION and ensure we explain this to our patients

## 2021-10-25 NOTE — PROGRESS NOTES
CARDIOLOGY    NOTE    Chief Complaint: Syncope     HPI:   Renita Garcia is a 27y.o. year old who has Past medical history as noted below. Renita Garcia is currently breast feeding , this is her second child who is 2 mth. She is still getting lightheaded and dizzy several times a week   her first pregnancy was terminated 6 weeks due to IUGR she was seen for recurrent dizzy spells and presyncope for last several weeks . After her last visit she was encouraged to wear compression stockings and increase fluid intake which is certainly helped but not completely resolved her problems stress test echo and 30-day event monitor did not show any significant abnormality except for episodes of sinus tachycardia  She is a nursing student   Mother has a pacemaker due to sick sinus syndrome and SVT which was put in in her 25s        Current Outpatient Medications   Medication Sig Dispense Refill    busPIRone (BUSPAR) 10 MG tablet       ibuprofen (ADVIL;MOTRIN) 800 MG tablet Take 1 tablet by mouth every 8 hours as needed for Pain (Patient not taking: Reported on 10/25/2021) 90 tablet 3    Prenatal MV-Min-Fe Fum-FA-DHA (PRENATAL 1 PO) Take 1 tablet by mouth daily (Patient not taking: Reported on 10/25/2021)       No current facility-administered medications for this visit. Allergies:   Patient has no known allergies.     Patient History:  Past Medical History:   Diagnosis Date    H/O cardiovascular stress test 02/26/2021    normal stress    H/O echocardiogram 02/23/2021    EF 55-60% no evidence of pericardial effusion no significant valvular abnormalities     Past Surgical History:   Procedure Laterality Date    APPENDECTOMY  08/2017    LYMPHADENECTOMY      TONSILLECTOMY       Family History   Problem Relation Age of Onset    Heart Disease Mother         pacemaker   [employer] Migraines Mother     Coronary Art Dis Other         GRANDFATHER AND GREAT GRANDFATHER (NOT SPECIFIED)    Diabetes Other         GRANDMOTHER - NON INSULIN DEPENDNET     Social History     Tobacco Use    Smoking status: Never Smoker    Smokeless tobacco: Never Used   Substance Use Topics    Alcohol use: No        Review of Systems:   · Constitutional: No Fever or Weight Loss   · Eyes: No Decreased Vision  · ENT: No Headaches, Hearing Loss or Vertigo  · Cardiovascular: as per note above   · Respiratory: No cough or wheezing and as per note above. · Gastrointestinal: No abdominal pain, appetite loss, blood in stools, constipation, diarrhea or heartburn  · Genitourinary: No dysuria, trouble voiding, or hematuria  · Musculoskeletal:  denies any new  joint aches , swelling  or pain   · Integumentary: No rash or pruritis  · Neurological: No TIA or stroke symptoms  · Psychiatric: No anxiety or depression  · Endocrine: No malaise, fatigue or temperature intolerance  · Hematologic/Lymphatic: No bleeding problems, blood clots or swollen lymph nodes  · Allergic/Immunologic: No nasal congestion or hives    Objective:      Physical Exam:  /70   Pulse 60   Ht 5' 3\" (1.6 m)   Wt 134 lb 3.2 oz (60.9 kg)   BMI 23.77 kg/m²   Wt Readings from Last 3 Encounters:   10/25/21 134 lb 3.2 oz (60.9 kg)   10/11/21 133 lb (60.3 kg)   08/12/21 145 lb (65.8 kg)     Body mass index is 23.77 kg/m². Vitals:    10/25/21 1143   BP: 100/70   Pulse: 60        General Appearance:  No distress, conversant  Constitutional:  Well developed, Well nourished, No acute distress, Non-toxic appearance. HENT:  Normocephalic, Atraumatic, Bilateral external ears normal, Oropharynx moist, No oral exudates, Nose normal. Neck- Normal range of motion, No tenderness, Supple, No stridor,no apical-carotid delay  Eyes:  PERRL, EOMI, Conjunctiva normal, No discharge. Respiratory:  Normal breath sounds, No respiratory distress, No wheezing, No chest tenderness. ,no use of accessory muscles, NO crackles  Cardiovascular: (PMI) apex non displaced,no lifts no thrills,S1 and S2 audible, No added heart sounds, No signs of ankle edema, or volume overload, No evidence of JVD, No crackles  GI:  Bowel sounds normal, Soft, No tenderness, No masses, No gross visceromegaly   :  No costovertebral angle tenderness   Musculoskeletal:  No edema, no tenderness, no deformities. Back- no tenderness  Integument:  Well hydrated, no rash   Lymphatic:  No lymphadenopathy noted   Neurologic:  Alert & oriented x 3, CN 2-12 normal, normal motor function, normal sensory function, no focal deficits noted   Psychiatric:  Speech and behavior appropriate       Medical decision making and Data review:  DATA:  No results found for: TROPONINT  BNP:  No results found for: PROBNP  PT/INR:  No results found for: PTINR  Lab Results   Component Value Date    LABA1C 5.1 10/11/2021     No results found for: CHOL, TRIG, HDL, LDLCALC, LDLDIRECT  Lab Results   Component Value Date    ALT 69 (H) 10/11/2021    AST 26 10/11/2021     No results for input(s): WBC, HGB, HCT, MCV, PLT in the last 72 hours. TSH: No results found for: TSH  Lab Results   Component Value Date    AST 26 10/11/2021    ALT 69 (H) 10/11/2021    BILITOT 0.4 10/11/2021    ALKPHOS 123 10/11/2021     No results found for: PROBNP  Lab Results   Component Value Date    LABA1C 5.1 10/11/2021     Lab Results   Component Value Date    WBC 11.3 (H) 08/12/2021    HGB 11.9 (L) 08/12/2021    HCT 37.4 08/12/2021     08/12/2021     Stress test  2/26/21   Summary   The patient exercised according to the [facility] for 7:01 min:s, achieving a   work level of Max. METS: 8.50. The resting heart rate of 110 bpm suzanne to a maximal heart rate of 171 bpm.   This value represents 86 % of the   maximal, age-predicted heart rate. The resting blood pressure of 90/60 mmHg   , suzanne to a maximum blood   pressure of 150/60 mmHg. The exercise test was stopped due to MET THR.     Echo 2/23/21  Summary   Left ventricular systolic function is normal with an ejection fraction of 55-60%. Normal pulmonary artery pressure with a RVSP of 18mmHg. No significant valvular abnormalities. No evidence of pericardial effusion. All labs, medications and tests reviewed by myself including data and history from outside source , patient and available family . Assessment & Plan:      1. Orthostatic hypotension    2. Syncope and collapse         Orthostatic hypotension  Her blood pressure dropped by 20 points when she is in 90s in the office . Encouraged to increase fluid intake mostly Gatorade avoid caffeine and high sugar fluids. Encouraged to wear compression stockings 15 to 20 mmHg encouraged to continue walking and gentle exercise, she is breast feeding therefore would like to avoid florinef and midodrine   sHe was counseled extensively, start florinef once she stops breast feeding if she is still symptomatc    Syncope and collapse  Most probably has orthostatic hypotension causing dizzy spells encouraged to lay down with legs elevated when she has symptoms. She had appropriate heart rate and blood pressure response on treadmill echo showed was normal.  30-day event monitor shows episodes of sinus tachycardia    Counseled extensively and medication compliance urged. Various goals were discussed and questions answered. Continue current medications. Appropriate prescriptions are addressed and refills ordered. Questions answered and patient verbalizes understanding. Call for any problems, questions, or concerns. Continue all other medications of all above medical condition listed as is. Return in about 6 months (around 4/25/2022). Please note this report has been partially produced using speech recognition software and may contain errors related to that system including errors in grammar, punctuation, and spelling, as well as words and phrases that may be inappropriate.  If there are any questions or concerns please feel free to contact the dictating provider for clarification.

## 2021-11-18 ENCOUNTER — TELEPHONE (OUTPATIENT)
Dept: FAMILY MEDICINE CLINIC | Age: 26
End: 2021-11-18

## 2021-11-18 NOTE — TELEPHONE ENCOUNTER
----- Message from Vincent Farzana sent at 11/18/2021 12:24 PM EST -----  Subject: Message to Provider    QUESTIONS  Information for Provider? pt called checking to see if blood work had been   sent in for her to complete. this is follow up from the appt   ---------------------------------------------------------------------------  --------------  [address]  What is the best way for the office to contact you? OK to leave message on   voicemail  Preferred Call Back Phone Number? [phone]  ---------------------------------------------------------------------------  --------------  SCRIPT ANSWERS  Relationship to Patient?  Self

## 2021-11-22 DIAGNOSIS — R79.89 ELEVATED LFTS: ICD-10-CM

## 2021-11-22 LAB
ALBUMIN SERPL-MCNC: 4.4 G/DL (ref 3.4–5)
ALP BLD-CCNC: 105 U/L (ref 40–129)
ALT SERPL-CCNC: 25 U/L (ref 10–40)
AST SERPL-CCNC: 16 U/L (ref 15–37)
BILIRUB SERPL-MCNC: 0.4 MG/DL (ref 0–1)
BILIRUBIN DIRECT: <0.2 MG/DL (ref 0–0.3)
BILIRUBIN, INDIRECT: NORMAL MG/DL (ref 0–1)
TOTAL PROTEIN: 6.9 G/DL (ref 6.4–8.2)

## 2021-11-23 ENCOUNTER — TELEPHONE (OUTPATIENT)
Dept: FAMILY MEDICINE CLINIC | Age: 26
End: 2021-11-23

## 2021-11-23 NOTE — TELEPHONE ENCOUNTER
----- Message from [employer] sent at 11/23/2021  3:00 PM EST -----  Subject: Message to Provider    QUESTIONS  Information for Provider? was returning the call about her test results   ---------------------------------------------------------------------------  --------------  [address]  What is the best way for the office to contact you? OK to leave message on   JIT [place], OK to respond with electronic message via [employer] portal (only   for patients who have registered [employer] account)  Preferred Call Back Phone Number?  [phone]  ---------------------------------------------------------------------------  --------------  SCRIPT ANSWERS  undefined

## 2022-04-01 ENCOUNTER — OFFICE VISIT (OUTPATIENT)
Dept: FAMILY MEDICINE CLINIC | Age: 27
End: 2022-04-01
Payer: COMMERCIAL

## 2022-04-01 VITALS
DIASTOLIC BLOOD PRESSURE: 60 MMHG | WEIGHT: 137.6 LBS | HEIGHT: 63 IN | SYSTOLIC BLOOD PRESSURE: 110 MMHG | HEART RATE: 84 BPM | OXYGEN SATURATION: 97 % | BODY MASS INDEX: 24.38 KG/M2

## 2022-04-01 DIAGNOSIS — M25.562 ACUTE PAIN OF LEFT KNEE: Primary | ICD-10-CM

## 2022-04-01 PROCEDURE — 99214 OFFICE O/P EST MOD 30 MIN: CPT | Performed by: PHYSICIAN ASSISTANT

## 2022-04-01 PROCEDURE — 1036F TOBACCO NON-USER: CPT | Performed by: PHYSICIAN ASSISTANT

## 2022-04-01 PROCEDURE — G8420 CALC BMI NORM PARAMETERS: HCPCS | Performed by: PHYSICIAN ASSISTANT

## 2022-04-01 PROCEDURE — G8427 DOCREV CUR MEDS BY ELIG CLIN: HCPCS | Performed by: PHYSICIAN ASSISTANT

## 2022-04-01 RX ORDER — IBUPROFEN 800 MG/1
800 TABLET ORAL EVERY 12 HOURS
Qty: 28 TABLET | Refills: 0 | Status: SHIPPED | OUTPATIENT
Start: 2022-04-01 | End: 2022-05-04

## 2022-04-01 NOTE — PROGRESS NOTES
4/1/2022    [address]     Chief Complaint   Patient presents with    Knee Pain     left ,  x's 1 week , pt denies injury , pt states knee feels like it will hyperextend when walking        HPI  History obtained from the patient. Fransisco Rushing is a 27 y.o. female who presents today for left knee issue X 1 week. Left Knee Issue - \"When I walk, it feels like it's going to pop backwards. There's a sharp, sharp pain when I'm walking or if I push on it. \" This has been occurring for the last week. \"I don't remember doing anything to it, but I'm always up and down. I have two kids. \" Ibuprofen and Tylenol haven't helped. Health Maintenance - Patient's care gaps include PAP smear, Tdap. Patient sees [facility], Dr. Huang Bonilla. She got Tdap there too. REVIEW OF SYMPTOMS  Review of Systems   Constitutional: Negative for chills and fever. Respiratory: Negative for cough and shortness of breath. Gastrointestinal: Negative for diarrhea and vomiting.      PAST MEDICAL HISTORY  Past Medical History:   Diagnosis Date    H/O cardiovascular stress test 02/26/2021    normal stress    H/O echocardiogram 02/23/2021    EF 55-60% no evidence of pericardial effusion no significant valvular abnormalities       FAMILY HISTORY  Family History   Problem Relation Age of Onset    Heart Disease Mother         pacemaker   [employer] Migraines Mother     Coronary Art Dis Other         GRANDFATHER AND GREAT GRANDFATHER (NOT SPECIFIED)    Diabetes Other         GRANDMOTHER - NON INSULIN DEPENDNET       SOCIAL HISTORY  Social History     Socioeconomic History    Marital status: Single     Spouse name: Not on file    Number of children: Not on file    Years of education: Not on file    Highest education level: Not on file   Occupational History    Not on file   Tobacco Use    Smoking status: Never Smoker    Smokeless tobacco: Never Used   Vaping Use    Vaping Use: Never used   Substance and Sexual Activity    Alcohol use: No    Drug use: No    Sexual activity: Yes     Partners: Male   Other Topics Concern    Not on file   Social History Narrative    Not on file     Social Determinants of Health     Financial Resource Strain: Low Risk     Difficulty of Paying Living Expenses: Not hard at all   Food Insecurity: No Food Insecurity    Worried About Running Out of Food in the Last Year: Never true    [place]obe of Food in the Last Year: Never true   Transportation Needs: No Transportation Needs    Lack of Transportation (Medical): No    Lack of Transportation (Non-Medical): No   Physical Activity:     Days of Exercise per Week: Not on file    Minutes of Exercise per Session: Not on file   Stress:     Feeling of Stress : Not on file   Social Connections:     Frequency of Communication with Friends and Family: Not on file    Frequency of Social Gatherings with Friends and Family: Not on file    Attends Yazidism Services: Not on file    Active Member of [address] [place] [address] or Organizations: Not on file    Attends Club or Organization Meetings: Not on file    Marital Status: Not on file   Intimate Partner Violence:     Fear of Current or Ex-Partner: Not on file    Emotionally Abused: Not on file    Physically Abused: Not on file    Sexually Abused: Not on file   Housing Stability:     Unable to Pay for Housing in the Last Year: Not on file    Number of [place] in the Last Year: Not on file    Unstable Housing in the Last Year: Not on file        SURGICAL HISTORY  Past Surgical History:   Procedure Laterality Date    APPENDECTOMY  08/2017    LYMPHADENECTOMY      TONSILLECTOMY         CURRENT MEDICATIONS  Current Outpatient Medications   Medication Sig Dispense Refill    ibuprofen (ADVIL;MOTRIN) 800 MG tablet Take 1 tablet by mouth every 12 hours for 14 days Take with food.  28 tablet 0    ibuprofen (ADVIL;MOTRIN) 800 MG tablet Take 1 tablet by mouth every 8 hours as needed for Pain 90 tablet 3    busPIRone (BUSPAR) 10 MG tablet  (Patient not taking: Reported on 4/1/2022)      Prenatal MV-Min-Fe Fum-FA-DHA (PRENATAL 1 PO) Take 1 tablet by mouth daily (Patient not taking: Reported on 10/25/2021)       No current facility-administered medications for this visit. ALLERGIES  No Known Allergies    RECENT LABS    Lab Results   Component Value Date    LABA1C 5.1 10/11/2021     Lab Results   Component Value Date    EAG 99.7 10/11/2021       No results found for: CHOL  No results found for: [address]    Lab Results   Component Value Date    WBC 11.3 (H) 08/12/2021    HGB 11.9 (L) 08/12/2021    HCT 37.4 08/12/2021    MCV 94.0 08/12/2021     08/12/2021       PHYSICAL EXAM  /60   Pulse 84   Ht 5' 3\" (1.6 m)   Wt 137 lb 9.6 oz (62.4 kg)   SpO2 97%   BMI 24.37 kg/m²     Physical Exam  Constitutional:       Appearance: Normal appearance. HENT:      Head: Normocephalic and atraumatic. Eyes:      Comments: EOM grossly intact. Cardiovascular:      Rate and Rhythm: Normal rate and regular rhythm. Heart sounds: No murmur heard. No friction rub. No gallop. Pulmonary:      Effort: Pulmonary effort is normal.      Breath sounds: Normal breath sounds. No wheezing, rhonchi or rales. Musculoskeletal:      Comments: No edema. Mild TTP to joint line. No Baker's Cyst. No gross deformities. Normal gait. Skin:     General: Skin is warm and dry. Neurological:      Mental Status: She is alert and oriented to person, place, and time. Comments: Cranial nerves II-XII grossly intact   Psychiatric:         Mood and Affect: Mood normal.         Behavior: Behavior normal.         ASSESSMENT & PLAN  1. Acute pain of left knee  Recommended meloxicam, but the patient is breast-feeding, and shorter acting NSAIDs are recommended during breast-feeding. Prescribed ibuprofen with food twice daily.   She is to take this for 10 to 14 days, wear a brace during the day, and ice her knee for about 20-minute intervals once or twice daily. If she does not get relief with these measures within the next 2 weeks, she is to call and let me know, and I will place referral to physical therapy. - ibuprofen (ADVIL;MOTRIN) 800 MG tablet; Take 1 tablet by mouth every 12 hours for 14 days Take with food. Dispense: 28     No follow-ups on file.             Electronically signed by Cristi Rocha PA-C on 4/1/2022

## 2022-04-05 ENCOUNTER — TELEPHONE (OUTPATIENT)
Dept: FAMILY MEDICINE CLINIC | Age: 27
End: 2022-04-05

## 2022-04-07 NOTE — TELEPHONE ENCOUNTER
CALLED PT DEQUAN POWER , NO ANSWER , VM LEFT ADVISING A RETURN CALL IF PT HAS ANY QUESTIONS ABOUT MYCHART MSG SENT TO HER DEQUAN POWER
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
## 2022-04-12 DIAGNOSIS — M25.562 ACUTE PAIN OF LEFT KNEE: Primary | ICD-10-CM

## 2022-04-25 ENCOUNTER — OFFICE VISIT (OUTPATIENT)
Dept: ORTHOPEDIC SURGERY | Age: 27
End: 2022-04-25
Payer: COMMERCIAL

## 2022-04-25 VITALS
OXYGEN SATURATION: 97 % | BODY MASS INDEX: 24.27 KG/M2 | HEART RATE: 76 BPM | RESPIRATION RATE: 16 BRPM | HEIGHT: 63 IN | WEIGHT: 137 LBS

## 2022-04-25 DIAGNOSIS — M22.8X2 PATELLAR MALTRACKING, LEFT: Primary | ICD-10-CM

## 2022-04-25 PROCEDURE — 99202 OFFICE O/P NEW SF 15 MIN: CPT | Performed by: PHYSICIAN ASSISTANT

## 2022-04-25 PROCEDURE — G8427 DOCREV CUR MEDS BY ELIG CLIN: HCPCS | Performed by: PHYSICIAN ASSISTANT

## 2022-04-25 PROCEDURE — G8420 CALC BMI NORM PARAMETERS: HCPCS | Performed by: PHYSICIAN ASSISTANT

## 2022-04-25 RX ORDER — DROSPIRENONE 4 MG/1
TABLET, FILM COATED ORAL
COMMUNITY
Start: 2022-04-13 | End: 2022-05-04

## 2022-04-25 ASSESSMENT — ENCOUNTER SYMPTOMS
GASTROINTESTINAL NEGATIVE: 1
RESPIRATORY NEGATIVE: 1
EYES NEGATIVE: 1

## 2022-04-25 NOTE — PROGRESS NOTES
Patient is in the office with left knee pain. Pain started about 2 months ago. Patient states that when she is walking she feel like the knee is going to hyperextend. Patient states that she was playing with her son about 2 months ago might have twisted the knee. Patient can not wear a knee brace gives the patient to much pain. Pain is mainly over the patella.

## 2022-04-25 NOTE — PATIENT INSTRUCTIONS
Left knee J Brace given  PT ordered  Work on ROM and strengthening exercises per PT protocol  Rest, ice, and elevate  Weightbearing as tolerated  Follow up in 8 weeks

## 2022-04-25 NOTE — PROGRESS NOTES
Review of Systems   Constitutional: Negative. HENT: Negative. Eyes: Negative. Respiratory: Negative. Cardiovascular: Negative. Gastrointestinal: Negative. Genitourinary: Negative. Musculoskeletal: Positive for arthralgias and myalgias. Skin: Negative. Neurological: Negative. Psychiatric/Behavioral: Negative. HPI:  Whitney Bush is a 27 y.o. female that presents the office today with complaints of left knee pain. She states she has had bilateral knee pain especially at night for a long time but recently over the last 3 or 4 weeks she feels like she did something to her left knee. She states it might happen when she was out in the yard with her child and her knee hyperextended but she is not exactly sure if that was when her knee started to hurt more. She feels like the kneecap wants to move laterally at times especially when she walks. She states that her knee pops and cracks a lot as well. She was given a brace by her PCP but there was too much pressure on her knee so she did not wear it. The patient was referred by Maryjo Holstein, PA-C   for Left knee pain.     Past Medical History:   Diagnosis Date    H/O cardiovascular stress test 02/26/2021    normal stress    H/O echocardiogram 02/23/2021    EF 55-60% no evidence of pericardial effusion no significant valvular abnormalities       Past Surgical History:   Procedure Laterality Date    APPENDECTOMY  08/2017    LYMPHADENECTOMY      TONSILLECTOMY         Family History   Problem Relation Age of Onset    Heart Disease Mother         pacemaker   [employer] Migraines Mother     Coronary Art Dis Other         GRANDFATHER AND GREAT GRANDFATHER (NOT SPECIFIED)    Diabetes Other         GRANDMOTHER - NON INSULIN DEPENDNET       Social History     Socioeconomic History    Marital status: Single     Spouse name: None    Number of children: None    Years of education: None    Highest education level: None   Occupational History    None   Tobacco Use    Smoking status: Never Smoker    Smokeless tobacco: Never Used   Vaping Use    Vaping Use: Never used   Substance and Sexual Activity    Alcohol use: No    Drug use: No    Sexual activity: Yes     Partners: Male   Other Topics Concern    None   Social History Narrative    None     Social Determinants of Health     Financial Resource Strain:     Difficulty of Paying Living Expenses: Not on file   Food Insecurity:     Worried About Running Out of Food in the Last Year: Not on file    Kobe of Food in the Last Year: Not on file   Transportation Needs:     Lack of Transportation (Medical): Not on file    Lack of Transportation (Non-Medical): Not on file   Physical Activity:     Days of Exercise per Week: Not on file    Minutes of Exercise per Session: Not on file   Stress:     Feeling of Stress : Not on file   Social Connections:     Frequency of Communication with Friends and Family: Not on file    Frequency of Social Gatherings with Friends and Family: Not on file    Attends Taoism Services: Not on file    Active Member of [address] [place] [address] or Organizations: Not on file    Attends Club or Organization Meetings: Not on file    Marital Status: Not on file   Intimate Partner Violence:     Fear of Current or Ex-Partner: Not on file    Emotionally Abused: Not on file    Physically Abused: Not on file    Sexually Abused: Not on file   Housing Stability:     Unable to Pay for Housing in the Last Year: Not on file    Number of [place] in the Last Year: Not on file    Unstable Housing in the Last Year: Not on file       Current Outpatient Medications   Medication Sig Dispense Refill    SLYND 4 MG TABS       ibuprofen (ADVIL;MOTRIN) 800 MG tablet Take 1 tablet by mouth every 8 hours as needed for Pain 90 tablet 3    ibuprofen (ADVIL;MOTRIN) 800 MG tablet Take 1 tablet by mouth every 12 hours for 14 days Take with food.  28 tablet 0    busPIRone (BUSPAR) 10 MG tablet (Patient not taking: Reported on 4/1/2022)      Prenatal MV-Min-Fe Fum-FA-DHA (PRENATAL 1 PO) Take 1 tablet by mouth daily (Patient not taking: Reported on 10/25/2021)       No current facility-administered medications for this visit. No Known Allergies    Review of Systems:  See above      Physical Exam:   Pulse 76   Resp 16   Ht 5' 3\" (1.6 m)   Wt 137 lb (62.1 kg)   SpO2 97%   BMI 24.27 kg/m²        Gait is Normal. The patient can bear weight on the injured extremity. Gen/Psych:Examination reveals a pleasant individual in no acute distress. The patient is oriented to time, place and person. The patient's mood and affect are appropriate. Patient appears well nourished. Body habitus is normal     Lymph:  no lymphedema in bilateral lower extremities     Skin intact in bilateral lower extremities with no ulcerations, lesions, rash, erythema. Vascular: There are no varicosities in bilateral lower extremities, sensation intact to light touch over bilateral lower extremities. left leg/knee exam:  Leg alignment:     neutral  Quadriceps/hamstring atrophy:   no  Knee effusion:    no   Knee erythema:   no  ROM:     3-135°  Lachman:    no  Pivot Shift:    no  Posterior drawer:   no  Lateral patella glide at 30 deg's: 25% with increased pain with lateral glide         Medial patella glide at 30 deg's: 10mm  Increased ER at 30 deg's:  no  Varus laxity at 0 and 30 deg's: no  Valguslaxity at 0 and 30 deg's: no  Recurvatum:    no  Tenderness at:   Lateral patellar facet    Knee strength is 5/5 flexion and extension  There is + L2-S1 motor and sensory function. Imaging studies  4 views of the left knee taken and reviewed in the office today show no acute fractures, no dislocations, no significant degenerative changes. Patella appears to be tracking centrally within the trochlear groove of the femur.   The official read and interpretation of these x-rays will be done by the the [facility]         Impression:     Diagnosis Orders   1. Patellar maltracking, left  [facility] [place]           Plan:   I explained to her that structurally her knee feels great and I do not feel any laxity within the knee. I feel that most of her pain is coming from her knee And likely represents some slight maltracking.   Patient Instructions   Left knee J Brace given  PT ordered  Work on ROM and strengthening exercises per PT protocol  Rest, ice, and elevate  Weightbearing as tolerated  Follow up in 8 weeks

## 2022-05-03 ENCOUNTER — HOSPITAL ENCOUNTER (OUTPATIENT)
Dept: PHYSICAL THERAPY | Age: 27
Setting detail: THERAPIES SERIES
Discharge: HOME | End: 2022-05-03
Payer: COMMERCIAL

## 2022-05-03 PROCEDURE — 97161 PT EVAL LOW COMPLEX 20 MIN: CPT

## 2022-05-03 PROCEDURE — 97110 THERAPEUTIC EXERCISES: CPT

## 2022-05-03 ASSESSMENT — PAIN DESCRIPTION - ONSET: ONSET: GRADUAL

## 2022-05-03 ASSESSMENT — PAIN DESCRIPTION - FREQUENCY: FREQUENCY: CONTINUOUS

## 2022-05-03 ASSESSMENT — PAIN SCALES - GENERAL: PAINLEVEL_OUTOF10: 5

## 2022-05-03 ASSESSMENT — PAIN DESCRIPTION - ORIENTATION: ORIENTATION: LEFT;ANTERIOR

## 2022-05-03 ASSESSMENT — PAIN DESCRIPTION - DESCRIPTORS: DESCRIPTORS: STABBING

## 2022-05-03 ASSESSMENT — PAIN DESCRIPTION - PAIN TYPE: TYPE: CHRONIC PAIN

## 2022-05-03 ASSESSMENT — PAIN - FUNCTIONAL ASSESSMENT: PAIN_FUNCTIONAL_ASSESSMENT: PREVENTS OR INTERFERES SOME ACTIVE ACTIVITIES AND ADLS

## 2022-05-03 ASSESSMENT — PAIN DESCRIPTION - LOCATION: LOCATION: KNEE

## 2022-05-03 NOTE — FLOWSHEET NOTE
Outpatient Physical Therapy  [place]           [x] Phone: [phone]   Fax: [phone]  [place]           [] Phone: [phone]   Fax: [phone]        Physical Therapy Daily Treatment Note  Date:  5/3/2022    Patient Name:  Chang Wu    :  1995  MRN: [id number]  Restrictions/Precautions: No data recorded      Diagnosis:   Other disorders of patella, left knee [M22.8X2]    Date of Injury/Surgery:   Treatment Diagnosis:  L knee pain, weakness, limited knee ext ROM  Insurance/Certification information: Payor: Lee Franklin / Plan: Alisa Carin / Product Type: *No Product type* /   Pre-cert   Referring Physician:  Opal Caballero PA-C     PCP: Ramirez Bledsoe MD  Next Doctor Visit:    Plan of care signed (Y/N):  N, sent 5/3/22   Outcome Measure: KOOS:  disability   Visit# / total visits:    per POC  Pain level: 4/10   Goals:     Patient goals: improve pain to ease ADLs. Short term goals  Time Frame for Short term goals: Defer to LTG. Long Term Goals  Time Frame for Long Term Goals: 6 weeks 22  Pt will demo I with HEP/symptom management. Pt will demo able to extend knee in supine and standing without increase in pain to ease gait mechanics  Pt will demo 5/5 knee flex/ext strength with >5# improvement per [employer] to ease transfers. Pt will demo KOOS <10/28 disability to demo improved function. Pt will demo sit to stand transfer and squat without increase in pain to ease ADLs. Summary of Evaluation:    Pt is 27year oldfe male with 2 month gradual onset of L>R knee pain with pain and crepitus. Pt now has difficulties completing ADLs and caring for children. Pt demo deficits this date that include reduced B LE strength, inability to extend L knee, min gait deficits and pain. Testing this date indicate signs and symptoms of patellar tendonitis/patellofemoral pain syndrome based on locality of pain.  Pt will benefit with PT services with progression of strength/ROM, manual and modalities to return to PLOF. Pt prior to onset of current condition had no pain with able to complete full ADLs and homecare activities. Patient received education on their current pathology and how their condition effects them with their functional activities. Patient understood discussion and questions were answered. Patient understands their activity limitations and understands rational for treatment progression. Subjective:  See pete         Any changes in Ambulatory Summary Sheet? None        Objective:  See eval   COVID screening questions were asked and patient attested that there had been no contact or symptoms        Exercises: (No more than 4 columns)   Exercise/Equipment 5/3/22  #1 Date Date           WARM UP         Nu step             TABLE      *Quad set with towel  10x2  3\"     *SLR 10x2     LAQ  3#    Bridge with SB      Resisted hamstring curl       SL Hip ABD                           STANDING      *TKE GTB 10x2     *Knee flexed marches  20x2     Shuttle leg press       FM bed stepping                              PROPRIOCEPTION                                    MODALITIES                      Other Therapeutic Activities/Education: Patient received education on their current pathology and how their condition effects them with their functional activities. Patient understood discussion and questions were answered. Patient understands their activity limitations and understands rational for treatment progression. Home Exercise Program:  HO issued, reviewed and discussed with patient. Pt agreed to comply. Manual Treatments:  Ice cup to L patellar tendon in supine with towel support x5' for pain management. Modalities:        Communication with other providers:  POC sent 5/3/22        Assessment:  (Response towards treatment session) (Pain Rating)    Pt is 27year oldfe male with 2 month gradual onset of L>R knee pain with pain and crepitus.  Pt now has difficulties completing ADLs and caring for children. Pt demo deficits this date that include reduced B LE strength, inability to extend L knee, min gait deficits and pain. Testing this date indicate signs and symptoms of patellar tendonitis/patellofemoral pain syndrome based on locality of pain. Pt will benefit with PT services with progression of strength/ROM, manual and modalities to return to PLOF. Pt prior to onset of current condition had no pain with able to complete full ADLs and homecare activities. Patient received education on their current pathology and how their condition effects them with their functional activities. Patient understood discussion and questions were answered. Patient understands their activity limitations and understands rational for treatment progression. Plan for Next Session:   Specific Instructions for Next Treatment: review HEP, open chain targeting L quad as tolerated, L hip in all directions strengthening, L knee terminal knee ext ROM as tolerated, manual cross friction/ice cup/graston to patellar tendon, modalities PRN.     Time In / Time Out:    [id number]       If [employer] Please Indicate Units for Rx this date and running total for each and overall total for Rx to date:  CPT Code Units today Running Total Units Total approved    TE 00816  1 1    MAN 07346       Gait 45321      NR 27331      TA  99775      Estim Unatt 12397       [address] [facility] [address]      [facility] [address]       ADL/Self care 20615      DNT 1-2 20560      DNT 3-4 20561      Other:    1 eval 1           Total for episode of care   2                Timed Code/Total Treatment Minutes:  15/55'  15' TE, 1 PT eval       Next Progress Note due:  eval 5/3/22  Visit 10       Plan of Care Interventions:  [x] Therapeutic Exercise  [x] Modalities:  [x] Therapeutic Activity     [] Ultrasound  [] Estim  [x] Gait Training      [] Cervical Traction [] Lumbar Traction  [x] Neuromuscular Re-education    [x] Cold/hotpack [] Iontophoresis   [x] Instruction in HEP      [x] Vasopneumatic   [] Dry Needling    [x] Manual Therapy               [] Aquatic Therapy              Electronically signed by:  Semaj Jones PT, DPT, OCS  5/3/2022, 11:21 AM    5/3/2022 11:22 AM

## 2022-05-03 NOTE — PRE-CERTIFICATION NOTE
Pt has [facility] allsavers as primary, no precert required.   30 hard max combined with OT/SP

## 2022-05-03 NOTE — PROGRESS NOTES
Physical Therapy: Initial Evaluation    Patient: Lowell Kennedy (23 y.o. 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] L knee maltracking  Treatment Diagnosis: L knee pain, weakness, limited knee ext ROM     PERTINENT MEDICAL HISTORY   Patient Assessed for Rehabilitation Services: Yes       Medical History: Chart Reviewed: Yes   Past Medical History:   Diagnosis Date    H/O cardiovascular stress test 2021    normal stress    H/O echocardiogram 2021    EF 55-60% no evidence of pericardial effusion no significant valvular abnormalities     Surgical History:   Past Surgical History:   Procedure Laterality Date    APPENDECTOMY  2017    LYMPHADENECTOMY      TONSILLECTOMY         Medications:   Current Outpatient Medications:     SLYND 4 MG TABS, , Disp: , Rfl:     ibuprofen (ADVIL;MOTRIN) 800 MG tablet, Take 1 tablet by mouth every 12 hours for 14 days Take with food. , Disp: 28 tablet, Rfl: 0    busPIRone (BUSPAR) 10 MG tablet, , Disp: , Rfl:     ibuprofen (ADVIL;MOTRIN) 800 MG tablet, Take 1 tablet by mouth every 8 hours as needed for Pain, Disp: 90 tablet, Rfl: 3    Prenatal MV-Min-Fe Fum-FA-DHA (PRENATAL 1 PO), Take 1 tablet by mouth daily (Patient not taking: Reported on 10/25/2021), Disp: , Rfl:   Allergies: Patient has no known allergies. SUBJECTIVE EXAMINATION      ,           Subjective History:    Subjective: 2 months gradual onset with ongoing L knee pain. Dependent on activity with increasing pain with closed chain activities or extending of the knee. No prior history of issues with the knee. Issued  J strap knee brace and has been wearing but slipping down overall. Pain remains the same overall. Taking ibuprofen but limited improvement. Placing ice every night to help with the pain. Does get crepitus into the knee as well. Remains functional with quick elevated pain that lingers but able to continue. Follow up at end of the month. No radiating symptoms.   Additional Pertinent Hx (if applicable):     Prior diagnostic testing[de-identified] X-ray      Learning/Language: Learning  Does the patient/guardian have any barriers to learning?: No barriers  Will there be a co-learner?: No  What is the preferred language of the patient/guardian?: English  Is an  required?: No  How does the patient/guardian prefer to learn new concepts?: Listening,Demonstration     Pain Screening    Pain Screening  Patient Currently in Pain: Yes  Pain Assessment: 0-10  Pain Level: 5  Best Pain Level: 2  Worst Pain Level: 8  Patient's Stated Pain Goal: 0 - No pain  Pain Type: Chronic pain  Pain Location: Knee  Pain Orientation: Left,Anterior  Pain Descriptors: Stabbing  Pain Frequency: Continuous  Pain Onset: Gradual  Functional Pain Assessment: Prevents or interferes some active activities and ADLs  Aggravating factors: Sitting,Movement,Lifting,Carrying,Walking  Pain Management/Relieving Factors: Rest,Ice    Functional Status    Dominant Hand: : Right    Social History:    Social History  Type of Home: House  Home Layout: One level  Bathroom Shower/Tub: Tub/Shower unit    Occupation/Interests:   Occupation: Student: College    Prior Level of Function:     Independent        Current Level of Function:          ADL Assistance: Independent  Homemaking Assistance: Independent  Ambulation Assistance: Independent  Transfer Assistance: Independent  Active : Yes  Mode of Transportation: Car    OBJECTIVE EXAMINATION   Restrictions:          None     Review of Systems:  Vision: Within Functional Limits  Hearing: Within functional limits  Overall Orientation Status: Within Normal Limits  Follows Commands: Within Functional Limits    Observations:   General Observations  Description: Entered with J brace on L knee. Able to stand without UE assistance. 5x sit to stand: 23.98 sec with 4/10 pain   Lack TKE with ambulation     Palpation:   Left Knee Palpation: patellar tendon tenderness . Fair (+) quad set with improvement in pain with towel roll    Balance Screen:   Single Leg Stance  Right Leg Eyes Open: 22 seconds  Left Leg Eyes Open: 16 seconds (no increase in pain.)      Left AROM  Right AROM         AROM LLE (degrees)  L Knee Flexion 0-145: 137  L Knee Extension 0: able to reach 0 deg with pain with preference at 3 deg    AROM RLE (degrees)  R Knee Flexion 0-145: 145  R Knee Extension 0: 0     Left PROM  Right PROM       WNL     WNL        Left Strength  Right Strength         Strength LLE  L Hip Flexion: 4+/5  L Hip Extension: 4/5  L Hip ABduction: 4+/5  L Hip ADduction: 4-/5  L Hip Internal Rotation: 5/5  L Hip External Rotation: 5/5  L Knee Flexion: 5/5 (Microfit: 16#)  L Knee Extension: 5/5 (Microfit: 28#)    Strength RLE  R Hip Flexion: 5/5  R Hip Extension: 4+/5  R Hip ABduction: 5/5  R Hip ADduction: 4+/5  R Hip Internal Rotation: 5/5  R Hip External Rotation: 5/5  R Knee Flexion: 5/5 (Microfit: 16#)  R Knee Extension: 4+/5 (Microfit: 28#)         Muscle Length/Flexibility:  WNL    Joint Mobility (if applicable):   Joint Integrity Knee  General Joint Integrity Knee: Left WNL,Right WNL  Right Patella Glides: crepitus with medial/lateral glides. Left Patella Glides: pain with all directions with most pain with superior glide, no crepitus    Special Tests:   Special Tests for Knee  Special Tests: Performed  Valgus Stress Test (MCL injury): L (-)  Varus Stress Test (LCL injury):  L (-)  Posterior Draw Test (PCL injury): L (-)  Ant. Drawer (ACL injury): L (-)  Lachman's (ACL injury): L (-)  Tori (Meniscus Lesion): L (-)         ASSESSMENT     Impression:   Pt is 27year oldfe male with 2 month gradual onset of L>R knee pain with pain and crepitus. Pt now has difficulties completing ADLs and caring for children. Pt demo deficits this date that include reduced B LE strength, inability to extend L knee, min gait deficits and pain. Testing this date indicate signs and symptoms of patellar tendonitis/patellofemoral pain syndrome based on locality of pain. Pt will benefit with PT services with progression of strength/ROM, manual and modalities to return to PLOF. Pt prior to onset of current condition had no pain with able to complete full ADLs and homecare activities. Patient received education on their current pathology and how their condition effects them with their functional activities. Patient understood discussion and questions were answered. Patient understands their activity limitations and understands rational for treatment progression. Body Structures, Functions, Activity Limitations Requiring Skilled Therapeutic Intervention: Decreased functional mobility ,Decreased ROM,Decreased strength,Decreased endurance    Statement of Medical Necessity: Physical Therapy is both indicated and medically necessary as outlined in the POC to increase the likelihood of meeting the functionally related goals stated below. Patient's Activity Tolerance: Patient tolerated evaluation without incident      Patient's rehabilitation potential/prognosis is considered to be: Good    Factors which may impact rehabilitation potential include: None        GOALS   Patient Goal(s): improve pain to ease ADLs. Short Term Goals Completed by Defer to LTG. Goal Status       Long Term Goals Completed by 6 weeks 6/17/22 Goal Status   Pt will demo I with HEP/symptom management.      Pt will demo able to extend knee in supine and standing without increase in pain to ease gait mechanics     Pt will demo 5/5 knee flex/ext strength with >5# improvement per Microfit to ease transfers. Pt will demo KOOS <10/28 disability to demo improved function. Pt will demo sit to stand transfer and squat without increase in pain to ease ADLs. TREATMENT PLAN       Requires PT Follow-Up: Yes  Specific Instructions for Next Treatment: review HEP, open chain targeting L quad as tolerated, L hip in all directions strengthening, L knee terminal knee ext ROM as tolerated, manual cross friction/ice cup/graston to patellar tendon, modalities PRN. Pt. actively involved in establishing Plan of Care and Goals: Yes  Patient/ Caregiver education and instruction: Earline  of Care,Evaluative findings,Insurance,Home Exercise Program             Treatment may include any combination of the following: Strengthening,ROM,Manual Therapy - Soft Tissue Mobilization,Neuromuscular re-education,Home exercise program,Gait training,Modalities     Frequency / Duration:  Patient to be seen 2 for 6 weeks      Eval Complexity: Overall Evaluation : Low  Decision Making: Low Complexity  History: Personal Factors and/or Comorbidities Impacting POC: Low  Examination of body system(s) including body structures and functions, activity limitations, and/or participation restrictions: Low  Clinical Presentation: Low  Clinical Decision Making : Low Complexity       Therapist Signature: Angélica Hunt PT , DPT, OCS    Date: 5/3/2022     5/3/2022 14:26 AM     I certify that the above Therapy Services are being furnished while the patient is under my care. I agree with the treatment plan and certify that this therapy is necessary.       [de-identified] Signature:  ___________________________   Date:_______                                                                   Willian Tafoya PA-C        Physician Comments: _______________________________________________    Please sign and return to   [facility]. Please fax to the location listed below.  Antionette Khan for this referral!    [address] [facility] [address], [address] [id number]  Dept: [phone]  Loc: [phone]

## 2022-05-03 NOTE — PLAN OF CARE
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 Please review the attached evaluation and/or summary of the patient's plan of care, and verify that you agree therapy should continue by signing the attached document and sending it back to our office. Assessment:      Pt is 27year oldfe male with 2 month gradual onset of L>R knee pain with pain and crepitus. Pt now has difficulties completing ADLs and caring for children. Pt demo deficits this date that include reduced B LE strength, inability to extend L knee, min gait deficits and pain. Testing this date indicate signs and symptoms of patellar tendonitis/patellofemoral pain syndrome based on locality of pain. Pt will benefit with PT services with progression of strength/ROM, manual and modalities to return to PLOF. Pt prior to onset of current condition had no pain with able to complete full ADLs and homecare activities. Patient received education on their current pathology and how their condition effects them with their functional activities. Patient understood discussion and questions were answered. Patient understands their activity limitations and understands rational for treatment progression.     Plan of Care/Treatment to date:  [x] Therapeutic Exercise  [x] Modalities:  [x] Therapeutic Activity     [] Ultrasound  [x] Electrical Stimulation  [x] Gait Training      [] Cervical Traction [] Lumbar Traction  [x] Neuromuscular Re-education    [x] Cold/hotpack [] Iontophoresis   [x] Instruction in HEP      [x] Vasopneumatic    [] Dry Needling  [x] Manual Therapy               [] Aquatic Therapy       Other:          Frequency/Duration:  # Days per week: [] 1 day # Weeks: [] 1 week [] 5 weeks     [x] 2 days   [] 2 weeks [x] 6 weeks     [] 3 days   [] 3 weeks [] 7 weeks     [] 4 days   [] 4 weeks [] 8 weeks         [] 9 weeks [] 10 weeks         [] 11 weeks [] 12 weeks    Rehab Potential/Progress: [] Excellent [x] Good [] Fair  [] Poor     Goals:    Patient goals: improve pain to ease ADLs. Short term goals  Time Frame for Short term goals: Defer to LTG. Long Term Goals  Time Frame for Long Term Goals: 6 weeks 6/17/22  Pt will demo I with HEP/symptom management. Pt will demo able to extend knee in supine and standing without increase in pain to ease gait mechanics  Pt will demo 5/5 knee flex/ext strength with >5# improvement per Microfit to ease transfers. Pt will demo KOOS <10/28 disability to demo improved function. Pt will demo sit to stand transfer and squat without increase in pain to ease ADLs. Electronically signed by:  David Dominguez, PT, DPT, OCS  5/3/2022, 11:19 AM    5/3/2022 11:19 AM         If you have any questions or concerns, please don't hesitate to call.   Thank you for your referral.      Physician Signature:________________________________Date:_________ TIME: _____  By signing above, therapists plan is approved by physician

## 2022-05-04 ENCOUNTER — OFFICE VISIT (OUTPATIENT)
Dept: CARDIOLOGY CLINIC | Age: 27
End: 2022-05-04
Payer: COMMERCIAL

## 2022-05-04 VITALS
HEIGHT: 63 IN | HEART RATE: 74 BPM | BODY MASS INDEX: 24.52 KG/M2 | WEIGHT: 138.4 LBS | SYSTOLIC BLOOD PRESSURE: 96 MMHG | DIASTOLIC BLOOD PRESSURE: 64 MMHG | OXYGEN SATURATION: 99 %

## 2022-05-04 DIAGNOSIS — R55 SYNCOPE AND COLLAPSE: Primary | ICD-10-CM

## 2022-05-04 PROCEDURE — 1036F TOBACCO NON-USER: CPT | Performed by: NURSE PRACTITIONER

## 2022-05-04 PROCEDURE — 93000 ELECTROCARDIOGRAM COMPLETE: CPT | Performed by: NURSE PRACTITIONER

## 2022-05-04 PROCEDURE — 99212 OFFICE O/P EST SF 10 MIN: CPT | Performed by: NURSE PRACTITIONER

## 2022-05-04 PROCEDURE — G8420 CALC BMI NORM PARAMETERS: HCPCS | Performed by: NURSE PRACTITIONER

## 2022-05-04 PROCEDURE — G8427 DOCREV CUR MEDS BY ELIG CLIN: HCPCS | Performed by: NURSE PRACTITIONER

## 2022-05-04 ASSESSMENT — ENCOUNTER SYMPTOMS
SHORTNESS OF BREATH: 0
COUGH: 0

## 2022-05-04 NOTE — PROGRESS NOTES
[facility] [place] [facility] [place]  [address]  Phone: [phone]    Fax [phone]    Paras Mcgee MD, Jeanniecheco Nielsen MD, [address] [facility], MD, MD Sharon Moss MD Lenon Land, MD Imagene Catalan, MD Franklin Delacruz, APRN      Sheila Kraft, APRN  Gayla Phipps, APRN     Aaron Eng, APRN    CARDIOLOGY  NOTE    2022    Chris Reyes (:  1995) is a 27 y.o. female,Established patient with Dr. Pan Ramirez, here for evaluation of the following chief complaint(s):  6 Month Follow-Up (SOBOE, palpitation with position change. ) and Dizziness (and lightheaded with position change.)        SUBJECTIVE/OBJECTIVE:    Renea Zuniga is a 27y.o. year old who has Past medical history as noted below. Renea Zuniga is currently breast feeding , this is her second child who is 6 months old. She is still getting lightheaded and dizzy every day with position changing both sitting and standing. She is trying to stay hydrated with water milk occasional soda. her first pregnancy was terminated 6 weeks due to IUGR. After her last visit she was encouraged to wear compression stockings but she did not feel they helped. Increase fluid intake which is certainly helped but not completely resolved her problems stress test echo and 30-day event monitor did not show any significant abnormality except for episodes of sinus tachycardia  She is a nursing student   Mother has a pacemaker due to sick sinus syndrome and SVT which was put in in her 25s         Review of Systems   Constitutional: Negative for fatigue and fever. Respiratory: Negative for cough and shortness of breath. Cardiovascular: Negative for chest pain, palpitations and leg swelling. Musculoskeletal: Negative for arthralgias and gait problem. Neurological: Positive for dizziness and light-headedness. Negative for syncope, weakness and headaches.        Vitals:    22 1029 22 1034 05/04/22 1039   BP: 98/70 102/66 96/64   Site: Left Upper Arm Left Upper Arm Left Upper Arm   Position: Standing Sitting Supine   Cuff Size: Medium Adult Medium Adult Medium Adult   Pulse: 122 97 74   SpO2: 97% 98% 99%   Weight: 138 lb 6.4 oz (62.8 kg)     Height: 5' 3\" (1.6 m)         Wt Readings from Last 3 Encounters:   05/04/22 138 lb 6.4 oz (62.8 kg)   04/25/22 137 lb (62.1 kg)   04/01/22 137 lb 9.6 oz (62.4 kg)       BP Readings from Last 3 Encounters:   05/04/22 96/64   04/01/22 110/60   10/25/21 100/70       Prior to Admission medications    Medication Sig Start Date End Date Taking? Authorizing Provider   ibuprofen (ADVIL;MOTRIN) 800 MG tablet Take 1 tablet by mouth every 8 hours as needed for Pain 8/14/21  Yes Suzie Arthur MD       Physical Exam  Vitals reviewed. Constitutional:       General: She is not in acute distress. Appearance: Normal appearance. She is not ill-appearing. HENT:      Head: Atraumatic. Neck:      Vascular: No carotid bruit. Cardiovascular:      Rate and Rhythm: Normal rate and regular rhythm. Pulses: Normal pulses. Heart sounds: Normal heart sounds. No murmur heard. Pulmonary:      Effort: Pulmonary effort is normal. No respiratory distress. Breath sounds: Normal breath sounds. Musculoskeletal:         General: No swelling or deformity. Cervical back: Neck supple. No muscular tenderness. Neurological:      Mental Status: She is alert.          Health Maintenance   Topic Date Due    Varicella vaccine (1 of 2 - 2-dose childhood series) Never done    Hepatitis C screen  Never done    Chlamydia screen  04/25/2020    DTaP/Tdap/Td vaccine (7 - Td or Tdap) 09/21/2020    Depression Screen  04/02/2022    COVID-19 Vaccine (3 - Booster for Pfizer series) 04/23/2022    Pap smear  04/25/2022    Hib vaccine  Completed    HPV vaccine  Completed    Flu vaccine  Completed    HIV screen  Completed    Hepatitis A vaccine  Aged C/ Travis Clarence 19 Hepatitis B vaccine  Aged Out    Meningococcal (ACWY) vaccine  Aged Out    Pneumococcal 0-64 years Vaccine  Aged Out       Stress test 2/2021   Summary   The patient exercised according to the [facility]FIL[facility] for 7:01 min:s, achieving a work level of Max. METS: 8.50. The resting heart rate of 110 bpm suzanne to a maximal heart rate of 171 bpm. This value represents 86 % of the   maximal, age-predicted heart rate. The resting blood pressure of 90/60 mmHg , suzanne to a maximum blood pressure of 150/60 mmHg. The exercise test was stopped due to MET THR. Echo 2/9/2021  Summary   Left ventricular systolic function is normal with an ejection fraction of 55-60%. Normal pulmonary artery pressure with a RVSP of 18mmHg. No significant valvular abnormalities. No evidence of pericardial effusion. ASSESSMENT/PLAN:    1. Syncope and collapse  Assessment & Plan:   No episodes since last OV  Continue to stay hydrated and wear compression stockings. 2. Orthostatic hypotension  Assessment & Plan:   Borderline controlled, lifestyle modifications recommended and continue to monitor   Stay hydrated try compression stocking now that she is post partum. Reviewed may need florinef if continues to have symptoms. Will wait until after finished breast feeding. Vitals:    05/04/22 1029 05/04/22 1034 05/04/22 1039   BP: 98/70 102/66 96/64   Site: Left Upper Arm Left Upper Arm Left Upper Arm   Position: Standing Sitting Supine   Cuff Size: Medium Adult Medium Adult Medium Adult   Pulse: 122 97 74   SpO2: 97% 98% 99%   Weight: 138 lb 6.4 oz (62.8 kg)     Height: 5' 3\" (1.6 m)           Return in about 6 months (around 11/4/2022). An electronic signature was used to authenticate this note.     Electronically signed by MICAH Conley CNP on 5/4/2022 at 10:49 AM

## 2022-05-11 ENCOUNTER — HOSPITAL ENCOUNTER (OUTPATIENT)
Dept: PHYSICAL THERAPY | Age: 27
Setting detail: THERAPIES SERIES
Discharge: HOME | End: 2022-05-11
Payer: COMMERCIAL

## 2022-05-11 PROCEDURE — 97110 THERAPEUTIC EXERCISES: CPT

## 2022-05-11 PROCEDURE — 97140 MANUAL THERAPY 1/> REGIONS: CPT

## 2022-05-11 NOTE — FLOWSHEET NOTE
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 Short term goals  Time Frame for Short term goals: Defer to LTG. Long Term Goals  Time Frame for Long Term Goals: 6 weeks 22  Pt will demo I with HEP/symptom management. Met - reports compliance   Pt will demo able to extend knee in supine and standing without increase in pain to ease gait mechanics  Pt will demo 5/5 knee flex/ext strength with >5# improvement per Microfit to ease transfers. Pt will demo KOOS <10/28 disability to demo improved function. Pt will demo sit to stand transfer and squat without increase in pain to ease ADLs. Summary of Evaluation:    Pt is 27year oldfe male with 2 month gradual onset of L>R knee pain with pain and crepitus. Pt now has difficulties completing ADLs and caring for children. Pt demo deficits this date that include reduced B LE strength, inability to extend L knee, min gait deficits and pain. Testing this date indicate signs and symptoms of patellar tendonitis/patellofemoral pain syndrome based on locality of pain.  Pt will benefit with PT services with progression of strength/ROM, manual and modalities to return to PLOF. Pt prior to onset of current condition had no pain with able to complete full ADLs and homecare activities. Patient received education on their current pathology and how their condition effects them with their functional activities. Patient understood discussion and questions were answered. Patient understands their activity limitations and understands rational for treatment progression. Subjective:  Tania Re arrives to therapy stating that the knee feels the same currently. Pain is about a 3/10 at the tibial tuberosity. She reports compliance with her HEP and no concerns with it at this time. Any changes in Ambulatory Summary Sheet? None        Objective:  COVID screening questions were asked and patient attested that there had been no contact or symptoms    Pain at the top of the range with LAQ. Tactile cues for proper positioning of leg during S/L hip abduction in order to achieve greater isolation of glut med. Exercises: (No more than 4 columns)   Exercise/Equipment 5/3/22  #1 5/11/2022 #2 Date           WARM UP      Nu step   L2 5'          TABLE      *Quad set with towel  10x2  3\" 20*    *SLR 10x2 20*    LAQ  3# 2*10 ecc focus     Bridge with SB  20*    Resisted hamstring curl   BTB 20*    SL Hip ABD  20*                         STANDING  NEXT (patient was only in a 30' spot today)    *TKE GTB 10x2     *Knee flexed marches  20x2     Shuttle leg press       FM bed stepping                              PROPRIOCEPTION                                    MODALITIES                      Other Therapeutic Activities/Education: none       Home Exercise Program:  Continue with current       Manual Treatments:  CFM to patellar tendon 5'. Ice cup to L patellar tendon in supine with towel support x5' for pain management. Modalities:  None       Communication with other providers:  POC sent 5/3/22        Assessment: Skylar tolerated today's session well. She has pain with terminal knee extension and is TTP at patellar tendon. End session pain: 0/10      Plan for Next Session:  review HEP, open chain targeting L quad as tolerated, L hip in all directions strengthening, L knee terminal knee ext ROM as tolerated, manual cross friction/ice cup/graston to patellar tendon, modalities PRN.       Time In / Time Out:    1100/1141       Timed Code/Total Treatment Minutes:  39' 1 man 10' 2 TE 31'      Next Progress Note due:  pete 5/3/22  Visit 10       Plan of Care Interventions:  [x] Therapeutic Exercise  [x] Modalities:  [x] Therapeutic Activity     [] Ultrasound  [] Estim  [x] Gait Training      [] Cervical Traction [] Lumbar Traction  [x] Neuromuscular Re-education    [x] Cold/hotpack [] Iontophoresis   [x] Instruction in HEP      [x] Vasopneumatic   [] Dry Needling    [x] Manual Therapy               [] Aquatic Therapy              Electronically signed by:  Abbe Aguirre PTA      5/11/2022 9:11 AM

## 2022-05-20 ENCOUNTER — HOSPITAL ENCOUNTER (OUTPATIENT)
Dept: PHYSICAL THERAPY | Age: 27
Setting detail: THERAPIES SERIES
Discharge: HOME | End: 2022-05-20
Payer: COMMERCIAL

## 2022-05-20 PROCEDURE — 97110 THERAPEUTIC EXERCISES: CPT

## 2022-05-20 NOTE — FLOWSHEET NOTE
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 Short term goals  Time Frame for Short term goals: Defer to LTG. Long Term Goals  Time Frame for Long Term Goals: 6 weeks 22  Pt will demo I with HEP/symptom management. Met - reports compliance   Pt will demo able to extend knee in supine and standing without increase in pain to ease gait mechanics  Pt will demo 5/5 knee flex/ext strength with >5# improvement per [employer] to ease transfers. Pt will demo KOOS <10/28 disability to demo improved function. Pt will demo sit to stand transfer and squat without increase in pain to ease ADLs. Summary of Evaluation:    Pt is 27year oldfe male with 2 month gradual onset of L>R knee pain with pain and crepitus. Pt now has difficulties completing ADLs and caring for children. Pt demo deficits this date that include reduced B LE strength, inability to extend L knee, min gait deficits and pain. Testing this date indicate signs and symptoms of patellar tendonitis/patellofemoral pain syndrome based on locality of pain.  Pt will benefit with PT services with progression of strength/ROM, manual and modalities to return to PLOF. Pt prior to onset of current condition had no pain with able to complete full ADLs and homecare activities. Patient received education on their current pathology and how their condition effects them with their functional activities. Patient understood discussion and questions were answered. Patient understands their activity limitations and understands rational for treatment progression. Subjective:  Patient reports pain 4/10 patellar tendon and up lateral and medial knee. HEP compliant. Reports the QS and the SLR hurt the most. Pain started 2 months ago . She had a baby 9 months ago. Any changes in Ambulatory Summary Sheet? None        Objective:  COVID screening questions were asked and patient attested that there had been no contact or symptoms    At arrival mild antalgic gait with some L TKE avoidance  Report 8/10 pain with QS and SLR with min or maximal muscle engagement. Tactile cues for proper positioning of leg during S/L hip abduction in order to achieve greater isolation of glut med.      Exercises: (No more than 4 columns)   Exercise/Equipment 5/3/22  #1 5/11/2022 #2 5/20/22 #3           WARM UP      Nu step   L2 5' L4 5'         TABLE      *Quad set with towel  10x2  3\" 20* 10x2  3\"   *SLR 10x2 20* 10x2   LAQ  3# 2*10 ecc focus     Bridge with SB  20* 20*   Resisted hamstring curl   BTB 20* BTB 20*   SL Hip ABD  20* 20*                        STANDING  NEXT (patient was only in a 30' spot today)    *TKE GTB 10x2  FM 7# x 20   *Knee flexed marches  20x2  20x2   Shuttle leg press    X 20 L LE 1c   FM bed stepping    17# x 10   Added hip machine                       PROPRIOCEPTION                                    MODALITIES                      Other Therapeutic Activities/Education: none       Home Exercise Program:  Continue with current   5/20 HR    Manual Treatments: x       Modalities:  Ice cup to L patellar tendon in supine with towel support x5' for pain management. Communication with other providers:  POC sent 5/3/22        Assessment: Pt demonstrated good tolerance to tx with same increase in pain with OKC and CKC. Did well with adde strengthening exercises. Added HR and ice massage to HEP. Pt would continue to benefit from skilled therapy interventions to address remaining impairments, improve mobility and strength and progress toward goal completion while reducing risk for re-injury or further decline. End session pain: 0/10 at rest. 7/10 at [facility] for Next Session:  review HEP, open chain targeting L quad as tolerated, L hip in all directions strengthening, L knee terminal knee ext ROM as tolerated, manual cross friction/ice cup/graston to patellar tendon, modalities PRN.     Time In / Time Out:    2556/5514       Timed Code/Total Treatment Minutes:  40'/45' 3TE    Next Progress Note due:  rosaal 5/3/22  Visit 10       Plan of Care Interventions:  [x] Therapeutic Exercise  [x] Modalities:  [x] Therapeutic Activity     [] Ultrasound  [] Estim  [x] Gait Training      [] Cervical Traction [] Lumbar Traction  [x] Neuromuscular Re-education    [x] Cold/hotpack [] Iontophoresis   [x] Instruction in HEP      [x] Vasopneumatic   [] Dry Needling    [x] Manual Therapy               [] Aquatic Therapy              Electronically signed by:  Bishop Gregory PTA, CLT 5/20/2022 8:50 AM

## 2022-05-23 ENCOUNTER — HOSPITAL ENCOUNTER (OUTPATIENT)
Dept: PHYSICAL THERAPY | Age: 27
Setting detail: THERAPIES SERIES
Discharge: HOME | End: 2022-05-23
Payer: COMMERCIAL

## 2022-05-23 PROCEDURE — 97110 THERAPEUTIC EXERCISES: CPT

## 2022-05-23 NOTE — FLOWSHEET NOTE
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 Short term goals  Time Frame for Short term goals: Defer to LTG. Long Term Goals  Time Frame for Long Term Goals: 6 weeks 22  Pt will demo I with HEP/symptom management. Met - reports compliance   Pt will demo able to extend knee in supine and standing without increase in pain to ease gait mechanics  Pt will demo 5/5 knee flex/ext strength with >5# improvement per Microfit to ease transfers. Pt will demo KOOS <10/28 disability to demo improved function. Pt will demo sit to stand transfer and squat without increase in pain to ease ADLs. Summary of Evaluation:    Pt is 27year oldfe male with 2 month gradual onset of L>R knee pain with pain and crepitus. Pt now has difficulties completing ADLs and caring for children. Pt demo deficits this date that include reduced B LE strength, inability to extend L knee, min gait deficits and pain. Testing this date indicate signs and symptoms of patellar tendonitis/patellofemoral pain syndrome based on locality of pain.  Pt will benefit with PT services with progression of strength/ROM, manual and modalities to return to PLOF. Pt prior to onset of current condition had no pain with able to complete full ADLs and homecare activities. Patient received education on their current pathology and how their condition effects them with their functional activities. Patient understood discussion and questions were answered. Patient understands their activity limitations and understands rational for treatment progression. Subjective:  Patient reports pain 5/10 patellar tendon and up lateral and medial knee when knee is straight. HEP compliant. Reports the QS and the SLR hurt the most. Pain started 2 months ago . She had a baby 9 months ago. Any changes in Ambulatory Summary Sheet?   None        Objective:  COVID screening questions were asked and patient attested that there had been no contact or symptoms    At arrival mild antalgic gait with some L TKE avoidance   Increase pain 5/10 with TKE     Exercises: (No more than 4 columns)   Exercise/Equipment 5/3/22  #1 5/11/2022 #2 5/20/22 #3 5/23/22 #4            WARM UP       Nu step   L2 5' L4 5' L5 5' no UE          TABLE       *Quad set with towel  10x2  3\" 20* 10x2  3\"    *SLR 10x2 20* 10x2    LAQ  3# 2*10 ecc focus      Bridge with SB  20* 20*    Resisted hamstring curl   BTB 20* BTB 20*    SL Hip ABD  20* 20*                            STANDING  NEXT (patient was only in a 30' spot today)     *TKE GTB 10x2  FM 7# x 20 FM 7# x 20   *Knee flexed marches  20x2  20x2 -   Shuttle leg press      X 20 L LE 1c X 10 L LE 2c   FM bed stepping    17# x 10 17# x 10   Added hip machine Abd/add/ flex    Flex X 15 25# (x10 next)  Abd x10 12.5#  Add x 10 12.5#   HR    X 20               PROPRIOCEPTION       SLS    30 s x 2 no UE                               MODALITIES                         Other Therapeutic Activities/Education: none       Home Exercise Program:  Continue with current   5/20 HR    Manual Treatments: x       Modalities:  Ice cup to L patellar tendon in supine with towel support x5' for pain management. Communication with other providers:  POC sent 5/3/22        Assessment: Pt demonstrated good tolerance to tx with added exercises. Demonstrated rapid hip flexor fatigue. Added HR and ice massage to HEP. Improved pain at TKE vs last tx. Pt would continue to benefit from skilled therapy interventions to address remaining impairments, improve mobility and strength and progress toward goal completion while reducing risk for re-injury or further decline. End session pain: 0/10 at rest. 5/10 at [facility] for Next Session:  review HEP, open chain targeting L quad as tolerated, L hip in all directions strengthening, L knee terminal knee ext ROM as tolerated, manual cross friction/ice cup/graston to patellar tendon, modalities PRN.     Time In / Time Out:   4627/0346      Timed Code/Total Treatment Minutes:  42'/47' 3TE    Next Progress Note due:  eval 5/3/22  Visit 10       Plan of Care Interventions:  [x] Therapeutic Exercise  [x] Modalities:  [x] Therapeutic Activity     [] Ultrasound  [] Estim  [x] Gait Training      [] Cervical Traction [] Lumbar Traction  [x] Neuromuscular Re-education    [x] Cold/hotpack [] Iontophoresis   [x] Instruction in HEP      [x] Vasopneumatic   [] Dry Needling    [x] Manual Therapy               [] Aquatic Therapy              Electronically signed by:  Roberta Albert PTA, CLT 5/23/2022 9:04 AM

## 2022-05-25 ENCOUNTER — HOSPITAL ENCOUNTER (OUTPATIENT)
Dept: PHYSICAL THERAPY | Age: 27
Setting detail: THERAPIES SERIES
Discharge: HOME | End: 2022-05-25
Payer: COMMERCIAL

## 2022-05-25 PROCEDURE — 97110 THERAPEUTIC EXERCISES: CPT

## 2022-05-25 PROCEDURE — 97140 MANUAL THERAPY 1/> REGIONS: CPT

## 2022-05-25 NOTE — FLOWSHEET NOTE
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 Short term goals  Time Frame for Short term goals: Defer to LTG. Long Term Goals  Time Frame for Long Term Goals: 6 weeks 22  Pt will demo I with HEP/symptom management. Met - reports compliance   Pt will demo able to extend knee in supine and standing without increase in pain to ease gait mechanics  Pt will demo 5/5 knee flex/ext strength with >5# improvement per Microfit to ease transfers. Pt will demo KOOS <10/28 disability to demo improved function. Pt will demo sit to stand transfer and squat without increase in pain to ease ADLs. Summary of Evaluation:    Pt is 27year oldfe male with 2 month gradual onset of L>R knee pain with pain and crepitus. Pt now has difficulties completing ADLs and caring for children. Pt demo deficits this date that include reduced B LE strength, inability to extend L knee, min gait deficits and pain. Testing this date indicate signs and symptoms of patellar tendonitis/patellofemoral pain syndrome based on locality of pain.  Pt will benefit with PT services with progression of strength/ROM, manual and modalities to return to PLOF. Pt prior to onset of current condition had no pain with able to complete full ADLs and homecare activities. Patient received education on their current pathology and how their condition effects them with their functional activities. Patient understood discussion and questions were answered. Patient understands their activity limitations and understands rational for treatment progression. Subjective:  Patient reports pain 5/10 patellar tendon and up lateral and medial knee when knee is straight. No change in pain so far. Any changes in Ambulatory Summary Sheet?   None        Objective:  COVID screening questions were asked and patient attested that there had been no contact or symptoms    At arrival mild antalgic gait with some L TKE avoidance   Increase pain 5/10 with TKE  Increased discomfort with IASTM around patellar tendon and medial knee    Exercises: (No more than 4 columns)   Exercise/Equipment 5/3/22  #1 5/11/2022 #2 5/20/22 #3 5/23/22 #4 5/25/22 #5             WARM UP        Nu step   L2 5' L4 5' L5 5' no UE L6 5' no UE           TABLE        *Quad set with towel  10x2  3\" 20* 10x2  3\"     *SLR 10x2 20* 10x2     LAQ  3# 2*10 ecc focus       Bridge with SB  20* 20*     Resisted hamstring curl   BTB 20* BTB 20*     SL Hip ABD  20* 20*                                STANDING  NEXT (patient was only in a 30' spot today)      *TKE GTB 10x2  FM 7# x 20 FM 7# x 20 FM 10# x 20   *Knee flexed marches  20x2  20x2 -    Shuttle leg press      X 20 L LE 1c X 10 L LE 2c X 20 L LE 2c   FM bed stepping    17# x 10 17# x 10 20 # x 10   Added hip machine Abd/add/ flex    Flex X 15 25# (x10 next)  Abd x10 12.5#  Add x 10 12.5# Flex x10 25#  Add x 10 25#  Abd x10 25#     HR    X 20 x25                PROPRIOCEPTION        SLS    30 s x 2 no UE                                    MODALITIES                            Other Therapeutic Activities/Education: none Home Exercise Program:  Continue with current   5/20 HR    Manual Treatments: IASTM to patellar tendon and medial knee       Modalities:        Communication with other providers:  POC sent 5/3/22        Assessment: Pt demonstrated good tolerance to tx with increased resistance today. No change in pain with exercises today. Some minimal swellingpresent around knee. Pt would continue to benefit from skilled therapy interventions to address remaining impairments, improve mobility and strength and progress toward goal completion while reducing risk for re-injury or further decline. End session pain: 0/10 at rest. 5/10 at [address] [facility] for Next Session:  review HEP, open chain targeting L quad as tolerated, L hip in all directions strengthening, L knee terminal knee ext ROM as tolerated, manual cross friction/ice cup/graston to patellar tendon, modalities PRN.     Time In / Time Out:  0845/0930      Timed Code/Total Treatment Minutes:  45'/45' 2TE 1MT    Next Progress Note due:  rosaal 5/3/22  Visit 10       Plan of Care Interventions:  [x] Therapeutic Exercise  [x] Modalities:  [x] Therapeutic Activity     [] Ultrasound  [] Estim  [x] Gait Training      [] Cervical Traction [] Lumbar Traction  [x] Neuromuscular Re-education    [x] Cold/hotpack [] Iontophoresis   [x] Instruction in HEP      [x] Vasopneumatic   [] Dry Needling    [x] Manual Therapy               [] Aquatic Therapy              Electronically signed by:  Ken Estrada PTA, CLT 5/25/2022 8:46 AM

## 2022-05-31 ENCOUNTER — HOSPITAL ENCOUNTER (OUTPATIENT)
Dept: PHYSICAL THERAPY | Age: 27
Setting detail: THERAPIES SERIES
Discharge: HOME | End: 2022-05-31
Payer: COMMERCIAL

## 2022-05-31 PROCEDURE — 97110 THERAPEUTIC EXERCISES: CPT

## 2022-05-31 NOTE — FLOWSHEET NOTE
Chronic- Stable. Cont meds per PCP and other physicians. 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 Short term goals  Time Frame for Short term goals: Defer to LTG. Long Term Goals  Time Frame for Long Term Goals: 6 weeks 22  Pt will demo I with HEP/symptom management. Met - reports compliance   Pt will demo able to extend knee in supine and standing without increase in pain to ease gait mechanics  Pt will demo 5/5 knee flex/ext strength with >5# improvement per Microfit to ease transfers. Pt will demo KOOS <10/28 disability to demo improved function. Pt will demo sit to stand transfer and squat without increase in pain to ease ADLs. Summary of Evaluation:    Pt is 27year oldfe male with 2 month gradual onset of L>R knee pain with pain and crepitus. Pt now has difficulties completing ADLs and caring for children. Pt demo deficits this date that include reduced B LE strength, inability to extend L knee, min gait deficits and pain. Testing this date indicate signs and symptoms of patellar tendonitis/patellofemoral pain syndrome based on locality of pain.  Pt will benefit with PT services with progression of strength/ROM, manual and modalities to return to PLOF. Pt prior to onset of current condition had no pain with able to complete full ADLs and homecare activities. Patient received education on their current pathology and how their condition effects them with their functional activities. Patient understood discussion and questions were answered. Patient understands their activity limitations and understands rational for treatment progression. Subjective:  Patient reports pain 5/10 patellar tendon and up lateral and medial knee when knee is straight. No change in pain so far. No issues with walking. Pain with 2/10 with flight of stairs. Any changes in Ambulatory Summary Sheet?   None        Objective:  COVID screening questions were asked and patient attested that there had been no contact or symptoms      At arrival very mild antalgic gait with lack of TKE  Increase pain with quad set, demo full TKE passively and full knee flexion  Unable to complete full TKE with SAQ without crepitus  Improved quad set pain with medial patellar glide      Exercises: (No more than 4 columns)   Exercise/Equipment 5/23/22 #4 5/25/22 #5 5/31/22  #6           WARM UP      Nu step  L5 5' no UE L6 5' no UE    R bike    5'    TABLE      *Quad set with towel    No towel, medial knee push 10x2  2\"   *SLR   10x  * pain    LAQ   3# 10x2   Bridge with SB   Black pad 10x2   Resisted hamstring curl    Stool drag 10x2  L LE   SL Hip ABD      Heel prop   No issues    SAQ   3# 10x2, unable to reach full TKE                                    STANDING      *TKE FM 7# x 20 FM 10# x 20    *Knee flexed marches  -     Shuttle leg press    X 10 L LE 2c X 20 L LE 2c L LE 2c 10x2   FM bwd stepping  17# x 10 20 # x 10    Added hip machine Abd/add/ flex Flex X 15 25# (x10 next)  Abd x10 12.5#  Add x 10 12.5# Flex x10 25#  Add x 10 25#  Abd x10 25#      HR X 20 x25         Lateral step up   4in 10x2   PROPRIOCEPTION      SLS 30 s x 2 no UE  BOSU 20\"x4 MODALITIES                      Other Therapeutic Activities/Education: none       Home Exercise Program:  Continue with current   5/20 HR    Manual Treatments:      Modalities:        Communication with other providers:  POC sent 5/3/22        Assessment: Pt demonstrated good tolerance to tx. Full knee AR OM into TKE and full knee flexion with patellar pain with quad set with improvement with medial knee pull. Does demo weakness with pain with SLR and lateral step up >4in. No change in therapy at this time with terminal knee pain but remains functional. Will finish POC with possible return follow up to referring physician. Pt would continue to benefit from skilled therapy interventions to address remaining impairments, improve mobility and strength and progress toward goal completion while reducing risk for re-injury or further decline. End session pain: 0/10 at rest. 5/10 at active TKE       Plan for Next Session:    open chain targeting L quad as tolerated, L hip in all directions strengthening, L knee terminal knee ext ROM as tolerated, manual cross friction/ice cup/graston to patellar tendon, modalities PRN.     Time In / Time Out:  0398-4806      Timed Code/Total Treatment Minutes:  40/40'         40' TE    Next Progress Note due:  eval 5/3/22  Visit 10       Plan of Care Interventions:  [x] Therapeutic Exercise  [x] Modalities:  [x] Therapeutic Activity     [] Ultrasound  [] Estim  [x] Gait Training      [] Cervical Traction [] Lumbar Traction  [x] Neuromuscular Re-education    [x] Cold/hotpack [] Iontophoresis   [x] Instruction in HEP      [x] Vasopneumatic   [] Dry Needling    [x] Manual Therapy               [] Aquatic Therapy              Electronically signed by:  Lewis Simpson PT, DPT, OCS    5/31/2022 7:45 AM

## 2022-06-02 ENCOUNTER — HOSPITAL ENCOUNTER (OUTPATIENT)
Dept: PHYSICAL THERAPY | Age: 27
Setting detail: THERAPIES SERIES
Discharge: HOME | End: 2022-06-02
Payer: COMMERCIAL

## 2022-06-02 PROCEDURE — 97110 THERAPEUTIC EXERCISES: CPT

## 2022-06-02 NOTE — FLOWSHEET NOTE
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 Short term goals  Time Frame for Short term goals: Defer to LTG. Long Term Goals  Time Frame for Long Term Goals: 6 weeks 22  Pt will demo I with HEP/symptom management. Met - reports compliance   Pt will demo able to extend knee in supine and standing without increase in pain to ease gait mechanics  Pt will demo 5/5 knee flex/ext strength with >5# improvement per [employer] to ease transfers. Pt will demo KOOS <10/28 disability to demo improved function. Pt will demo sit to stand transfer and squat without increase in pain to ease ADLs. Summary of Evaluation:    Pt is 27year oldfe male with 2 month gradual onset of L>R knee pain with pain and crepitus. Pt now has difficulties completing ADLs and caring for children. Pt demo deficits this date that include reduced B LE strength, inability to extend L knee, min gait deficits and pain. Testing this date indicate signs and symptoms of patellar tendonitis/patellofemoral pain syndrome based on locality of pain.  Pt will benefit with PT services with progression of strength/ROM, manual and modalities to return to PLOF. Pt prior to onset of current condition had no pain with able to complete full ADLs and homecare activities. Patient received education on their current pathology and how their condition effects them with their functional activities. Patient understood discussion and questions were answered. Patient understands their activity limitations and understands rational for treatment progression. Subjective:  Patient reports pain is the same. Going down the stairs hurt a little today. Any changes in Ambulatory Summary Sheet?   None      Objective:  COVID screening questions were asked and patient attested that there had been no contact or symptoms      At arrival very mild antalgic gait with lack of TKE  Increase pain with quad set, demo full TKE passively and full knee flexion  Unable to complete full TKE with SAQ without pain    Avoids TKE with LAQ    Exercises: (No more than 4 columns)   Exercise/Equipment 5/23/22 #4 5/25/22 #5 5/31/22  #6 6/2/22 #7            WARM UP       Nu step  L5 5' no UE L6 5' no UE     R bike    5'  5' lv3   TABLE       *Quad set with towel    No towel, medial knee push 10x2  2\"    *SLR   10x  * pain  2 x 10  * pain    LAQ   3# 10x2 3# 10x2, unable to reach full TKE   Bridge with SB   Black pad 10x2    Resisted hamstring curl    Stool drag 10x2  L LE Stool drag 10x2  L LE   SL Hip ABD       Heel prop   No issues     SAQ   3# 10x2, unable to reach full TKE 3# 10x2, unable to reach full TKE   HS curl machine    x 15 10#                                  STANDING       *TKE FM 7# x 20 FM 10# x 20  FM 10# x 20   *Knee flexed marches  -      Shuttle leg press    X 10 L LE 2c X 20 L LE 2c L LE 2c 10x2 L LE 2c 10x2   FM bwd stepping  17# x 10 20 # x 10     Added hip machine Abd/add/ flex Flex X 15 25# (x10 next)  Abd x10 12.5#  Add x 10 12.5# Flex x10 25#  Add x 10 25#  Abd x10 25#    Flex x10 25#  Add x 10 25#  Abd x10 25#   HR X 20 x25  X 30   Lateral step up   4in 10x2 4in 10x2   PROPRIOCEPTION       SLS 30 s x 2 no UE  BOSU 20\"x4 BOSU 20\"x4                               MODALITIES                         Other Therapeutic Activities/Education: none       Home Exercise Program:  Continue with current   5/20 HR    Manual Treatments:      Modalities:        Communication with other providers:  POC sent 5/3/22        Assessment: Pt demonstrated good tolerance to tx. No change in pain and overall HS/quad muscle fatigue. HS weakness noted with new exercise. Pt would continue to benefit from skilled therapy interventions to address remaining impairments, improve mobility and strength and progress toward goal completion while reducing risk for re-injury or further decline. End session pain: 0/10 at rest. 5/10 at active TKE moderate muscle fatigue      Plan for Next Session:    open chain targeting L quad as tolerated, L hip in all directions strengthening, L knee terminal knee ext ROM as tolerated, manual cross friction/ice cup/graston to patellar tendon, modalities PRN.     Time In / Time Out:  2385-0050      Timed Code/Total Treatment Minutes:  38/38'     3 TE    Next Progress Note due:  eval 5/3/22  Visit 10       Plan of Care Interventions:  [x] Therapeutic Exercise  [x] Modalities:  [x] Therapeutic Activity     [] Ultrasound  [] Estim  [x] Gait Training      [] Cervical Traction [] Lumbar Traction  [x] Neuromuscular Re-education    [x] Cold/hotpack [] Iontophoresis   [x] Instruction in HEP      [x] Vasopneumatic   [] Dry Needling    [x] Manual Therapy               [] Aquatic Therapy              Electronically signed by:  Hanna Stewart PTA, CLT 6/2/2022 10:18 AM

## 2022-06-07 ENCOUNTER — HOSPITAL ENCOUNTER (OUTPATIENT)
Dept: PHYSICAL THERAPY | Age: 27
Setting detail: THERAPIES SERIES
Discharge: HOME | End: 2022-06-07
Payer: COMMERCIAL

## 2022-06-07 PROCEDURE — 97035 APP MDLTY 1+ULTRASOUND EA 15: CPT

## 2022-06-07 PROCEDURE — 97110 THERAPEUTIC EXERCISES: CPT

## 2022-06-07 PROCEDURE — 97530 THERAPEUTIC ACTIVITIES: CPT

## 2022-06-07 NOTE — PROGRESS NOTES
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 Interested in getting injection and MRI of knee. No issues with longer distance walking. Remains with same intensity of pain with TKE with quad activation. Plan of Care/Treatment to date:  [x] Therapeutic Exercise    [x] Modalities:  [x] Therapeutic Activity     [x] Ultrasound  [] Electrical Stimulation  [x] Gait Training      [] Cervical Traction   [] Lumbar Traction  [x] Neuromuscular Re-education  [x] Cold/hotpack [] Iontophoresis  [x] Instruction in HEP      Other:  [x] Manual Therapy       [x]  Vasopneumatic  [] Aquatic Therapy       []   Dry Needle Therapy                      Objective/Significant Findings At Last Visit/Comments:       Unable to toe/heel walk. At arrival very mild antalgic gait with lack of TKE  Full squat with pain through entire range to TKE  Increase pain with quad set, demo full TKE passively and full knee flexion  Unable to complete full TKE with short arc quad or long arc quad.   0 deg knee ext passively with immediate pain with activation  to full knee flexion 155 deg   L SLS: >30 sec with no increase in pain. 30 sec sit to stand: 12 reps without increase in pain.    L Knee Flexion: 5/5 (Micro[employer]: 21#)  L Knee Extension: 5/5 ([employer]: 35#)  KOOS: 12/28 disability      Assessment:   Pt has completed 8 visits since start of therapy on 5/3/22. Pt remains with pain mostly knee extended with active quad activation but does have pain with closed chain activities as well. Demo full passive knee flex/Ext and normal patellar glides with min change with alteration in patellar position with quad activation. Pain has remained with knee ext with quad activation. Demo objective improvement in strength and remains functional with ongoing terminal knee pain actively. Anticipate return follow up to Ortho to discuss future management. Pt agrees to this plan. Goal Status:  [] Achieved [x] Partially Achieved  [] Not Achieved     Patient goals: improve pain to ease ADLs. Short term goals  Time Frame for Short term goals: Defer to LTG. Long Term Goals  Time Frame for Long Term Goals: 6 weeks 6/17/22  Pt will demo I with HEP/symptom management. Met - reports compliance   Pt will demo able to extend knee in supine and standing without increase in pain to ease gait mechanics Partially MET   Pt will demo 5/5 knee flex/ext strength with >5# improvement per [employer] to ease transfers. MET  Pt will demo KOOS <10/28 disability to demo improved function. Partially MET   Pt will demo sit to stand transfer and squat without increase in pain to ease ADLs. Partially MET                 Patient Status: [x] Continue per initial plan of Care     [] Patient now discharged     [] Additional visits requested, Please re-certify for additional visits: If we are requesting more visits, we fully anticipate the patient's condition is expected to improve within the treatment timeframe we are requesting. Electronically signed by:  Francisco Murphy PT, DPT, OCS  6/7/2022, 7:51 AM    6/7/2022 5:47 PM     If you have any questions or concerns, please don't hesitate to call.   Thank you for your referral.    Physician Signature:______________________ Date:______ Time: ________  By signing above, therapists plan is approved by physician

## 2022-06-07 NOTE — FLOWSHEET NOTE
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 Short term goals  Time Frame for Short term goals: Defer to LTG. Long Term Goals  Time Frame for Long Term Goals: 6 weeks 22  Pt will demo I with HEP/symptom management. Met - reports compliance   Pt will demo able to extend knee in supine and standing without increase in pain to ease gait mechanics Partially MET   Pt will demo 5/5 knee flex/ext strength with >5# improvement per [employer] to ease transfers. MET  Pt will demo KOOS <10/28 disability to demo improved function. Partially MET   Pt will demo sit to stand transfer and squat without increase in pain to ease ADLs. Partially MET       Summary of Evaluation:    Pt is 27year oldfe male with 2 month gradual onset of L>R knee pain with pain and crepitus. Pt now has difficulties completing ADLs and caring for children. Pt demo deficits this date that include reduced B LE strength, inability to extend L knee, min gait deficits and pain. Testing this date indicate signs and symptoms of patellar tendonitis/patellofemoral pain syndrome based on locality of pain.  Pt will benefit with PT services with progression of strength/ROM, manual and modalities to return to PLOF. Pt prior to onset of current condition had no pain with able to complete full ADLs and homecare activities. Patient received education on their current pathology and how their condition effects them with their functional activities. Patient understood discussion and questions were answered. Patient understands their activity limitations and understands rational for treatment progression. Subjective:  Patient reports pain is the same with no pain with pain with stairs, running, kneeling, squatting or sitting with legs crossed. Interested in getting injection and MRI of knee. No issues with longer distance walking. Remains with same intensity of pain with TKE with quad activation. Any changes in Ambulatory Summary Sheet? None      Objective:  COVID screening questions were asked and patient attested that there had been no contact or symptoms    Unable to toe/heel walk. At arrival very mild antalgic gait with lack of TKE  Full squat with pain through entire range to TKE  Increase pain with quad set, demo full TKE passively and full knee flexion  Unable to complete full TKE with short arc quad or long arc quad.   0 deg knee ext passively with immediate pain with activation  to full knee flexion 155 deg   L SLS: >30 sec with no increase in pain. 30 sec sit to stand: 12 reps without increase in pain.    L Knee Flexion: 5/5 ([employer]: 21#)  L Knee Extension: 5/5 ([employer]: 35#)  KOOS: 12/28 disability      Exercises: (No more than 4 columns)   Exercise/Equipment 5/23/22 #4 5/25/22 #5 5/31/22  #6 6/2/22 #7 6/7/22  #8             WARM UP        Nu step  L5 5' no UE L6 5' no UE   Lv5  5'    R bike    5'  5' lv3    TABLE        *Quad set with towel    No towel, medial knee push 10x2  2\"     *SLR   10x  * pain  2 x 10  * pain     LAQ   3# 10x2 3# 10x2, unable to reach full TKE    Bridge with SB   Black pad 10x2     Resisted hamstring curl    Stool drag 10x2  L LE Stool drag 10x2  L LE    SL Hip ABD        Heel prop   No issues      SAQ   3# 10x2, unable to reach full TKE 3# 10x2, unable to reach full TKE    HS curl machine    x 15 10#                                       STANDING        *TKE FM 7# x 20 FM 10# x 20  FM 10# x 20 FM 17#   x20   2\"   *Knee flexed marches  -       Shuttle leg press    X 10 L LE 2c X 20 L LE 2c L LE 2c 10x2 L LE 2c 10x2    FM bwd stepping  17# x 10 20 # x 10      Added hip machine Abd/add/ flex Flex X 15 25# (x10 next)  Abd x10 12.5#  Add x 10 12.5# Flex x10 25#  Add x 10 25#  Abd x10 25#    Flex x10 25#  Add x 10 25#  Abd x10 25#    HR X 20 x25  X 30    Lateral step up   4in 10x2 4in 10x2    PROPRIOCEPTION        SLS 30 s x 2 no UE  BOSU 20\"x4 BOSU 20\"x4                                    MODALITIES                            Other Therapeutic Activities/Education: none       Home Exercise Program:  Continue with current   5/20 HR    Manual Treatments:      Modalities:        Ultrasound, 5cm2, 1 MHZ, 1.0 cm2 x7 min to L patellar tendon region in slight flexion. Min discomfort at end of time. Communication with other providers:  POC sent 5/3/22    PN completed 6/7/22       Assessment: Pt has completed 8 visits since start of therapy on 5/3/22. Pt remains with pain mostly knee extended with active quad activation but does have pain with closed chain activities as well. Demo full passive knee flex/Ext and normal patellar glides with min change with alteration in patellar position with quad activation. Pain has remained with knee ext with quad activation. Demo objective improvement in strength and remains functional with ongoing terminal knee pain actively. Anticipate return follow up to Ortho to discuss future management. Pt agrees to this plan.        End session pain: 0/10 at rest. 5/10 at active TKE moderate muscle fatigue      Plan for Next Session:    open chain targeting L quad as tolerated, L hip in all directions strengthening, L knee terminal knee ext ROM as tolerated, manual cross friction/ice cup/graston to patellar tendon, modalities PRN.     Time In / Time Out:  4097-9359       Timed Code/Total Treatment Minutes:  38/38'     18' TE, 10' TA,  10' ultrasound including set up    Next Progress Note due:  pete 5/3/22  Visit 10       Plan of Care Interventions:  [x] Therapeutic Exercise  [x] Modalities:  [x] Therapeutic Activity     [] Ultrasound  [] Estim  [x] Gait Training      [] Cervical Traction [] Lumbar Traction  [x] Neuromuscular Re-education    [x] Cold/hotpack [] Iontophoresis   [x] Instruction in HEP      [x] Vasopneumatic   [] Dry Needling    [x] Manual Therapy               [] Aquatic Therapy              Electronically signed by:  Alison Ayala PT, CLT 6/7/2022 7:51 AM

## 2022-06-09 ENCOUNTER — HOSPITAL ENCOUNTER (OUTPATIENT)
Dept: PHYSICAL THERAPY | Age: 27
Setting detail: THERAPIES SERIES
Discharge: HOME | End: 2022-06-09
Payer: COMMERCIAL

## 2022-06-09 PROCEDURE — 97110 THERAPEUTIC EXERCISES: CPT

## 2022-06-09 NOTE — FLOWSHEET NOTE
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 Short term goals  Time Frame for Short term goals: Defer to LTG. Long Term Goals  Time Frame for Long Term Goals: 6 weeks 22  Pt will demo I with HEP/symptom management. Met - reports compliance   Pt will demo able to extend knee in supine and standing without increase in pain to ease gait mechanics Partially MET   Pt will demo 5/5 knee flex/ext strength with >5# improvement per [employer] to ease transfers. MET  Pt will demo KOOS <10/28 disability to demo improved function. Partially MET   Pt will demo sit to stand transfer and squat without increase in pain to ease ADLs. Partially MET       Summary of Evaluation:    Pt is 27year oldfe male with 2 month gradual onset of L>R knee pain with pain and crepitus. Pt now has difficulties completing ADLs and caring for children. Pt demo deficits this date that include reduced B LE strength, inability to extend L knee, min gait deficits and pain. Testing this date indicate signs and symptoms of patellar tendonitis/patellofemoral pain syndrome based on locality of pain.  Pt will benefit with PT services with progression of strength/ROM, manual and modalities to return to PLOF. Pt prior to onset of current condition had no pain with able to complete full ADLs and homecare activities. Patient received education on their current pathology and how their condition effects them with their functional activities. Patient understood discussion and questions were answered. Patient understands their activity limitations and understands rational for treatment progression. Subjective:  Sanjeev Mark states that the pain is a stabbing feeling. The pain increases more when her leg is fully extended. She sates that the knee was hurting all day yesterday. Any changes in Ambulatory Summary Sheet? None      Objective:  COVID screening questions were asked and patient attested that there had been no contact or symptoms    She worked through her exercises without any rest breaks. Still unable to perform open chain quad exercises to terminal extension without increased pain.       Exercises: (No more than 4 columns)   Exercise/Equipment 6/2/22 #7 6/7/22  #8 6/9/2022 #9           WARM UP      Nu step   Lv5  5'  Lv5  5'    R bike  5' lv3           TABLE      *SLR 2 x 10  * pain      LAQ 3# 10x2, unable to reach full TKE  3# 10x2   Resisted hamstring curl  Stool drag 10x2  L LE  Stool drag 10x2  L LE   SAQ 3# 10x2, unable to reach full TKE  3# 10x2,   HS curl machine x 15 10#  x 15 10#                              STANDING      *TKE FM 10# x 20 FM 17#   x20   2\" FM 10# x 20   Shuttle leg press    L LE 2c 10x2  L LE 2c 10x2   Added hip machine Abd/add/ flex Flex x10 25#  Add x 10 25#  Abd x10 25#  Flex x10 25#  Add x 10 25#  Abd x10 25#   HR X 30  X 30   Lateral step up 4in 10x2  4in 10x2 heel tap         PROPRIOCEPTION      SLS BOSU 20\"x4                             MODALITIES      Ice massage   5'             Other Therapeutic Activities/Education: none       Home Exercise Program:  Continue with current   5/20 HR    Manual Treatments:      Modalities:  Ice cup to L patellar tendon in supine with towel support x5' for pain management. Communication with other providers:  POC sent 5/3/22    PN completed 6/7/22       Assessment: Aida Schwartz is doing well overall. Pain remains with terminal knee extension and has not changed since the start of PT. End session pain: same       Plan for Next Session:    open chain targeting L quad as tolerated, L hip in all directions strengthening, L knee terminal knee ext ROM as tolerated, manual cross friction/ice cup/graston to patellar tendon, modalities PRN.     Time In / Time Out:   1345/1420      Timed Code/Total Treatment Minutes:  28' 2 TE    Next Progress Note due:  eval 5/3/22  Visit 10       Plan of Care Interventions:  [x] Therapeutic Exercise  [x] Modalities:  [x] Therapeutic Activity     [] Ultrasound  [] Estim  [x] Gait Training      [] Cervical Traction [] Lumbar Traction  [x] Neuromuscular Re-education    [x] Cold/hotpack [] Iontophoresis   [x] Instruction in HEP      [x] Vasopneumatic   [] Dry Needling    [x] Manual Therapy               [] Aquatic Therapy              Electronically signed by:  Sujit Rivero PTA, assisted by NANCY Farmer      6/9/2022 9:21 AM

## 2022-06-13 ENCOUNTER — OFFICE VISIT (OUTPATIENT)
Dept: ORTHOPEDIC SURGERY | Age: 27
End: 2022-06-13
Payer: COMMERCIAL

## 2022-06-13 VITALS
HEART RATE: 62 BPM | HEIGHT: 63 IN | SYSTOLIC BLOOD PRESSURE: 103 MMHG | RESPIRATION RATE: 16 BRPM | OXYGEN SATURATION: 98 % | DIASTOLIC BLOOD PRESSURE: 56 MMHG | WEIGHT: 131 LBS | BODY MASS INDEX: 23.21 KG/M2

## 2022-06-13 DIAGNOSIS — M22.8X2 PATELLAR MALTRACKING, LEFT: Primary | ICD-10-CM

## 2022-06-13 PROCEDURE — G8420 CALC BMI NORM PARAMETERS: HCPCS | Performed by: PHYSICIAN ASSISTANT

## 2022-06-13 PROCEDURE — 1036F TOBACCO NON-USER: CPT | Performed by: PHYSICIAN ASSISTANT

## 2022-06-13 PROCEDURE — G8427 DOCREV CUR MEDS BY ELIG CLIN: HCPCS | Performed by: PHYSICIAN ASSISTANT

## 2022-06-13 PROCEDURE — 99212 OFFICE O/P EST SF 10 MIN: CPT | Performed by: PHYSICIAN ASSISTANT

## 2022-06-13 RX ORDER — DROSPIRENONE 4 MG/1
TABLET, FILM COATED ORAL
COMMUNITY
Start: 2022-05-11

## 2022-06-13 ASSESSMENT — ENCOUNTER SYMPTOMS
GASTROINTESTINAL NEGATIVE: 1
EYES NEGATIVE: 1
RESPIRATORY NEGATIVE: 1

## 2022-06-13 NOTE — PROGRESS NOTES
Patient is in the office after doing 9 session. The patient states that she is still having lots of issues. She states once she gets her quad firing she notices that she has more pain.

## 2022-06-13 NOTE — PROGRESS NOTES
Review of Systems   Constitutional: Negative. HENT: Negative. Eyes: Negative. Respiratory: Negative. Cardiovascular: Negative. Gastrointestinal: Negative. Genitourinary: Negative. Musculoskeletal: Positive for arthralgias and myalgias. Skin: Negative. Neurological: Negative. Psychiatric/Behavioral: Negative. HPI:  Frederick Sever is a 27 y.o. female that returns to the office today after doing 9 sessions of physical therapy for her left knee. She states that she continues to have left knee pain along the patellar tendon and behind that. She states that when she goes from a sitting to a standing position and activates her quad that is when she really feels the pain. She feels like it has not gotten any better. She would like to discuss MRI of the left knee. The patient was referred by No ref. provider found   for Left knee pain.     Past Medical History:   Diagnosis Date    H/O cardiovascular stress test 02/26/2021    normal stress    H/O echocardiogram 02/23/2021    EF 55-60% no evidence of pericardial effusion no significant valvular abnormalities       Past Surgical History:   Procedure Laterality Date    APPENDECTOMY  08/2017    LYMPHADENECTOMY      TONSILLECTOMY         Family History   Problem Relation Age of Onset    Heart Disease Mother         pacemaker   Malik Migraines Mother     Coronary Art Dis Other         GRANDFATHER AND GREAT GRANDFATHER (NOT SPECIFIED)    Diabetes Other         GRANDMOTHER - NON INSULIN DEPENDNET       Social History     Socioeconomic History    Marital status: Single     Spouse name: None    Number of children: None    Years of education: None    Highest education level: None   Occupational History    None   Tobacco Use    Smoking status: Never Smoker    Smokeless tobacco: Never Used   Vaping Use    Vaping Use: Never used   Substance and Sexual Activity    Alcohol use: No    Drug use: No    Sexual activity: Yes Partners: Male   Other Topics Concern    None   Social History Narrative    None     Social Determinants of Health     Financial Resource Strain:     Difficulty of Paying Living Expenses: Not on file   Food Insecurity:     Worried About Running Out of Food in the Last Year: Not on file    Kobe of Food in the Last Year: Not on file   Transportation Needs:     Lack of Transportation (Medical): Not on file    Lack of Transportation (Non-Medical): Not on file   Physical Activity:     Days of Exercise per Week: Not on file    Minutes of Exercise per Session: Not on file   Stress:     Feeling of Stress : Not on file   Social Connections:     Frequency of Communication with Friends and Family: Not on file    Frequency of Social Gatherings with Friends and Family: Not on file    Attends Pentecostal Services: Not on file    Active Member of [address] [place] [address] or Organizations: Not on file    Attends Club or Organization Meetings: Not on file    Marital Status: Not on file   Intimate Partner Violence:     Fear of Current or Ex-Partner: Not on file    Emotionally Abused: Not on file    Physically Abused: Not on file    Sexually Abused: Not on file   Housing Stability:     Unable to Pay for Housing in the Last Year: Not on file    Number of [place] in the Last Year: Not on file    Unstable Housing in the Last Year: Not on file       Current Outpatient Medications   Medication Sig Dispense Refill    ibuprofen (ADVIL;MOTRIN) 800 MG tablet Take 1 tablet by mouth every 8 hours as needed for Pain 90 tablet 3    SLYND 4 MG TABS        No current facility-administered medications for this visit.        No Known Allergies    Review of Systems:  See above      Physical Exam:   BP (!) 103/56 (Site: Left Upper Arm, Position: Sitting, Cuff Size: Small Adult)   Pulse 62   Resp 16   Ht 5' 3\" (1.6 m)   Wt 131 lb (59.4 kg)   SpO2 98%   BMI 23.21 kg/m²        Gait is Normal. The patient can bear weight on the injured extremity. Gen/Psych:Examination reveals a pleasant individual in no acute distress. The patient is oriented to time, place and person. The patient's mood and affect are appropriate. Patient appears well nourished. Body habitus is normal     Lymph:  no lymphedema in bilateral lower extremities     Skin intact in bilateral lower extremities with no ulcerations, lesions, rash, erythema. Vascular: There are no varicosities in bilateral lower extremities, sensation intact to light touch over bilateral lower extremities. left leg/knee exam:  Leg alignment:     neutral  Quadriceps/hamstring atrophy:   no  Knee effusion:    no   Knee erythema:   no  ROM:     3-135°  Lachman:    no  Pivot Shift:    no  Posterior drawer:   no  Lateral patella glide at 30 deg's: 25% with increased pain with lateral glide         Medial patella glide at 30 deg's: 10mm  Increased ER at 30 deg's:  no  Varus laxity at 0 and 30 deg's: no  Valguslaxity at 0 and 30 deg's: no  Recurvatum:    no  Tenderness at:   Lateral patellar facet and patellar tendon and infrapatellar fat pad    Knee strength is 5/5 flexion and extension  There is + L2-S1 motor and sensory function. Imaging studies  X-rays were reviewed which showed no significant degenerative change, no acute fractures or dislocations. Impression:     Diagnosis Orders   1. Patellar maltracking, left  MRI KNEE LEFT WO CONTRAST           Plan:   Because she has failed to have resolution or at least improvement from physical therapy and continues to have pain we will go ahead and do an MRI of the left knee to evaluate for internal derangement of the left knee. Patient Instructions   Central scheduling # [phone]    MRI of left knee  Follow-up in 2-3 weeks to go over MRI.   Weightbearing as tolerated  Over-the-counter medication as needed for pain

## 2022-06-13 NOTE — PATIENT INSTRUCTIONS
Central scheduling # [phone]    MRI of left knee  Follow-up in 2-3 weeks to go over MRI.   Weightbearing as tolerated  Over-the-counter medication as needed for pain

## 2022-06-24 ENCOUNTER — HOSPITAL ENCOUNTER (OUTPATIENT)
Dept: MRI IMAGING | Age: 27
Discharge: HOME | End: 2022-06-24
Payer: COMMERCIAL

## 2022-06-24 DIAGNOSIS — M22.8X2 PATELLAR MALTRACKING, LEFT: ICD-10-CM

## 2022-06-24 PROCEDURE — 73721 MRI JNT OF LWR EXTRE W/O DYE: CPT

## 2022-06-27 ENCOUNTER — OFFICE VISIT (OUTPATIENT)
Dept: ORTHOPEDIC SURGERY | Age: 27
End: 2022-06-27
Payer: COMMERCIAL

## 2022-06-27 VITALS
HEART RATE: 86 BPM | SYSTOLIC BLOOD PRESSURE: 112 MMHG | DIASTOLIC BLOOD PRESSURE: 86 MMHG | RESPIRATION RATE: 16 BRPM | HEIGHT: 63 IN | WEIGHT: 132 LBS | BODY MASS INDEX: 23.39 KG/M2 | OXYGEN SATURATION: 98 %

## 2022-06-27 DIAGNOSIS — M22.42 CHONDROMALACIA, PATELLA, LEFT: Primary | ICD-10-CM

## 2022-06-27 PROCEDURE — 99213 OFFICE O/P EST LOW 20 MIN: CPT | Performed by: PHYSICIAN ASSISTANT

## 2022-06-27 PROCEDURE — 1036F TOBACCO NON-USER: CPT | Performed by: PHYSICIAN ASSISTANT

## 2022-06-27 PROCEDURE — G8427 DOCREV CUR MEDS BY ELIG CLIN: HCPCS | Performed by: PHYSICIAN ASSISTANT

## 2022-06-27 PROCEDURE — 20610 DRAIN/INJ JOINT/BURSA W/O US: CPT | Performed by: PHYSICIAN ASSISTANT

## 2022-06-27 PROCEDURE — G8420 CALC BMI NORM PARAMETERS: HCPCS | Performed by: PHYSICIAN ASSISTANT

## 2022-06-27 ASSESSMENT — ENCOUNTER SYMPTOMS
GASTROINTESTINAL NEGATIVE: 1
EYES NEGATIVE: 1
RESPIRATORY NEGATIVE: 1

## 2022-06-27 NOTE — PROGRESS NOTES
Review of Systems   Constitutional: Negative. HENT: Negative. Eyes: Negative. Respiratory: Negative. Cardiovascular: Negative. Gastrointestinal: Negative. Genitourinary: Negative. Musculoskeletal: Positive for arthralgias. Skin: Negative. Negative for rash and wound. Neurological: Negative. Psychiatric/Behavioral: Negative. HPI:  Ayah Ojeda is a 27 y.o. female who is coming back into the office today to go over MRI results of her left knee. She continues to have some baseline knee pain even while sitting but has increased knee pain around the kneecap when she does activity. She has not had any injuries recently. She rates her pain at a 4/10, aching in nature.     Past Medical History:   Diagnosis Date    H/O cardiovascular stress test 02/26/2021    normal stress    H/O echocardiogram 02/23/2021    EF 55-60% no evidence of pericardial effusion no significant valvular abnormalities       Past Surgical History:   Procedure Laterality Date    APPENDECTOMY  08/2017    LYMPHADENECTOMY      TONSILLECTOMY         Family History   Problem Relation Age of Onset    Heart Disease Mother         pacemaker   Elida Hernetso Migraines Mother     Coronary Art Dis Other         GRANDFATHER AND GREAT GRANDFATHER (NOT SPECIFIED)    Diabetes Other         GRANDMOTHER - NON INSULIN DEPENDNET       Social History     Socioeconomic History    Marital status: Single     Spouse name: None    Number of children: None    Years of education: None    Highest education level: None   Occupational History    None   Tobacco Use    Smoking status: Never Smoker    Smokeless tobacco: Never Used   Vaping Use    Vaping Use: Never used   Substance and Sexual Activity    Alcohol use: No    Drug use: No    Sexual activity: Yes     Partners: Male   Other Topics Concern    None   Social History Narrative    None     Social Determinants of Health     Financial Resource Strain:     Difficulty of Paying Living Expenses: Not on file   Food Insecurity:     Worried About [address] [employer] in the Last Year: Not on file    Kobe of Food in the Last Year: Not on file   Transportation Needs:     Lack of Transportation (Medical): Not on file    Lack of Transportation (Non-Medical): Not on file   Physical Activity:     Days of Exercise per Week: Not on file    Minutes of Exercise per Session: Not on file   Stress:     Feeling of Stress : Not on file   Social Connections:     Frequency of Communication with Friends and Family: Not on file    Frequency of Social Gatherings with Friends and Family: Not on file    Attends Mormon Services: Not on file    Active Member of [address] [place] [address] or Organizations: Not on file    Attends Club or Organization Meetings: Not on file    Marital Status: Not on file   Intimate Partner Violence:     Fear of Current or Ex-Partner: Not on file    Emotionally Abused: Not on file    Physically Abused: Not on file    Sexually Abused: Not on file   Housing Stability:     Unable to Pay for Housing in the Last Year: Not on file    Number of [place] in the Last Year: Not on file    Unstable Housing in the Last Year: Not on file       Current Outpatient Medications   Medication Sig Dispense Refill    SLYND 4 MG TABS       ibuprofen (ADVIL;MOTRIN) 800 MG tablet Take 1 tablet by mouth every 8 hours as needed for Pain 90 tablet 3     No current facility-administered medications for this visit. No Known Allergies    Review of Systems:  See above      Physical Exam:   /86 (Site: Left Upper Arm, Position: Sitting, Cuff Size: Medium Adult)   Pulse 86   Resp 16   Ht 5' 3\" (1.6 m)   Wt 132 lb (59.9 kg)   SpO2 98%   BMI 23.38 kg/m²        Gait is Antalgic. The patient can bear weight on the injured extremity. Gen/Psych:Examination reveals a pleasant individual in no acute distress. The patient is oriented to time, place and person.   The patient's mood and affect are appropriate. Patient appears well nourished. Body habitus is normal     Lymph:  No lymphedema in bilateral lower extremities     Skin intact in bilateral lower extremities with no ulcerations, lesions, rash, erythema. Vascular: There are no varicosities in bilateral lower extremities, sensation intact to light touch over bilateral lower extremities. left leg/knee exam:  Leg alignment:     neutral  Quadriceps/hamstring atrophy:   no  Knee effusion:    no   Knee erythema:   no  ROM:     0-130°  Varus laxity at 0 and 30 deg's: no  Valguslaxity at 0 and 30 deg's: no  Recurvatum:    no  Tenderness at:   patella      Bilateral Knee strength is 5/5 flexion and extension  There is + L2-S1 motor and sensory function in bilateral lower extremities    Outside record review: office notes and x-rays    Imaging studies:  Impression   Patella appears appropriately located without findings for patellar   maltracking.  However, there is high-grade chondromalacia patella and there   is a minimal degree of edema in Hoffa's fat pad superiorly which is   nonspecific but can be seen with impingement.       No meniscus or ligament pathology noted. Impression:     Diagnosis Orders   1. Chondromalacia, patella, left  DE ARTHROCENTESIS ASPIR&/INJ MAJOR JT/BURSA W/O US           Plan:    I did discuss with her the findings on the MRI and explained to her that at this point I would not recommend any surgery but because of her persistent pain and the findings on the MRI we could try a one-time injection. She agreed to try the injection. Patient Instructions   Continue to weight bear as tolerated  Continue range of motion  Ice and elevate as needed  Tylenol or Motrin for pain  Injection given today in the office   Rest for 24-48 hours  Follow up in 6 weeks    Left knee injection procedure note    Pre-procedure diagnosis:  Left knee DJD    Post-procedure diagnosis:  same    Procedure:  The planned procedure/risks/benefits/alternatives were discussed with the patient. Risks include, but are not limited to, increased pain, drug reaction, infection, bleeding, lack of improvement, neurovascular injury, and increased blood sugar levels. The patient understood and all of their questions were answered. The Left knee was prepped with alcohol then a 25 gauge needle was advanced into the inferior-lateral joint without difficulty. The joint was then injected with 1 ml 1% lidocaine, 1ml of Kenalog (40 mg/ml), 1ml 0.5% bupivacaine the injection site was cleansed with isopropyl alcohol and a band-aid was placed. Complications:  None, the patient tolerated the procedure well. Instructions: The patient was advised to rest the knee and decrease activity for the next 24 to 48 hours. May use prescription or OTC pain relievers as needed for any post-injection pain as well as ice.

## 2022-06-27 NOTE — PATIENT INSTRUCTIONS
Continue to weight bear as tolerated  Continue range of motion  Ice and elevate as needed  Tylenol or Motrin for pain  Injection given today in the office   Rest for 24-48 hours  Follow up in 6 weeks

## 2022-07-11 ENCOUNTER — OFFICE VISIT (OUTPATIENT)
Dept: FAMILY MEDICINE CLINIC | Age: 27
End: 2022-07-11
Payer: COMMERCIAL

## 2022-07-11 VITALS
DIASTOLIC BLOOD PRESSURE: 62 MMHG | WEIGHT: 132 LBS | SYSTOLIC BLOOD PRESSURE: 104 MMHG | HEART RATE: 76 BPM | OXYGEN SATURATION: 97 % | BODY MASS INDEX: 23.39 KG/M2 | HEIGHT: 63 IN

## 2022-07-11 DIAGNOSIS — S90.222A CONTUSION OF TOENAIL OF LEFT FOOT, INITIAL ENCOUNTER: Primary | ICD-10-CM

## 2022-07-11 PROCEDURE — 1036F TOBACCO NON-USER: CPT | Performed by: PHYSICIAN ASSISTANT

## 2022-07-11 PROCEDURE — G8420 CALC BMI NORM PARAMETERS: HCPCS | Performed by: PHYSICIAN ASSISTANT

## 2022-07-11 PROCEDURE — 99213 OFFICE O/P EST LOW 20 MIN: CPT | Performed by: PHYSICIAN ASSISTANT

## 2022-07-11 PROCEDURE — G8427 DOCREV CUR MEDS BY ELIG CLIN: HCPCS | Performed by: PHYSICIAN ASSISTANT

## 2022-07-11 ASSESSMENT — PATIENT HEALTH QUESTIONNAIRE - PHQ9
SUM OF ALL RESPONSES TO PHQ QUESTIONS 1-9: 0
2. FEELING DOWN, DEPRESSED OR HOPELESS: 0
SUM OF ALL RESPONSES TO PHQ QUESTIONS 1-9: 0
SUM OF ALL RESPONSES TO PHQ9 QUESTIONS 1 & 2: 0
SUM OF ALL RESPONSES TO PHQ QUESTIONS 1-9: 0
SUM OF ALL RESPONSES TO PHQ QUESTIONS 1-9: 0
1. LITTLE INTEREST OR PLEASURE IN DOING THINGS: 0

## 2022-07-11 NOTE — PROGRESS NOTES
7/11/2022    [address]     Chief Complaint   Patient presents with    Nail Problem     spot under big toe nail, left foot, present for over a month, patient notes getting bigger and darker       HPI  History obtained from the patient. Dallin Villela is a 27 y.o. female who presents today for \"spot\" under left big toenail X \"a little over a month. \" Patient states, \"At first I thought it was a bruise, but then it kept getting darker and bigger. It doesn't hurt at all. \" Denies history of injury. PHQ-9 Total Score: 0 (7/11/2022 11:37 AM)    REVIEW OF SYMPTOMS  Review of Systems   Constitutional: Negative for chills and fever. Respiratory: Negative for cough and shortness of breath. Gastrointestinal: Negative for diarrhea and vomiting.      PAST MEDICAL HISTORY  Past Medical History:   Diagnosis Date    H/O cardiovascular stress test 02/26/2021    normal stress    H/O echocardiogram 02/23/2021    EF 55-60% no evidence of pericardial effusion no significant valvular abnormalities       FAMILY HISTORY  Family History   Problem Relation Age of Onset    Heart Disease Mother         pacemaker   Robertha Rumps Migraines Mother     Coronary Art Dis Other         GRANDFATHER AND GREAT GRANDFATHER (NOT SPECIFIED)    Diabetes Other         GRANDMOTHER - NON INSULIN DEPENDNET       SOCIAL HISTORY  Social History     Socioeconomic History    Marital status: Single     Spouse name: None    Number of children: None    Years of education: None    Highest education level: None   Occupational History    None   Tobacco Use    Smoking status: Never Smoker    Smokeless tobacco: Never Used   Vaping Use    Vaping Use: Never used   Substance and Sexual Activity    Alcohol use: No    Drug use: No    Sexual activity: Yes     Partners: Male   Other Topics Concern    None   Social History Narrative    None     Social Determinants of Health     Financial Resource Strain:     Difficulty of Paying Living Expenses: Not on file   Food Insecurity:     Worried About Running Out of Food in the Last Year: Not on file    Kobe of Food in the Last Year: Not on file   Transportation Needs:     Lack of Transportation (Medical): Not on file    Lack of Transportation (Non-Medical): Not on file   Physical Activity:     Days of Exercise per Week: Not on file    Minutes of Exercise per Session: Not on file   Stress:     Feeling of Stress : Not on file   Social Connections:     Frequency of Communication with Friends and Family: Not on file    Frequency of Social Gatherings with Friends and Family: Not on file    Attends Cheondoism Services: Not on file    Active Member of [address] [place] [address] Circle or Organizations: Not on file    Attends Club or Organization Meetings: Not on file    Marital Status: Not on file   Intimate Partner Violence:     Fear of Current or Ex-Partner: Not on file    Emotionally Abused: Not on file    Physically Abused: Not on file    Sexually Abused: Not on file   Housing Stability:     Unable to Pay for Housing in the Last Year: Not on file    Number of [place] in the Last Year: Not on file    Unstable Housing in the Last Year: Not on file        SURGICAL HISTORY  Past Surgical History:   Procedure Laterality Date    APPENDECTOMY  08/2017    LYMPHADENECTOMY      TONSILLECTOMY         CURRENT MEDICATIONS  Current Outpatient Medications   Medication Sig Dispense Refill    SLYND 4 MG TABS       ibuprofen (ADVIL;MOTRIN) 800 MG tablet Take 1 tablet by mouth every 8 hours as needed for Pain 90 tablet 3     No current facility-administered medications for this visit.        ALLERGIES  No Known Allergies    RECENT LABS    Lab Results   Component Value Date    LABA1C 5.1 10/11/2021     Lab Results   Component Value Date    EAG 99.7 10/11/2021       No results found for: CHOL  No results found for: LDLCHOLESTEROL, [address]    Lab Results   Component Value Date    WBC 11.3 (H) 08/12/2021    HGB 11.9 (L) 08/12/2021    HCT 37.4 08/12/2021 MCV 94.0 08/12/2021     08/12/2021       PHYSICAL EXAM  /62   Pulse 76   Ht 5' 3\" (1.6 m)   Wt 132 lb (59.9 kg)   SpO2 97%   BMI 23.38 kg/m²     PHYSICAL EXAMINATION:    Constitutional: Appears well-developed and well-nourished. No apparent distress. Mental status: Alert and awake, Oriented to person/place/time, Able to follow commands   Eyes: EOM normal, sclera normal, no visible discharge. HENT: Normocephalic, atraumatic. Mouth/Throat: Mucous membranes are moist. External Ears Normal   Neck: No visualized mass   Pulmonary/Chest: Respiratory effort normal. No visualized signs of difficulty breathing or respiratory distress   Musculoskeletal: Normal gait with no signs of ataxia. Normal range of motion of neck  Neurological:No Facial Asymmetry (Cranial nerve 7 motor function). No gaze palsy. Skin: No significant exanthematous lesions or discoloration noted on facial skin  Psychiatric: Normal Affect. No Hallucinations. Left Great Toe: Small, pinpoint dark brown annular lesion below surface of left great toe, nontender, pictured below. ASSESSMENT & PLAN  1. Contusion of toenail of left foot, initial encounter  Instructed patient to continue monitoring this, use pictures to keep track of it. If it changes or grows significantly in size, let me know, and I will refer her to podiatry. No follow-ups on file.             Electronically signed by Jimmy Solis PA-C on 7/11/2022

## 2022-08-08 ENCOUNTER — OFFICE VISIT (OUTPATIENT)
Dept: ORTHOPEDIC SURGERY | Age: 27
End: 2022-08-08
Payer: COMMERCIAL

## 2022-08-08 VITALS
RESPIRATION RATE: 16 BRPM | BODY MASS INDEX: 23.04 KG/M2 | SYSTOLIC BLOOD PRESSURE: 92 MMHG | OXYGEN SATURATION: 97 % | HEART RATE: 76 BPM | WEIGHT: 130 LBS | DIASTOLIC BLOOD PRESSURE: 60 MMHG | HEIGHT: 63 IN

## 2022-08-08 DIAGNOSIS — M22.42 CHONDROMALACIA, PATELLA, LEFT: Primary | ICD-10-CM

## 2022-08-08 PROCEDURE — 1036F TOBACCO NON-USER: CPT | Performed by: PHYSICIAN ASSISTANT

## 2022-08-08 PROCEDURE — G8420 CALC BMI NORM PARAMETERS: HCPCS | Performed by: PHYSICIAN ASSISTANT

## 2022-08-08 PROCEDURE — 99212 OFFICE O/P EST SF 10 MIN: CPT | Performed by: PHYSICIAN ASSISTANT

## 2022-08-08 PROCEDURE — G8427 DOCREV CUR MEDS BY ELIG CLIN: HCPCS | Performed by: PHYSICIAN ASSISTANT

## 2022-08-08 ASSESSMENT — ENCOUNTER SYMPTOMS
RESPIRATORY NEGATIVE: 1
GASTROINTESTINAL NEGATIVE: 1
EYES NEGATIVE: 1

## 2022-08-08 NOTE — PROGRESS NOTES
Review of Systems   Constitutional: Negative. HENT: Negative. Eyes: Negative. Respiratory: Negative. Cardiovascular: Negative. Gastrointestinal: Negative. Genitourinary: Negative. Musculoskeletal:  Positive for arthralgias and myalgias. Skin: Negative. Neurological: Negative. Psychiatric/Behavioral: Negative. HPI:  Elis Head is a 27 y.o. female that presents the office today following up on the injection that was given to her in the left knee. She states that the injection did get rid of her pain completely for about 5 days but then the pain returned. She states that most of her knee pain is around the inferior portion of the patella especially when she extends the knee straight out. She has had an MRI of the knee which did not reveal any meniscal tear or ligament tear.     Past Medical History:   Diagnosis Date    H/O cardiovascular stress test 02/26/2021    normal stress    H/O echocardiogram 02/23/2021    EF 55-60% no evidence of pericardial effusion no significant valvular abnormalities       Past Surgical History:   Procedure Laterality Date    APPENDECTOMY  08/2017    LYMPHADENECTOMY      TONSILLECTOMY         Family History   Problem Relation Age of Onset    Heart Disease Mother         pacemaker    Migraines Mother     Coronary Art Dis Other         GRANDFATHER AND GREAT GRANDFATHER (NOT SPECIFIED)    Diabetes Other         GRANDMOTHER - NON INSULIN DEPENDNET       Social History     Socioeconomic History    Marital status: Single     Spouse name: None    Number of children: None    Years of education: None    Highest education level: None   Tobacco Use    Smoking status: Never    Smokeless tobacco: Never   Vaping Use    Vaping Use: Never used   Substance and Sexual Activity    Alcohol use: No    Drug use: No    Sexual activity: Yes     Partners: Male       Current Outpatient Medications   Medication Sig Dispense Refill    SLYND 4 MG TABS       ibuprofen appears appropriately situated within the femoral trochlea. TT-TG interval   is within normal limits measuring 1 cm. LATERAL COLLATERAL LIGAMENT COMPLEX: Lateral collateral ligament complex   appears intact and unremarkable. Popliteus tendon appears intact. MEDIAL COLLATERAL LIGAMENT COMPLEX: Medial collateral ligament appears intact   and unremarkable. KNEE JOINT: No knee joint effusion. No intra-articular loose bodies. No   significant Baker's cyst.  Minimal edema in Hoffa's fat pad superiorly. There is some partial-thickness articular cartilage loss involving the   lateral facet of the patella inferiorly along with likely some mild   fibrillation of articular cartilage in this same region. There is minimal   underlying likely reactive bone marrow edema. Articular cartilage to the   remainder of the knee appears to be well maintained. No focal full-thickness   chondral defects or osteochondral lesions. BONE MARROW: No evidence for occult fracture. No suspicious focal bony   lesions. Impression   Patella appears appropriately located without findings for patellar   maltracking. However, there is high-grade chondromalacia patella and there   is a minimal degree of edema in Hoffa's fat pad superiorly which is   nonspecific but can be seen with impingement. No meniscus or ligament pathology noted. Impression:    Diagnosis Orders   1. Chondromalacia, patella, left                Plan:   I explained to the patient that it is possible that she does have something a little bit more than just some cartilage loss within the knee that the MRI was not able to  however it could just be that she has a softening of the cartilage and some impingement with fat pad and arthroscopy could be used to further evaluate this and possibly help treat it. Follow-up with Dr. Sheri Pan to discuss potential left knee arthroscopy after failing therapy and injection.

## 2022-08-17 ENCOUNTER — TELEPHONE (OUTPATIENT)
Dept: FAMILY MEDICINE CLINIC | Age: 27
End: 2022-08-17

## 2022-08-17 DIAGNOSIS — R05.9 COUGH: ICD-10-CM

## 2022-08-17 DIAGNOSIS — U07.1 COVID-19: Primary | ICD-10-CM

## 2022-08-17 RX ORDER — PREDNISONE 10 MG/1
TABLET ORAL
Qty: 20 TABLET | Refills: 0 | Status: SHIPPED | OUTPATIENT
Start: 2022-08-17

## 2022-08-17 RX ORDER — BENZONATATE 100 MG/1
100-200 CAPSULE ORAL 3 TIMES DAILY PRN
Qty: 60 CAPSULE | Refills: 0 | Status: SHIPPED | OUTPATIENT
Start: 2022-08-17 | End: 2022-08-24

## 2022-08-17 NOTE — TELEPHONE ENCOUNTER
----Tested positive for COVID (symptoms started on Monday)-----Headache, cough, fever, body aches, runny nose (received 1st and 2nd COVID vaccines)    Is there anything that can be called in for her symptoms----she is really miserable.           [facility]

## 2022-08-22 NOTE — DISCHARGE SUMMARY
Outpatient Physical Therapy           [place]           [] Phone: [phone]   Fax: [phone]  [place]           [] Phone: [phone]   Fax: [phone]      To: Kristine Edwards PA-C     From: Deyanira Pickering, PT, DPT, OCS      Patient: Hi Ram                    : 1995  Diagnosis:  Other disorders of patella, left knee [M22.8X2]        Treatment Diagnosis:    L knee pain, weakness, limited knee ext ROM   Date: 2022  []  Progress Note                [x]  Discharge Note    Evaluation Date:  5/3/22   Total Visits to date:  9  Cancels/No-shows to date:  0    Subjective:  Per note on 22  Olga Lidia Parents states that the pain is a stabbing feeling. The pain increases more when her leg is fully extended. She sates that the knee was hurting all day yesterday. Plan of Care/Treatment to date:  [x] Therapeutic Exercise    [x] Modalities:  [x] Therapeutic Activity     [x] Ultrasound  [] Electrical Stimulation  [x] Gait Training      [] Cervical Traction   [] Lumbar Traction  [x] Neuromuscular Re-education  [x] Cold/hotpack [] Iontophoresis  [x] Instruction in HEP      Other:  [x] Manual Therapy       [x]  Vasopneumatic  [] Aquatic Therapy       []   Dry Needle Therapy                      Objective/Significant Findings At Last Visit/Comments:  Per note on 22     Unable to toe/heel walk. At arrival very mild antalgic gait with lack of TKE  Full squat with pain through entire range to TKE  Increase pain with quad set, demo full TKE passively and full knee flexion  Unable to complete full TKE with short arc quad or long arc quad.   0 deg knee ext passively with immediate pain with activation  to full knee flexion 155 deg   L SLS: >30 sec with no increase in pain. 30 sec sit to stand: 12 reps without increase in pain.    L Knee Flexion: 5/5 (Microfit: 21#)  L Knee Extension: 5/5 (Microfit: 35#)  KOOS:  disability      Assessment:   Pt has completed 9 visits since start of therapy on 5/3/22. Pt remains with pain mostly knee extended with active quad activation but does have pain with closed chain activities as well. Demo full passive knee flex/Ext and normal patellar glides with min change with alteration in patellar position with quad activation. Pain has remained with knee ext with quad activation. Demo objective improvement in strength and remains functional with ongoing terminal knee pain actively. Anticipate return follow up to Ortho to discuss future management. Pt  will be discharged with home program at this time. Goal Status:  [] Achieved [x] Partially Achieved  [] Not Achieved     Patient goals: improve pain to ease ADLs. Short term goals  Time Frame for Short term goals: Defer to LTG. Long Term Goals  Time Frame for Long Term Goals: 6 weeks 6/17/22  Pt will demo I with HEP/symptom management. Met - reports compliance   Pt will demo able to extend knee in supine and standing without increase in pain to ease gait mechanics Partially MET   Pt will demo 5/5 knee flex/ext strength with >5# improvement per Microfit to ease transfers. MET  Pt will demo KOOS <10/28 disability to demo improved function. Partially MET   Pt will demo sit to stand transfer and squat without increase in pain to ease ADLs. Partially MET          Patient Status: [] Continue per initial plan of Care     [x] Patient now discharged     [] Additional visits requested, Please re-certify for additional visits: If we are requesting more visits, we fully anticipate the patient's condition is expected to improve within the treatment timeframe we are requesting. Electronically signed by:  Anand Lynn, PT, DPT, OCS  8/22/2022, 8:41 AM    8/22/2022 8:41 AM     If you have any questions or concerns, please don't hesitate to call.   Thank you for your referral.    Physician Signature:______________________ Date:______ Time: ________  By signing above, therapists plan is approved by physician

## 2022-10-05 ENCOUNTER — TELEPHONE (OUTPATIENT)
Dept: ORTHOPEDIC SURGERY | Age: 27
End: 2022-10-05

## 2022-10-05 ENCOUNTER — OFFICE VISIT (OUTPATIENT)
Dept: ORTHOPEDIC SURGERY | Age: 27
End: 2022-10-05
Payer: COMMERCIAL

## 2022-10-05 VITALS
HEIGHT: 63 IN | DIASTOLIC BLOOD PRESSURE: 72 MMHG | WEIGHT: 130 LBS | SYSTOLIC BLOOD PRESSURE: 90 MMHG | BODY MASS INDEX: 23.04 KG/M2 | RESPIRATION RATE: 15 BRPM

## 2022-10-05 DIAGNOSIS — M22.42 CHONDROMALACIA, PATELLA, LEFT: Primary | ICD-10-CM

## 2022-10-05 PROCEDURE — G8427 DOCREV CUR MEDS BY ELIG CLIN: HCPCS | Performed by: ORTHOPAEDIC SURGERY

## 2022-10-05 PROCEDURE — G8420 CALC BMI NORM PARAMETERS: HCPCS | Performed by: ORTHOPAEDIC SURGERY

## 2022-10-05 PROCEDURE — G8484 FLU IMMUNIZE NO ADMIN: HCPCS | Performed by: ORTHOPAEDIC SURGERY

## 2022-10-05 PROCEDURE — 1036F TOBACCO NON-USER: CPT | Performed by: ORTHOPAEDIC SURGERY

## 2022-10-05 PROCEDURE — 99203 OFFICE O/P NEW LOW 30 MIN: CPT | Performed by: ORTHOPAEDIC SURGERY

## 2022-10-05 RX ORDER — CLINDAMYCIN PHOSPHATE, BENZOYL PEROXIDE 25; 10 MG/G; MG/G
GEL TOPICAL
COMMUNITY
Start: 2022-08-09

## 2022-10-05 ASSESSMENT — ENCOUNTER SYMPTOMS
COLOR CHANGE: 0
EYE REDNESS: 0
BACK PAIN: 0
ABDOMINAL PAIN: 0
CHEST TIGHTNESS: 0

## 2022-10-05 NOTE — PROGRESS NOTES
Subjective:      Patient ID: Rodrigue Winn is a 24 y.o. female. Patient returns to the office today for FU of the left knee. Last knee injection given on 6/27/22. MRI of the left knee completed on 6/13/22  Impression  Patella appears appropriately located without findings for patellar  maltracking. However, there is high-grade chondromalacia patella and there  is a minimal degree of edema in Hoffa's fat pad superiorly which is  nonspecific but can be seen with impingement. No meniscus or ligament pathology noted. She comes in today for her first visit with me in regards to her left knee pain. She states that she has been dealing with pain in her left knee for several years. She describes the pain as a sharp, stabbing pain in the anterior aspect of the left knee which is worse when straightening out her left knee or when playing with her children. She has tried physical therapy, cortisone injection, bracing without any improvement in her symptoms. Patient denies any new injury to the involved extremity/ joint, denies numbness or tingling in the involved extremity and denies fever or chills. Review of Systems   Constitutional:  Negative for activity change, chills and fever. HENT:  Negative for congestion and drooling. Eyes:  Negative for redness. Respiratory:  Negative for chest tightness. Cardiovascular:  Negative for chest pain. Gastrointestinal:  Negative for abdominal pain. Endocrine: Negative for cold intolerance and heat intolerance. Musculoskeletal:  Positive for arthralgias and myalgias. Negative for back pain, gait problem and joint swelling. Skin:  Negative for color change, pallor, rash and wound. Neurological:  Negative for weakness and numbness. Psychiatric/Behavioral:  Negative for confusion.       Past Medical History:   Diagnosis Date    H/O cardiovascular stress test 02/26/2021    normal stress    H/O echocardiogram 02/23/2021    EF 55-60% no evidence of pericardial effusion no significant valvular abnormalities       Objective:   Physical Exam  Constitutional:       Appearance: She is well-developed. HENT:      Head: Normocephalic. Nose: No congestion. Eyes:      Pupils: Pupils are equal, round, and reactive to light. Pulmonary:      Effort: Pulmonary effort is normal.   Musculoskeletal:         General: Tenderness present. No deformity. Normal range of motion. Cervical back: Normal range of motion. Right hip: Normal.      Left hip: Normal.      Right knee: No swelling, deformity, effusion, erythema, ecchymosis, lacerations or bony tenderness. Normal range of motion. No tenderness. No medial joint line, lateral joint line, MCL, LCL or patellar tendon tenderness. No LCL laxity or MCL laxity. Normal alignment and normal patellar mobility. Left knee: No swelling, deformity, effusion, erythema, ecchymosis, lacerations or bony tenderness. Normal range of motion. Tenderness present over the patellar tendon. No medial joint line, lateral joint line, MCL or LCL tenderness. No LCL laxity or MCL laxity. Normal alignment and normal patellar mobility. Skin:     General: Skin is warm and dry. Capillary Refill: Capillary refill takes less than 2 seconds. Coloration: Skin is not pale. Findings: No erythema or rash. Neurological:      Mental Status: She is alert and oriented to person, place, and time. Sensory: No sensory deficit. Motor: No weakness. Left knee-Skin intact with no erythema, ecchymosis or lacerations present.   Moderate tenderness on the patellar tendon and the inferior pole the patella  0-140    XRAY  X-ray 4 views of the left knee from April 25, 2022 as well as MRI of the left knee from June 24, 2022 reviewed by me today in the office demonstrates age appropriate bone density throughout with high-grade chondromalacia on the inferior pole of the patella with mild inflammation of Hoffa's fat pad otherwise no internal derangement. Assessment:      Left knee chondromalacia patella      Plan:      I discussed with her today her x-ray and MRI findings. I explained to her that she does have some loose cartilage on the patella as well as inflammation around the patellar tendon. At this point given her persistent symptoms despite conservative treatment and with her MRI findings, the next step is to consider surgical treatment. I discussed with her today performing left knee arthroscopy with chondroplasty and debridement of her infrapatellar fat pad. If this does not help, the next step would be to consider long-term anti-inflammatories including Medrol Dosepak. I explained risks, benefits, possible complications of the procedure and answered all questions for the patient. I explained postoperative rehabilitation protocol and expectations with the patient today. The patient understands and consents to the procedure. Patient will follow up with their primary care physician prior to surgical treatment for preoperative clearance. We will schedule surgery at soonest convenience. Continue weight-bearing as tolerated. Continue range of motion exercises as instructed. Ice and elevate as needed. Tylenol or Motrin for pain. Follow up in 2 weeks postop. She would like to have her surgery at [facility]  She is still breast-feeding so she will need clearance from her OB as to when she can safely proceed with surgical treatment.           Boo Katz, DO

## 2022-10-05 NOTE — PATIENT INSTRUCTIONS
We will schedule surgery at soonest convenience.  If you have any questions regarding your surgery please call our office and ask to speak with Lukas Blackwell [phone]

## 2022-10-05 NOTE — TELEPHONE ENCOUNTER
Scheduled patient in office for LT KNEE SCOPE WITH CHONDROPLASTY on 10/20/22 at [facility]. Patient voiced understanding of date/time and instructions. Procedure request faxed. PCP, Cardiac and OB/Gyn Clearance request sent. Insurance ([address] [place] [address]) contacted on 10/5/22 via forms and clinicals faxed to [phone]. CPT 52327 is pending clinical review.

## 2022-10-05 NOTE — PROGRESS NOTES
Patient returns to the office today for FU of the left knee. Last knee injection given on 6/27/22. MRI of the left knee completed on 6/13/22  Impression   Patella appears appropriately located without findings for patellar   maltracking. However, there is high-grade chondromalacia patella and there   is a minimal degree of edema in Hoffa's fat pad superiorly which is   nonspecific but can be seen with impingement. No meniscus or ligament pathology noted.

## 2022-10-06 ENCOUNTER — TELEPHONE (OUTPATIENT)
Dept: CARDIOLOGY CLINIC | Age: 27
End: 2022-10-06

## 2022-10-06 NOTE — TELEPHONE ENCOUNTER
Proceed with surgery no further testing needed   Can hold anticoagulation for 2 days before surgery  Low  risk for surgery  DVT prophylaxis perioperatively

## 2022-10-06 NOTE — TELEPHONE ENCOUNTER
Cardiologist: Dr. Mendel Blake  Surgeon: Dr. Clare Wise  Surgery: Left knee arthroscopy with chondroplasty  Anesthesia: General  Date: 10/20/2022  FAX# [phone]  # [phone]    Last OV 5/4/2022 w/Connie         1. Syncope and collapse  Assessment & Plan:   No episodes since last OV  Continue to stay hydrated and wear compression stockings. 2. Orthostatic hypotension  Assessment & Plan:   Borderline controlled, lifestyle modifications recommended and continue to monitor   Stay hydrated try compression stocking now that she is post partum. Reviewed may need florinef if continues to have symptoms. Will wait until after finished breast feeding. Last EKG- 5/4/2022        Stress Test- 2/26/2021   The patient exercised according to the [address] for 7:01 min:s, achieving a   work level of Max. METS: 8.50. The resting heart rate of 110 bpm suzanne to a maximal heart rate of 171 bpm.   This value represents 86 % of the   maximal, age-predicted heart rate. The resting blood pressure of 90/60 mmHg   , suzanne to a maximum blood   pressure of 150/60 mmHg. The exercise test was stopped due to MET THR      Echo- 2/23/2021   Left ventricular systolic function is normal with an ejection fraction of   55-60%. Normal pulmonary artery pressure with a RVSP of 18mmHg. No significant valvular abnormalities. No evidence of pericardial effusion.

## 2022-10-07 ENCOUNTER — TELEPHONE (OUTPATIENT)
Dept: FAMILY MEDICINE CLINIC | Age: 27
End: 2022-10-07

## 2022-10-07 NOTE — TELEPHONE ENCOUNTER
Spoke with pt informed she has a procedure scheduled with Dr Ashly Denis 10/20/22 , their office faxed preop form , our office doesn't have any opening until after her surgery date.  Pt states she will call Dr Carmen Palacios office to see what they reccomend

## 2022-10-11 ENCOUNTER — ANESTHESIA EVENT (OUTPATIENT)
Dept: OPERATING ROOM | Age: 27
End: 2022-10-11
Payer: COMMERCIAL

## 2022-10-11 NOTE — ANESTHESIA PRE PROCEDURE
Department of Anesthesiology  Preprocedure Note       Name:  Jesse Liu   Age:  24 y.o.  :  1995                                          MRN:  [id number]         Date:  10/11/2022      Surgeon: Deangelo Ledesma):  Joelle Bingham DO    Procedure: Procedure(s):  LEFT KNEE ARTHROSCOPY WITH CHONDROPLASTY    Medications prior to admission:   Prior to Admission medications    Medication Sig Start Date End Date Taking? Authorizing Provider   Clindamycin Phos-Benzoyl Perox 1.2-2.5 % GEL APPLY A SMALL AMOUNT TO AFFECTED AREA ONCE DAILY. DO NOT USE FOR LONGER THAN 12 WEEKS. Patient not taking: Reported on 10/5/2022 8/9/22   Historical Provider, MD   predniSONE (DELTASONE) 10 MG tablet 1 tab 4 times daily X 2 days. THEN 1 tab 3 times daily X 2 days. THEN  1 tab 2 times daily X 2 days. THEN 0.5 tab 2 times daily X 2 days  Patient not taking: Reported on 10/5/2022 8/17/22   MICAH Tcuker - CNP   SLYND 4 MG TABS  22   Historical Provider, MD   ibuprofen (ADVIL;MOTRIN) 800 MG tablet Take 1 tablet by mouth every 8 hours as needed for Pain 21   Devon Zamorano MD       Current medications:    Current Outpatient Medications   Medication Sig Dispense Refill    Clindamycin Phos-Benzoyl Perox 1.2-2.5 % GEL APPLY A SMALL AMOUNT TO AFFECTED AREA ONCE DAILY. DO NOT USE FOR LONGER THAN 12 WEEKS. (Patient not taking: Reported on 10/5/2022)      predniSONE (DELTASONE) 10 MG tablet 1 tab 4 times daily X 2 days. THEN 1 tab 3 times daily X 2 days. THEN  1 tab 2 times daily X 2 days. THEN 0.5 tab 2 times daily X 2 days (Patient not taking: Reported on 10/5/2022) 20 tablet 0    SLYND 4 MG TABS       ibuprofen (ADVIL;MOTRIN) 800 MG tablet Take 1 tablet by mouth every 8 hours as needed for Pain 90 tablet 3     No current facility-administered medications for this visit. Allergies:  No Known Allergies    Problem List:    Patient Active Problem List   Diagnosis Code    Migraine aura without headache G43. [facility]  Syncope and collapse R55    Orthostatic hypotension I95.1       Past Medical History:        Diagnosis Date    H/O cardiovascular stress test 02/26/2021    normal stress    H/O echocardiogram 02/23/2021    EF 55-60% no evidence of pericardial effusion no significant valvular abnormalities       Past Surgical History:        Procedure Laterality Date    APPENDECTOMY  08/2017    LYMPHADENECTOMY      TONSILLECTOMY         Social History:    Social History     Tobacco Use    Smoking status: Never    Smokeless tobacco: Never   Substance Use Topics    Alcohol use: No                                Counseling given: Not Answered      Vital Signs (Current): There were no vitals filed for this visit.                                            BP Readings from Last 3 Encounters:   10/05/22 90/72   08/08/22 92/60   07/11/22 104/62       NPO Status:                                                                                 BMI:   Wt Readings from Last 3 Encounters:   10/05/22 130 lb (59 kg)   08/08/22 130 lb (59 kg)   07/11/22 132 lb (59.9 kg)     There is no height or weight on file to calculate BMI.    CBC:   Lab Results   Component Value Date/Time    WBC 11.3 08/12/2021 09:30 AM    RBC 3.98 08/12/2021 09:30 AM    HGB 11.9 08/12/2021 09:30 AM    HCT 37.4 08/12/2021 09:30 AM    MCV 94.0 08/12/2021 09:30 AM    RDW 13.5 08/12/2021 09:30 AM     08/12/2021 09:30 AM       CMP:   Lab Results   Component Value Date/Time     10/11/2021 11:37 AM    K 4.5 10/11/2021 11:37 AM     10/11/2021 11:37 AM    CO2 25 10/11/2021 11:37 AM    BUN 17 10/11/2021 11:37 AM    CREATININE <0.5 10/11/2021 11:37 AM    GFRAA >60 10/11/2021 11:37 AM    AGRATIO 2.0 10/11/2021 11:37 AM    LABGLOM >60 10/11/2021 11:37 AM    GLUCOSE 65 10/11/2021 11:37 AM    PROT 6.9 11/22/2021 01:16 PM    CALCIUM 10.0 10/11/2021 11:37 AM    BILITOT 0.4 11/22/2021 01:16 PM    ALKPHOS 105 11/22/2021 01:16 PM    AST 16 11/22/2021 01:16 PM ALT 25 11/22/2021 01:16 PM       POC Tests: No results for input(s): POCGLU, POCNA, POCK, POCCL, POCBUN, POCHEMO, POCHCT in the last 72 hours. Coags: No results found for: PROTIME, INR, APTT    HCG (If Applicable):   Lab Results   Component Value Date    PREGTESTUR NEGATIVE 09/20/2020        ABGs: No results found for: PHART, PO2ART, EDT0INE, LUZ9TTG, BEART, C4ILATFF     Type & Screen (If Applicable):  No results found for: LABABO, LABRH    Drug/Infectious Status (If Applicable):  No results found for: HIV, HEPCAB    COVID-19 Screening (If Applicable):   Lab Results   Component Value Date/Time    COVID19 NOT DETECTED 08/04/2021 01:06 PM           Anesthesia Evaluation  Patient summary reviewed and Nursing notes reviewed no history of anesthetic complications:   Airway: Mallampati: II  TM distance: >3 FB   Neck ROM: full  Mouth opening: > = 3 FB   Dental: normal exam         Pulmonary:Negative Pulmonary ROS and normal exam                               Cardiovascular:  Exercise tolerance: good (>4 METS),               Echocardiogram reviewed  Stress test reviewed  Cleared by cardiology           ROS comment: Hx of stress test and echo with normal findings     Neuro/Psych:   (+) headaches: migraine headaches,             GI/Hepatic/Renal: Neg GI/Hepatic/Renal ROS            Endo/Other: Negative Endo/Other ROS                     ROS comment: Recent steroids 8/22 Abdominal:             Vascular: Other Findings:             Anesthesia Plan      general     ASA 2     (Pre-reviewed 10/11/22  )  Induction: intravenous. Anesthetic plan and risks discussed with patient and spouse. Plan discussed with CRNA.                     MICAH Zhu CRNA   10/11/2022

## 2022-10-13 NOTE — PROGRESS NOTES
.Surgery @ [facility] on 10/20/22 you will be called 10/19/22 with times               1. Do not eat or drink anything after midnight - unless instructed by your doctor prior to surgery. This includes                   no water, chewing gum or mints. 2. Follow your directions as prescribed by the doctor for your procedure and medications. 3. Check with your Doctor regarding stopping vitamins, supplements, blood thinners and follow their instructions. Stop vitamins, supplements and NSAIDS: 10/13/2022   4. Do not smoke, vape or use chewing tobacco morning of surgery. Do not drink any alcoholic beverages 24 hours prior to surgery. This includes NA Beer. No street drugs 7 days prior to surgery. 5. You may brush your teeth and gargle the morning of surgery. DO NOT SWALLOW WATER   6. You MUST make arrangements for a responsible adult to take you home after your surgery and be able to check on you every couple                   hours for the day. You will not be allowed to leave alone or drive yourself home. It is strongly suggested someone stay with you the first 24                   hrs. Your surgery will be cancelled if you do not have a ride home. 7. Please wear simple, loose fitting clothing to the hospital.  Tobias Quintana not bring valuables (money, credit cards, checkbooks, etc.) Do not wear any                   makeup (including no eye makeup) or nail polish on your fingers or toes. 8. DO NOT wear any jewelry or piercings on day of surgery. All body piercing jewelry must be removed. 9. If you have dentures, they will be removed before going to the OR; we will provide you a container. If you wear contact lenses or glasses,                  they will be removed; please bring a case for them. 10. If you  have a Living Will and Durable Power of  for Healthcare, please bring in a copy.            11. Please bring picture ID,  insurance card, paperwork from the doctors office    (H & P, Consent, & card for implantable devices). 12. Take a shower the morning of your procedure with Hibiclens or an anti-bacterial soap. Do not apply any make-up, deodorant, lotion, oil or powder. 15.  Enter thru the main entrance on the day of surgery.

## 2022-10-19 NOTE — PROGRESS NOTES
Notified patient of surgery time at [facility] 10/20/2022 0930 with arrival at [address] [place] [address] Rd[address], patient verbalized understanding

## 2022-10-20 ENCOUNTER — ANESTHESIA (OUTPATIENT)
Dept: OPERATING ROOM | Age: 27
End: 2022-10-20
Payer: COMMERCIAL

## 2022-10-20 ENCOUNTER — HOSPITAL ENCOUNTER (OUTPATIENT)
Age: 27
Setting detail: OUTPATIENT SURGERY
Discharge: HOME | End: 2022-10-20
Attending: ORTHOPAEDIC SURGERY | Admitting: ORTHOPAEDIC SURGERY
Payer: COMMERCIAL

## 2022-10-20 VITALS
WEIGHT: 135 LBS | OXYGEN SATURATION: 96 % | TEMPERATURE: 97.6 F | HEART RATE: 72 BPM | DIASTOLIC BLOOD PRESSURE: 63 MMHG | HEIGHT: 63 IN | SYSTOLIC BLOOD PRESSURE: 98 MMHG | BODY MASS INDEX: 23.92 KG/M2 | RESPIRATION RATE: 16 BRPM

## 2022-10-20 DIAGNOSIS — Z98.890 S/P LEFT KNEE ARTHROSCOPY: Primary | ICD-10-CM

## 2022-10-20 LAB
PREGNANCY TEST URINE, POC: NEGATIVE
PREGNANCY TEST URINE, POC: NEGATIVE

## 2022-10-20 PROCEDURE — 6360000002 HC RX W HCPCS: Performed by: ORTHOPAEDIC SURGERY

## 2022-10-20 PROCEDURE — 3700000001 HC ADD 15 MINUTES (ANESTHESIA): Performed by: ORTHOPAEDIC SURGERY

## 2022-10-20 PROCEDURE — 2580000003 HC RX 258: Performed by: ORTHOPAEDIC SURGERY

## 2022-10-20 PROCEDURE — 2709999900 HC NON-CHARGEABLE SUPPLY: Performed by: ORTHOPAEDIC SURGERY

## 2022-10-20 PROCEDURE — 81025 URINE PREGNANCY TEST: CPT

## 2022-10-20 PROCEDURE — 3600000014 HC SURGERY LEVEL 4 ADDTL 15MIN: Performed by: ORTHOPAEDIC SURGERY

## 2022-10-20 PROCEDURE — 3600000004 HC SURGERY LEVEL 4 BASE: Performed by: ORTHOPAEDIC SURGERY

## 2022-10-20 PROCEDURE — 6370000000 HC RX 637 (ALT 250 FOR IP): Performed by: ORTHOPAEDIC SURGERY

## 2022-10-20 PROCEDURE — 7100000011 HC PHASE II RECOVERY - ADDTL 15 MIN: Performed by: ORTHOPAEDIC SURGERY

## 2022-10-20 PROCEDURE — 3700000000 HC ANESTHESIA ATTENDED CARE: Performed by: ORTHOPAEDIC SURGERY

## 2022-10-20 PROCEDURE — 6360000002 HC RX W HCPCS: Performed by: NURSE ANESTHETIST, CERTIFIED REGISTERED

## 2022-10-20 PROCEDURE — 2720000010 HC SURG SUPPLY STERILE: Performed by: ORTHOPAEDIC SURGERY

## 2022-10-20 PROCEDURE — 7100000000 HC PACU RECOVERY - FIRST 15 MIN: Performed by: ORTHOPAEDIC SURGERY

## 2022-10-20 PROCEDURE — 6370000000 HC RX 637 (ALT 250 FOR IP): Performed by: ANESTHESIOLOGY

## 2022-10-20 PROCEDURE — 7100000010 HC PHASE II RECOVERY - FIRST 15 MIN: Performed by: ORTHOPAEDIC SURGERY

## 2022-10-20 PROCEDURE — 7100000001 HC PACU RECOVERY - ADDTL 15 MIN: Performed by: ORTHOPAEDIC SURGERY

## 2022-10-20 PROCEDURE — 29877 ARTHRS KNEE SURG DBRDMT/SHVG: CPT | Performed by: ORTHOPAEDIC SURGERY

## 2022-10-20 RX ORDER — LIDOCAINE HYDROCHLORIDE 20 MG/ML
INJECTION, SOLUTION INTRAVENOUS PRN
Status: DISCONTINUED | OUTPATIENT
Start: 2022-10-20 | End: 2022-10-20 | Stop reason: SDUPTHER

## 2022-10-20 RX ORDER — SODIUM CHLORIDE 9 MG/ML
25 INJECTION, SOLUTION INTRAVENOUS PRN
Status: DISCONTINUED | OUTPATIENT
Start: 2022-10-20 | End: 2022-10-20 | Stop reason: HOSPADM

## 2022-10-20 RX ORDER — SODIUM CHLORIDE 0.9 % (FLUSH) 0.9 %
5-40 SYRINGE (ML) INJECTION PRN
Status: CANCELLED | OUTPATIENT
Start: 2022-10-20

## 2022-10-20 RX ORDER — SODIUM CHLORIDE 9 MG/ML
INJECTION, SOLUTION INTRAVENOUS PRN
Status: DISCONTINUED | OUTPATIENT
Start: 2022-10-20 | End: 2022-10-20 | Stop reason: HOSPADM

## 2022-10-20 RX ORDER — HYDRALAZINE HYDROCHLORIDE 20 MG/ML
10 INJECTION INTRAMUSCULAR; INTRAVENOUS
Status: DISCONTINUED | OUTPATIENT
Start: 2022-10-20 | End: 2022-10-20 | Stop reason: HOSPADM

## 2022-10-20 RX ORDER — ONDANSETRON 2 MG/ML
INJECTION INTRAMUSCULAR; INTRAVENOUS PRN
Status: DISCONTINUED | OUTPATIENT
Start: 2022-10-20 | End: 2022-10-20 | Stop reason: SDUPTHER

## 2022-10-20 RX ORDER — OXYCODONE HYDROCHLORIDE 5 MG/1
5 TABLET ORAL EVERY 4 HOURS PRN
Status: CANCELLED | OUTPATIENT
Start: 2022-10-20

## 2022-10-20 RX ORDER — SODIUM CHLORIDE 9 MG/ML
INJECTION, SOLUTION INTRAVENOUS PRN
Status: CANCELLED | OUTPATIENT
Start: 2022-10-20

## 2022-10-20 RX ORDER — SODIUM CHLORIDE 0.9 % (FLUSH) 0.9 %
5-40 SYRINGE (ML) INJECTION PRN
Status: DISCONTINUED | OUTPATIENT
Start: 2022-10-20 | End: 2022-10-20 | Stop reason: HOSPADM

## 2022-10-20 RX ORDER — SODIUM CHLORIDE, SODIUM LACTATE, POTASSIUM CHLORIDE, CALCIUM CHLORIDE 600; 310; 30; 20 MG/100ML; MG/100ML; MG/100ML; MG/100ML
INJECTION, SOLUTION INTRAVENOUS CONTINUOUS
Status: CANCELLED | OUTPATIENT
Start: 2022-10-20

## 2022-10-20 RX ORDER — OXYCODONE HYDROCHLORIDE 5 MG/1
10 TABLET ORAL PRN
Status: COMPLETED | OUTPATIENT
Start: 2022-10-20 | End: 2022-10-20

## 2022-10-20 RX ORDER — OXYCODONE HYDROCHLORIDE 5 MG/1
5 TABLET ORAL PRN
Status: COMPLETED | OUTPATIENT
Start: 2022-10-20 | End: 2022-10-20

## 2022-10-20 RX ORDER — LABETALOL HYDROCHLORIDE 5 MG/ML
10 INJECTION, SOLUTION INTRAVENOUS
Status: DISCONTINUED | OUTPATIENT
Start: 2022-10-20 | End: 2022-10-20 | Stop reason: HOSPADM

## 2022-10-20 RX ORDER — HYDROCODONE BITARTRATE AND ACETAMINOPHEN 5; 325 MG/1; MG/1
1 TABLET ORAL EVERY 6 HOURS PRN
Qty: 15 TABLET | Refills: 0 | Status: SHIPPED | OUTPATIENT
Start: 2022-10-20 | End: 2022-10-27

## 2022-10-20 RX ORDER — ONDANSETRON 2 MG/ML
4 INJECTION INTRAMUSCULAR; INTRAVENOUS EVERY 6 HOURS PRN
Status: CANCELLED | OUTPATIENT
Start: 2022-10-20

## 2022-10-20 RX ORDER — FENTANYL CITRATE 50 UG/ML
INJECTION, SOLUTION INTRAMUSCULAR; INTRAVENOUS PRN
Status: DISCONTINUED | OUTPATIENT
Start: 2022-10-20 | End: 2022-10-20 | Stop reason: SDUPTHER

## 2022-10-20 RX ORDER — FENTANYL CITRATE 50 UG/ML
25 INJECTION, SOLUTION INTRAMUSCULAR; INTRAVENOUS EVERY 5 MIN PRN
Status: DISCONTINUED | OUTPATIENT
Start: 2022-10-20 | End: 2022-10-20 | Stop reason: HOSPADM

## 2022-10-20 RX ORDER — IPRATROPIUM BROMIDE AND ALBUTEROL SULFATE 2.5; .5 MG/3ML; MG/3ML
1 SOLUTION RESPIRATORY (INHALATION)
Status: DISCONTINUED | OUTPATIENT
Start: 2022-10-20 | End: 2022-10-20 | Stop reason: HOSPADM

## 2022-10-20 RX ORDER — SODIUM CHLORIDE, SODIUM LACTATE, POTASSIUM CHLORIDE, CALCIUM CHLORIDE 600; 310; 30; 20 MG/100ML; MG/100ML; MG/100ML; MG/100ML
INJECTION, SOLUTION INTRAVENOUS CONTINUOUS
Status: DISCONTINUED | OUTPATIENT
Start: 2022-10-20 | End: 2022-10-20 | Stop reason: HOSPADM

## 2022-10-20 RX ORDER — DEXAMETHASONE SODIUM PHOSPHATE 4 MG/ML
INJECTION, SOLUTION INTRA-ARTICULAR; INTRALESIONAL; INTRAMUSCULAR; INTRAVENOUS; SOFT TISSUE PRN
Status: DISCONTINUED | OUTPATIENT
Start: 2022-10-20 | End: 2022-10-20 | Stop reason: SDUPTHER

## 2022-10-20 RX ORDER — OXYCODONE HYDROCHLORIDE 5 MG/1
10 TABLET ORAL EVERY 4 HOURS PRN
Status: CANCELLED | OUTPATIENT
Start: 2022-10-20

## 2022-10-20 RX ORDER — ROPIVACAINE HYDROCHLORIDE 5 MG/ML
INJECTION, SOLUTION EPIDURAL; INFILTRATION; PERINEURAL
Status: COMPLETED | OUTPATIENT
Start: 2022-10-20 | End: 2022-10-20

## 2022-10-20 RX ORDER — SODIUM CHLORIDE 0.9 % (FLUSH) 0.9 %
5-40 SYRINGE (ML) INJECTION EVERY 12 HOURS SCHEDULED
Status: DISCONTINUED | OUTPATIENT
Start: 2022-10-20 | End: 2022-10-20 | Stop reason: HOSPADM

## 2022-10-20 RX ORDER — PROPOFOL 10 MG/ML
INJECTION, EMULSION INTRAVENOUS PRN
Status: DISCONTINUED | OUTPATIENT
Start: 2022-10-20 | End: 2022-10-20 | Stop reason: SDUPTHER

## 2022-10-20 RX ORDER — KETOROLAC TROMETHAMINE 30 MG/ML
INJECTION, SOLUTION INTRAMUSCULAR; INTRAVENOUS PRN
Status: DISCONTINUED | OUTPATIENT
Start: 2022-10-20 | End: 2022-10-20 | Stop reason: SDUPTHER

## 2022-10-20 RX ORDER — CEPHALEXIN 250 MG/1
250 CAPSULE ORAL 4 TIMES DAILY
Qty: 4 CAPSULE | Refills: 0 | Status: SHIPPED | OUTPATIENT
Start: 2022-10-20 | End: 2022-10-21

## 2022-10-20 RX ORDER — DROPERIDOL 2.5 MG/ML
0.62 INJECTION, SOLUTION INTRAMUSCULAR; INTRAVENOUS
Status: DISCONTINUED | OUTPATIENT
Start: 2022-10-20 | End: 2022-10-20 | Stop reason: HOSPADM

## 2022-10-20 RX ORDER — KETOROLAC TROMETHAMINE 30 MG/ML
30 INJECTION, SOLUTION INTRAMUSCULAR; INTRAVENOUS EVERY 6 HOURS
Status: CANCELLED | OUTPATIENT
Start: 2022-10-20 | End: 2022-10-23

## 2022-10-20 RX ORDER — ACETAMINOPHEN 500 MG
1000 TABLET ORAL ONCE
Status: COMPLETED | OUTPATIENT
Start: 2022-10-20 | End: 2022-10-20

## 2022-10-20 RX ORDER — MIDAZOLAM HYDROCHLORIDE 1 MG/ML
INJECTION INTRAMUSCULAR; INTRAVENOUS PRN
Status: DISCONTINUED | OUTPATIENT
Start: 2022-10-20 | End: 2022-10-20 | Stop reason: SDUPTHER

## 2022-10-20 RX ORDER — ONDANSETRON 4 MG/1
4 TABLET, ORALLY DISINTEGRATING ORAL EVERY 8 HOURS PRN
Status: CANCELLED | OUTPATIENT
Start: 2022-10-20

## 2022-10-20 RX ORDER — SODIUM CHLORIDE 0.9 % (FLUSH) 0.9 %
5-40 SYRINGE (ML) INJECTION EVERY 12 HOURS SCHEDULED
Status: CANCELLED | OUTPATIENT
Start: 2022-10-20

## 2022-10-20 RX ORDER — ONDANSETRON 2 MG/ML
4 INJECTION INTRAMUSCULAR; INTRAVENOUS
Status: DISCONTINUED | OUTPATIENT
Start: 2022-10-20 | End: 2022-10-20 | Stop reason: HOSPADM

## 2022-10-20 RX ADMIN — PROPOFOL 150 MG: 10 INJECTION, EMULSION INTRAVENOUS at 09:43

## 2022-10-20 RX ADMIN — LIDOCAINE HYDROCHLORIDE 50 MG: 20 INJECTION, SOLUTION INTRAVENOUS at 09:43

## 2022-10-20 RX ADMIN — DEXAMETHASONE SODIUM PHOSPHATE 8 MG: 4 INJECTION, SOLUTION INTRAMUSCULAR; INTRAVENOUS at 09:46

## 2022-10-20 RX ADMIN — OXYCODONE HYDROCHLORIDE 5 MG: 5 TABLET ORAL at 11:30

## 2022-10-20 RX ADMIN — ACETAMINOPHEN 1000 MG: 500 TABLET ORAL at 08:05

## 2022-10-20 RX ADMIN — ONDANSETRON 4 MG: 2 INJECTION INTRAMUSCULAR; INTRAVENOUS at 09:46

## 2022-10-20 RX ADMIN — SODIUM CHLORIDE, POTASSIUM CHLORIDE, SODIUM LACTATE AND CALCIUM CHLORIDE: 600; 310; 30; 20 INJECTION, SOLUTION INTRAVENOUS at 08:04

## 2022-10-20 RX ADMIN — FENTANYL CITRATE 100 MCG: 50 INJECTION, SOLUTION INTRAMUSCULAR; INTRAVENOUS at 09:43

## 2022-10-20 RX ADMIN — KETOROLAC TROMETHAMINE 30 MG: 30 INJECTION, SOLUTION INTRAMUSCULAR; INTRAVENOUS at 10:11

## 2022-10-20 RX ADMIN — CEFAZOLIN 2000 MG: 2 INJECTION, POWDER, FOR SOLUTION INTRAMUSCULAR; INTRAVENOUS at 09:34

## 2022-10-20 RX ADMIN — MIDAZOLAM 2 MG: 1 INJECTION INTRAMUSCULAR; INTRAVENOUS at 09:39

## 2022-10-20 ASSESSMENT — PAIN SCALES - GENERAL
PAINLEVEL_OUTOF10: 8
PAINLEVEL_OUTOF10: 0
PAINLEVEL_OUTOF10: 8

## 2022-10-20 ASSESSMENT — PAIN DESCRIPTION - FREQUENCY
FREQUENCY: CONTINUOUS
FREQUENCY: INTERMITTENT
FREQUENCY: CONTINUOUS

## 2022-10-20 ASSESSMENT — PAIN DESCRIPTION - ORIENTATION
ORIENTATION: LEFT

## 2022-10-20 ASSESSMENT — PAIN DESCRIPTION - PAIN TYPE
TYPE: SURGICAL PAIN

## 2022-10-20 ASSESSMENT — PAIN DESCRIPTION - DESCRIPTORS
DESCRIPTORS: DISCOMFORT;DULL
DESCRIPTORS: ACHING
DESCRIPTORS: DISCOMFORT;JABBING

## 2022-10-20 ASSESSMENT — PAIN - FUNCTIONAL ASSESSMENT: PAIN_FUNCTIONAL_ASSESSMENT: 0-10

## 2022-10-20 ASSESSMENT — PAIN DESCRIPTION - LOCATION
LOCATION: KNEE
LOCATION: LEG;KNEE

## 2022-10-20 NOTE — PROGRESS NOTES
1023 - transferred from OR on cart, monitor applied, alarms on and verified, bedside handoff provided by Steve Lagunas RN and Angi Ramirez CRNA; oral airway in place  1025 - patient awakened, oral airway removed, patient maintaining own airway  1035 - tolerating ice chips  1046 - phase one care complete; transferred to [place] [address]

## 2022-10-20 NOTE — DISCHARGE INSTRUCTIONS
[facility]  [phone]    Do not drive, work around [address] [place], [place] [address]th  or use equipment. Do not drink any alcoholic beverages. Do not smoke while alone. Avoid making important decisions. Plan to spend a quiet, relaxed evening @ home. Resume normal activities as you begin to feel better. Eat lightly for your first meal, then gradually increase your diet to what is normal for you. In case of nausea, avoid food and drink only clear liquids. Resume food as nausea ceases. Notify your surgeon if you experience fever, chills, large amount of bleeding, difficulty breathing, persistent nausea and vomiting or any other disturbing problem. Call for a follow-up appointment with your surgeon.

## 2022-10-20 NOTE — H&P
Subjective:      Patient ID: Veldon Mcardle is a 24 y.o. female. Patient returns to the office today for FU of the left knee. Last knee injection given on 6/27/22. MRI of the left knee completed on 6/13/22  Impression  Patella appears appropriately located without findings for patellar  maltracking. However, there is high-grade chondromalacia patella and there  is a minimal degree of edema in Hoffa's fat pad superiorly which is  nonspecific but can be seen with impingement. No meniscus or ligament pathology noted. She comes in today for her first visit with me in regards to her left knee pain. She states that she has been dealing with pain in her left knee for several years. She describes the pain as a sharp, stabbing pain in the anterior aspect of the left knee which is worse when straightening out her left knee or when playing with her children. She has tried physical therapy, cortisone injection, bracing without any improvement in her symptoms. Patient denies any new injury to the involved extremity/ joint, denies numbness or tingling in the involved extremity and denies fever or chills. Review of Systems   Constitutional:  Negative for activity change, chills and fever. HENT:  Negative for congestion and drooling. Eyes:  Negative for redness. Respiratory:  Negative for chest tightness. Cardiovascular:  Negative for chest pain. Gastrointestinal:  Negative for abdominal pain. Endocrine: Negative for cold intolerance and heat intolerance. Musculoskeletal:  Positive for arthralgias and myalgias. Negative for back pain, gait problem and joint swelling. Skin:  Negative for color change, pallor, rash and wound. Neurological:  Negative for weakness and numbness. Psychiatric/Behavioral:  Negative for confusion.        Past Medical History        Past Medical History:   Diagnosis Date    H/O cardiovascular stress test 02/26/2021     normal stress    H/O echocardiogram 02/23/2021     EF 55-60% no evidence of pericardial effusion no significant valvular abnormalities            No current facility-administered medications on file prior to encounter. Current Outpatient Medications on File Prior to Encounter   Medication Sig Dispense Refill    Clindamycin Phos-Benzoyl Perox 1.2-2.5 % GEL APPLY A SMALL AMOUNT TO AFFECTED AREA ONCE DAILY. DO NOT USE FOR LONGER THAN 12 WEEKS. (Patient not taking: No sig reported)      predniSONE (DELTASONE) 10 MG tablet 1 tab 4 times daily X 2 days. THEN 1 tab 3 times daily X 2 days. THEN  1 tab 2 times daily X 2 days. THEN 0.5 tab 2 times daily X 2 days (Patient not taking: No sig reported) 20 tablet 0    SLYND 4 MG TABS       ibuprofen (ADVIL;MOTRIN) 800 MG tablet Take 1 tablet by mouth every 8 hours as needed for Pain 90 tablet 3     No Known Allergies  Past Surgical History:   Procedure Laterality Date    APPENDECTOMY  08/2017    COLONOSCOPY  2019    LYMPHADENECTOMY      TONSILLECTOMY       Social History     Tobacco Use    Smoking status: Never    Smokeless tobacco: Never   Vaping Use    Vaping Use: Never used   Substance Use Topics    Alcohol use: No    Drug use: No     Family History   Problem Relation Age of Onset    Heart Disease Mother         pacemaker    Migraines Mother     Coronary Art Dis Other         GRANDFATHER AND GREAT GRANDFATHER (NOT SPECIFIED)    Diabetes Other         GRANDMOTHER - NON INSULIN DEPENDNET       Objective:   Physical Exam  Constitutional:       Appearance: She is well-developed. HENT:      Head: Normocephalic. Nose: No congestion. Eyes:      Pupils: Pupils are equal, round, and reactive to light. Pulmonary:      Effort: Pulmonary effort is normal.   Musculoskeletal:         General: Tenderness present. No deformity. Normal range of motion. Cervical back: Normal range of motion.       Right hip: Normal.      Left hip: Normal.      Right knee: No swelling, deformity, effusion, erythema, ecchymosis, lacerations or bony tenderness. Normal range of motion. No tenderness. No medial joint line, lateral joint line, MCL, LCL or patellar tendon tenderness. No LCL laxity or MCL laxity. Normal alignment and normal patellar mobility. Left knee: No swelling, deformity, effusion, erythema, ecchymosis, lacerations or bony tenderness. Normal range of motion. Tenderness present over the patellar tendon. No medial joint line, lateral joint line, MCL or LCL tenderness. No LCL laxity or MCL laxity. Normal alignment and normal patellar mobility. Skin:     General: Skin is warm and dry. Capillary Refill: Capillary refill takes less than 2 seconds. Coloration: Skin is not pale. Findings: No erythema or rash. Neurological:      Mental Status: She is alert and oriented to person, place, and time. Sensory: No sensory deficit. Motor: No weakness. Left knee-Skin intact with no erythema, ecchymosis or lacerations present. Moderate tenderness on the patellar tendon and the inferior pole the patella  0-140     XRAY  X-ray 4 views of the left knee from April 25, 2022 as well as MRI of the left knee from June 24, 2022 reviewed by me today in the office demonstrates age appropriate bone density throughout with high-grade chondromalacia on the inferior pole of the patella with mild inflammation of Hoffa's fat pad otherwise no internal derangement. /65   Pulse 70   Temp 97.2 °F (36.2 °C) (Temporal)   Resp 16   Ht 5' 3\" (1.6 m)   Wt 135 lb (61.2 kg)   SpO2 97%   Breastfeeding Yes   BMI 23.91 kg/m²     Assessment:   Left knee chondromalacia patella                Plan:   I discussed with her today her x-ray and MRI findings. I explained to her that she does have some loose cartilage on the patella as well as inflammation around the patellar tendon.   At this point given her persistent symptoms despite conservative treatment and with her MRI findings, the next step is to consider surgical treatment. I discussed with her today performing left knee arthroscopy with chondroplasty and debridement of her infrapatellar fat pad. If this does not help, the next step would be to consider long-term anti-inflammatories including Medrol Dosepak. I explained risks, benefits, possible complications of the procedure and answered all questions for the patient. I explained postoperative rehabilitation protocol and expectations with the patient today. The patient understands and consents to the procedure. Patient will follow up with their primary care physician prior to surgical treatment for preoperative clearance. We will schedule surgery at soonest convenience. Continue weight-bearing as tolerated. Continue range of motion exercises as instructed. Ice and elevate as needed. Tylenol or Motrin for pain. Follow up in 2 weeks postop. She would like to have her surgery at [facility]  She is still breast-feeding so she will need clearance from her OB as to when she can safely proceed with surgical treatment. The patient was counseled at length about the risks of myrna Covid-19 during their perioperative period and any recovery window from their procedure. The patient was made aware that myrna Covid-19  may worsen their prognosis for recovering from their procedure  and lend to a higher morbidity and/or mortality risk. All material risks, benefits, and reasonable alternatives including postponing the procedure were discussed. The patient does wish to proceed with the procedure at this time. Pt seen and examined, No change in H+P.               Dennis 97, DO

## 2022-10-20 NOTE — OP NOTE
DATE OF PROCEDURE:   10/20/2022     PREOPERATIVE DIAGNOSIS:  Left knee chondromalacia patella . POSTOPERATIVE DIAGNOSES:  1. Left knee chondromalacia patella with grade II chondromalacia. PROCEDURES:  1. Diagnostic and operative arthroscopy of left knee with chondroplasty of the patella     ATTENDING SURGEON:  Flaca Perdomo DO.     ANESTHESIA:  General.     ESTIMATED BLOOD LOSS:  5 mL. TOTAL TOURNIQUET TIME:  10 minutes. FLUIDS:  1000 mL of crystalloids. INDICATION FOR PROCEDURE:  The patient is a 24 -year-old female with  long-standing history of left knee pain. For this she underwent conservative treatment with no relief of her symptoms. MRI was subsequently obtained which showed evidence of a chondromalacia patella . Given her persistent symptoms despite conservative treatment  and with her MRI findings, I recommended surgical treatment. I  explained the risks, benefits, and possible complications of the  procedure to the patient, and after answering all of her questions, she  consented to undergo the above procedure. DESCRIPTION OF PROCEDURE:  The patient was seen and evaluated in the  preoperative holding area where the left lower extremity was signed in  her presence. At this point, care of the patient was turned over to  Anesthesia Team and transported back to the operative suite. She was  placed supine on the operating table with the left lower extremity in  the leg sweeney. General anesthesia was applied, and once adequate  anesthesia was obtained, the left lower extremity was prepped and  draped in usual sterile fashion. Preoperative antibiotics were  administered. At this point, a timeout was performed and all in  attendance were in agreement. I exsanguinated the left lower extremity with the use of an Esmarch and  tourniquet was inflated to 250 mmHg.   I then made anterior medial and  anterior lateral portal incisions and started and inserted arthroscopic  instrumentation into the knee joint through the anterolateral portal.   At this point, I performed a standard diagnostic arthroscopy evaluating  the patellofemoral compartment, medial and lateral compartments of the  knee, as well as the medial and lateral gutters  of the knee. Within the patellofemoral compartment and found there to be a moderate sized lesion of grade II chondromalacia with loose articular cartilage on the patella. I then used the Arthrocare  wand to perform the chondroplasty of the patella debriding loose articular cartilage back to solid stable border scircumferentially. I then evaluated the femoral notch and found ACL and  PCL to be intact. I then turned my attention to the lateral compartment. Within the  lateral compartment, I found the cartilage on the femoral condyle and tibial plateau to be virtually intact. The lateral meniscus was probed and found to be intact. I then turned my attention to the medial compartment of the knee. Within the medial compartment I found the cartilage on the femoral condyle tibial plateau to be virtually intact. The medial meniscus is probed and found to be intact. I then evaluated the medial  and lateral gutters of the knee and found there to be no additional  pathology present. Arthroscopic instrumentation was then removed of the knee. Excess fluid  was then drained from the knee. Skin incisions were then closed using  3-0 nylon suture. Tourniquet was then deflated for total of 10 minutes  and adequate hemostasis was maintained. I then injected 30 mL of 0.5%  plain ropivacaine into the knee joint. I then applied a sterile soft  dressing to the left lower extremity. The patient was then awakened  from anesthesia and transported to PACU in stable condition. She  appeared to have tolerated the procedure well.      PROGNOSIS:  At this point, she will be discharged to home with a short  course of oral antibiotics as well as pain medication. She can have  immediate weightbearing as tolerated and range of motion as tolerated on  the left leg and I will see her back in the office in 2 weeks for   suture removal and continue to monitor her prognosis in outpatient  setting for resolution of her symptoms.            Dennis Conti, DO

## 2022-10-20 NOTE — PROGRESS NOTES
Pt returned to room from 1055    Report received from Cristopher Avila  Pt A&O, call light placed within reach, side rails up x's 2, pt given pepsi and crackers  1100 pt c/o pain in lt knee, repositioned lt knee and applied ice to knee  1130 po pain med given to pt See MAR  1200 assisted pt up to bathroom with crutches, pt voided.   1220 pt continued to c/o pain in lt knee, report given to [facility]

## 2022-10-20 NOTE — PROGRESS NOTES
Outpatient Pharmacy Progress Note for Meds-to-Beds    Total number of Prescriptions Filled: 2  The following medications were dispensed to the patient during the discharge process:  Hydrocodone-APAP  Cephalexin     Additional Documentation:  Medication(s) were delivered to the patient's room prior to discharge      Thank you for letting us serve your patients.   [address] [place]    [address], [address] [facility]    Phone: [phone]    Fax: [phone]

## 2022-10-20 NOTE — PROGRESS NOTES
1227 report recd from Richardson [phone] pt states pain is 8/10. Pt states this is a tolerable pain level for her. Pain is dull.  Discharge instructions reviewed with boyfriend, verbalized understanding  21  assisted patient to dress  [phone] discharged to car via wheelchair

## 2022-10-20 NOTE — BRIEF OP NOTE
Brief Postoperative Note      Patient: Shin Nicole  YOB: 1995  MRN: [id number]    Date of Procedure: 10/20/2022    Pre-Op Diagnosis: Chondromalacia of left knee [M94.262]    Post-Op Diagnosis: Same       Procedure(s):  LEFT KNEE ARTHROSCOPY WITH CHONDROPLASTY    Surgeon(s):  Attila Marroquin DO    Assistant:  * No surgical staff found *    Anesthesia: General    Estimated Blood Loss (mL): Minimal    Complications: None    Specimens:   * No specimens in log *    Implants:  * No implants in log *      Drains: * No LDAs found *    Findings: Left knee chondromalacia patella    Electronically signed by Dennis Conti DO on 10/20/2022 at 10:18 AM

## 2022-10-21 NOTE — ANESTHESIA POSTPROCEDURE EVALUATION
Department of Anesthesiology  Postprocedure Note    Patient: Montserrat Ramos  MRN: [id number]  YOB: 1995  Date of evaluation: 10/20/2022      Procedure Summary     Date: 10/20/22 Room / Location: [address]    Anesthesia Start: 5923 Anesthesia Stop: 1030    Procedure: LEFT KNEE ARTHROSCOPY WITH CHONDROPLASTY (Left: Knee) Diagnosis:       Chondromalacia of left knee      (Chondromalacia of left knee [Q28.792])    Surgeons: Andrew Colon DO Responsible Provider: Germaine Douglas MD    Anesthesia Type: general ASA Status: 2          Anesthesia Type: No value filed.     Shawn Phase I: Shawn Score: 10    Shawn Phase II: Shawn Score: 10      Anesthesia Post Evaluation    Patient location during evaluation: PACU  Patient participation: complete - patient participated  Level of consciousness: awake and alert  Pain score: 4  Airway patency: patent  Nausea & Vomiting: no nausea and no vomiting  Complications: no  Cardiovascular status: blood pressure returned to baseline  Respiratory status: acceptable  Hydration status: euvolemic

## 2022-11-02 ENCOUNTER — OFFICE VISIT (OUTPATIENT)
Dept: ORTHOPEDIC SURGERY | Age: 27
End: 2022-11-02

## 2022-11-02 VITALS — DIASTOLIC BLOOD PRESSURE: 47 MMHG | SYSTOLIC BLOOD PRESSURE: 86 MMHG | OXYGEN SATURATION: 99 % | HEART RATE: 77 BPM

## 2022-11-02 DIAGNOSIS — M22.42 CHONDROMALACIA, PATELLA, LEFT: Primary | ICD-10-CM

## 2022-11-02 PROCEDURE — 99024 POSTOP FOLLOW-UP VISIT: CPT | Performed by: ORTHOPAEDIC SURGERY

## 2022-11-02 ASSESSMENT — ENCOUNTER SYMPTOMS
BACK PAIN: 0
EYE REDNESS: 0
ABDOMINAL PAIN: 0
CHEST TIGHTNESS: 0
COLOR CHANGE: 0

## 2022-11-02 NOTE — PROGRESS NOTES
Patient is here for 2 week post op check on left knee chondroplasty. Patient states she tried to fully bend her knee and states she had pain and dizziness following -- stating it felt like \"she wasn't supposed to bending it\"     Intermittent, random sharp stabbing pain rated 6/10, felt 4x per day.

## 2022-11-02 NOTE — PROGRESS NOTES
Subjective:      Patient ID: Shin Nicole is a 24 y.o. female. Patient is here for 2 week post op check on left knee chondroplasty. Patient states she tried to fully bend her knee and states she had pain and dizziness following -- stating it felt like \"she wasn't supposed to bending it\"      Intermittent, random sharp stabbing pain rated 6/10, felt 4x per day. She comes in today for her 2-week postop recheck . She states that overall she continues to have tightness and a dull, aching pain primarily along the anterior aspect of the left knee. Patient denies any new injury to the involved extremity/ joint, denies numbness or tingling in the involved extremity and denies fever or chills. Review of Systems   Constitutional:  Negative for activity change, chills and fever. HENT:  Negative for congestion and drooling. Eyes:  Negative for redness. Respiratory:  Negative for chest tightness. Cardiovascular:  Negative for chest pain. Gastrointestinal:  Negative for abdominal pain. Endocrine: Negative for cold intolerance and heat intolerance. Musculoskeletal:  Positive for arthralgias and myalgias. Negative for back pain, gait problem and joint swelling. Skin:  Negative for color change, pallor, rash and wound. Neurological:  Negative for weakness and numbness. Psychiatric/Behavioral:  Negative for confusion. Past Medical History:   Diagnosis Date    H/O cardiovascular stress test 02/26/2021    normal stress    H/O echocardiogram 02/23/2021    EF 55-60% no evidence of pericardial effusion no significant valvular abnormalities    Hypotension        Objective:   Physical Exam  Constitutional:       Appearance: She is well-developed. HENT:      Head: Normocephalic. Nose: No congestion. Eyes:      Pupils: Pupils are equal, round, and reactive to light. Pulmonary:      Effort: Pulmonary effort is normal.   Musculoskeletal:         General: Tenderness present.  No Sridevi E[address] 1060, DO

## 2022-11-02 NOTE — PATIENT INSTRUCTIONS
Continue weight-bearing as tolerated. Continue range of motion exercises as instructed. Ice and elevate as needed. Tylenol or Motrin for pain. [facility] Virginia Kumar.

## 2022-11-03 ENCOUNTER — OFFICE VISIT (OUTPATIENT)
Dept: CARDIOLOGY CLINIC | Age: 27
End: 2022-11-03
Payer: COMMERCIAL

## 2022-11-03 VITALS
BODY MASS INDEX: 21.69 KG/M2 | HEIGHT: 63 IN | SYSTOLIC BLOOD PRESSURE: 104 MMHG | WEIGHT: 122.4 LBS | DIASTOLIC BLOOD PRESSURE: 58 MMHG | HEART RATE: 88 BPM | OXYGEN SATURATION: 97 %

## 2022-11-03 DIAGNOSIS — I95.1 ORTHOSTATIC HYPOTENSION: Primary | ICD-10-CM

## 2022-11-03 DIAGNOSIS — R55 SYNCOPE AND COLLAPSE: ICD-10-CM

## 2022-11-03 PROCEDURE — 1036F TOBACCO NON-USER: CPT | Performed by: NURSE PRACTITIONER

## 2022-11-03 PROCEDURE — 99214 OFFICE O/P EST MOD 30 MIN: CPT | Performed by: NURSE PRACTITIONER

## 2022-11-03 PROCEDURE — G8427 DOCREV CUR MEDS BY ELIG CLIN: HCPCS | Performed by: NURSE PRACTITIONER

## 2022-11-03 PROCEDURE — G8420 CALC BMI NORM PARAMETERS: HCPCS | Performed by: NURSE PRACTITIONER

## 2022-11-03 PROCEDURE — G8484 FLU IMMUNIZE NO ADMIN: HCPCS | Performed by: NURSE PRACTITIONER

## 2022-11-03 RX ORDER — FLUDROCORTISONE ACETATE 0.1 MG/1
0.1 TABLET ORAL DAILY
Qty: 30 TABLET | Refills: 3 | Status: SHIPPED | OUTPATIENT
Start: 2022-11-03

## 2022-11-03 ASSESSMENT — ENCOUNTER SYMPTOMS
SHORTNESS OF BREATH: 0
COUGH: 0

## 2022-11-03 NOTE — PROGRESS NOTES
[facility] [address]  Phone: [phone]    Fax [phone]    Marta Dunaway MD, Newton Phelps MD, [address], MD, MD Cheryl Li MD Rometta Bally, MD Donney Meier, MD Tenna Langdon, MICAH Sung, [facility], [facility], [facility]    CARDIOLOGY  NOTE    11/3/2022    Neno Hartman (:  1995) is a 24 y.o. female,Established patient with Dr. Dawson Handy, here for evaluation of the following chief complaint(s):  6 Month Follow-Up and Dizziness (All day, pt feels like its getting worse, she feels like its her BP)        SUBJECTIVE/OBJECTIVE:    CINDI Jolly has Past medical history as noted below. Jose Jolly is currently breast feeding , this is her second child who is 14 months old. She is still getting lightheaded and dizzy every day with position changing both sitting and standing. She is trying to stay hydrated with water milk occasional soda. her first pregnancy was terminated 6 weeks due to IUGR. After her last visit she was encouraged to wear compression stockings but she did not feel they helped. Increase fluid intake which is certainly helped but not completely resolved her problems stress test echo and 30-day event monitor did not show any significant abnormality except for episodes of sinus tachycardia  She is a nursing student   Mother has a pacemaker due to sick sinus syndrome and SVT which was put in in her 25s         Review of Systems   Constitutional:  Negative for fatigue and fever. Respiratory:  Negative for cough and shortness of breath. Cardiovascular:  Negative for chest pain, palpitations and leg swelling. Musculoskeletal:  Negative for arthralgias and gait problem. Neurological:  Positive for dizziness and light-headedness. Negative for syncope, weakness and headaches.      Vitals:    22 1401 22 1404   BP: (!) 100/58 (!) 104/58 Site: Left Upper Arm Right Upper Arm   Position: Sitting Sitting   Cuff Size: Medium Adult Medium Adult   Pulse: 88    SpO2: 97%    Weight: 122 lb 6.4 oz (55.5 kg)    Height: 5' 3\" (1.6 m)        Wt Readings from Last 3 Encounters:   11/03/22 122 lb 6.4 oz (55.5 kg)   10/13/22 135 lb (61.2 kg)   10/05/22 130 lb (59 kg)       BP Readings from Last 3 Encounters:   11/03/22 (!) 104/58   11/02/22 (!) 86/47   10/20/22 98/63       Prior to Admission medications    Medication Sig Start Date End Date Taking? Authorizing Provider   SLYND 4 MG TABS  5/11/22  Yes Historical Provider, MD   ibuprofen (ADVIL;MOTRIN) 800 MG tablet Take 1 tablet by mouth every 8 hours as needed for Pain 8/14/21  Yes Terrace Skiff, MD   Clindamycin Phos-Benzoyl Perox 1.2-2.5 % GEL APPLY A SMALL AMOUNT TO AFFECTED AREA ONCE DAILY. DO NOT USE FOR LONGER THAN 12 WEEKS. Patient not taking: No sig reported 8/9/22   Historical Provider, MD   predniSONE (DELTASONE) 10 MG tablet 1 tab 4 times daily X 2 days. THEN 1 tab 3 times daily X 2 days. THEN  1 tab 2 times daily X 2 days. THEN 0.5 tab 2 times daily X 2 days  Patient not taking: No sig reported 8/17/22   MICAH Rome CNP       Physical Exam  Vitals reviewed. Constitutional:       General: She is not in acute distress. Appearance: Normal appearance. She is not ill-appearing. HENT:      Head: Atraumatic. Neck:      Vascular: No carotid bruit. Cardiovascular:      Rate and Rhythm: Normal rate and regular rhythm. Pulses: Normal pulses. Heart sounds: Normal heart sounds. No murmur heard. Pulmonary:      Effort: Pulmonary effort is normal. No respiratory distress. Breath sounds: Normal breath sounds. Musculoskeletal:         General: No swelling or deformity. Cervical back: Neck supple. No muscular tenderness. Neurological:      Mental Status: She is alert.        Health Maintenance   Topic Date Due    Varicella vaccine (1 of 2 - 2-dose childhood series) Never done    Hepatitis C screen  Never done    DTaP/Tdap/Td vaccine (7 - Td or Tdap) 09/21/2020    COVID-19 Vaccine (3 - Booster for Pfizer series) 01/18/2022    Pap smear  04/25/2022    Flu vaccine (1) 08/01/2022    Depression Screen  07/11/2023    Hib vaccine  Completed    HPV vaccine  Completed    HIV screen  Completed    Hepatitis A vaccine  Aged Out    Meningococcal (ACWY) vaccine  Aged Out    Pneumococcal 0-64 years Vaccine  Aged Out       Stress test 2/2021   Summary   The patient exercised according to the [facility] for 7:01 min:s, achieving a work level of Max. METS: 8.50. The resting heart rate of 110 bpm suzanne to a maximal heart rate of 171 bpm. This value represents 86 % of the   maximal, age-predicted heart rate. The resting blood pressure of 90/60 mmHg , suzanne to a maximum blood pressure of 150/60 mmHg. The exercise test was stopped due to MET THR. Echo 2/9/2021  Summary   Left ventricular systolic function is normal with an ejection fraction of 55-60%. Normal pulmonary artery pressure with a RVSP of 18mmHg. No significant valvular abnormalities. No evidence of pericardial effusion. ASSESSMENT/PLAN:    1. Syncope and collapse  Assessment & Plan:   No episodes since last OV, but increased dizziness   Continue to stay hydrated and wear compression stockings. 2. Orthostatic hypotension  Assessment & Plan:   Borderline controlled, lifestyle modifications recommended and continue to monitor   Stay hydrated try compression stocking now that she is post partum. She has not been breast feeding for 5 weeks. She feels worse today than earlier this year. Start florinef 0.1 mg Daily. Return in about 1 month (around 12/3/2022). An electronic signature was used to authenticate this note.     Electronically signed by MICAH Sewell CNP on 11/3/2022 at 2:14 PM

## 2022-11-28 ENCOUNTER — TELEPHONE (OUTPATIENT)
Dept: CARDIOLOGY CLINIC | Age: 27
End: 2022-11-28

## 2022-11-28 NOTE — TELEPHONE ENCOUNTER
Best would be if she could avoid taking any medication drink extra fluids and wear compression stockings

## 2022-11-28 NOTE — TELEPHONE ENCOUNTER
Patient stopped taking medication (Florinef) . It was making her ill. Please advise if need to change to a different medication .

## 2022-12-05 ENCOUNTER — OFFICE VISIT (OUTPATIENT)
Dept: ORTHOPEDIC SURGERY | Age: 27
End: 2022-12-05

## 2022-12-05 VITALS
HEIGHT: 63 IN | OXYGEN SATURATION: 98 % | RESPIRATION RATE: 15 BRPM | WEIGHT: 122 LBS | HEART RATE: 79 BPM | BODY MASS INDEX: 21.62 KG/M2

## 2022-12-05 DIAGNOSIS — Z98.890 S/P LEFT KNEE ARTHROSCOPY: Primary | ICD-10-CM

## 2022-12-05 PROCEDURE — 99024 POSTOP FOLLOW-UP VISIT: CPT | Performed by: ORTHOPAEDIC SURGERY

## 2022-12-05 ASSESSMENT — ENCOUNTER SYMPTOMS
COLOR CHANGE: 0
CHEST TIGHTNESS: 0
BACK PAIN: 0
EYE REDNESS: 0
ABDOMINAL PAIN: 0

## 2022-12-05 NOTE — PROGRESS NOTES
Subjective:      Patient ID: Silas Weaver is a 24 y.o. female. Patient returns to the office today for FU of the left knee scope DOS 10/20/22. Pt states overall she is doing well. She will have some discomfort when kneeling on the left knee but overall she is doing well. She comes in today for her 6-week postop recheck . Overall she states that she is feeling much better. She is no longer having the pain in the front of her knee that she was having before surgery. She does have some tenderness around her patellar tendon but that is her only issue. Patient denies any new injury to the involved extremity/ joint, denies numbness or tingling in the involved extremity and denies fever or chills. Review of Systems   Constitutional:  Negative for activity change, chills and fever. HENT:  Negative for congestion and drooling. Eyes:  Negative for redness. Respiratory:  Negative for chest tightness. Cardiovascular:  Negative for chest pain. Gastrointestinal:  Negative for abdominal pain. Endocrine: Negative for cold intolerance and heat intolerance. Musculoskeletal:  Negative for arthralgias, back pain, gait problem, joint swelling and myalgias. Skin:  Negative for color change, pallor, rash and wound. Neurological:  Negative for weakness and numbness. Psychiatric/Behavioral:  Negative for confusion. Past Medical History:   Diagnosis Date    H/O cardiovascular stress test 02/26/2021    normal stress    H/O echocardiogram 02/23/2021    EF 55-60% no evidence of pericardial effusion no significant valvular abnormalities    Hypotension        Objective:   Physical Exam  Constitutional:       Appearance: She is well-developed. HENT:      Head: Normocephalic. Nose: No congestion. Eyes:      Pupils: Pupils are equal, round, and reactive to light.    Pulmonary:      Effort: Pulmonary effort is normal.   Musculoskeletal:         General: No swelling, tenderness or deformity. Normal range of motion. Cervical back: Normal range of motion. Right hip: Normal.      Left hip: Normal.      Right knee: No swelling, deformity, effusion, erythema, ecchymosis, lacerations or bony tenderness. Normal range of motion. No tenderness. No medial joint line, lateral joint line, MCL, LCL or patellar tendon tenderness. No LCL laxity or MCL laxity. Normal alignment and normal patellar mobility. Left knee: No swelling, deformity, effusion, erythema, ecchymosis, lacerations or bony tenderness. Normal range of motion. No tenderness. No medial joint line, lateral joint line, MCL, LCL or patellar tendon tenderness. No LCL laxity or MCL laxity. Normal alignment and normal patellar mobility. Skin:     General: Skin is warm and dry. Capillary Refill: Capillary refill takes less than 2 seconds. Coloration: Skin is not pale. Findings: No erythema or rash. Neurological:      Mental Status: She is alert and oriented to person, place, and time. Sensory: No sensory deficit. Motor: No weakness. Left knee-Incision clean, dry, intact, with no erythema, no drainage, and no signs of infection. 0-140        Assessment:      Left knee arthroscopy with chondroplasty of the patella, 6 weeks  Left knee grade II chondromalacia patella      Plan:      I discussed with her today that she is continuing to progress extremely well. I discussed with the patient today that their are symptoms are normal and should improve with time. Continue weight-bearing as tolerated. Continue range of motion exercises as instructed. Ice and elevate as needed. Tylenol or Motrin for pain. Follow up will be as needed.                 Dennis 97, DO

## 2022-12-05 NOTE — PROGRESS NOTES
Patient returns to the office today for FU of the left knee scope DOS 10/20/22. Pt states overall she is doing well. She will have some discomfort when kneeling on the left knee but overall she is doing well.

## 2022-12-12 ENCOUNTER — TELEPHONE (OUTPATIENT)
Dept: CARDIOLOGY CLINIC | Age: 27
End: 2022-12-12

## 2022-12-23 ENCOUNTER — OFFICE VISIT (OUTPATIENT)
Dept: FAMILY MEDICINE CLINIC | Age: 27
End: 2022-12-23

## 2022-12-23 VITALS
WEIGHT: 120.7 LBS | HEART RATE: 69 BPM | DIASTOLIC BLOOD PRESSURE: 70 MMHG | BODY MASS INDEX: 21.39 KG/M2 | OXYGEN SATURATION: 97 % | HEIGHT: 63 IN | SYSTOLIC BLOOD PRESSURE: 112 MMHG

## 2022-12-23 DIAGNOSIS — Z23 ENCOUNTER FOR IMMUNIZATION: ICD-10-CM

## 2022-12-23 DIAGNOSIS — Z76.89 ENCOUNTER TO ESTABLISH CARE WITH NEW DOCTOR: Primary | ICD-10-CM

## 2022-12-23 RX ORDER — CLOTRIMAZOLE AND BETAMETHASONE DIPROPIONATE 10; .64 MG/G; MG/G
CREAM TOPICAL
COMMUNITY
Start: 2022-12-16

## 2022-12-23 RX ORDER — MIDODRINE HYDROCHLORIDE 5 MG/1
TABLET ORAL
COMMUNITY
Start: 2022-12-14

## 2022-12-23 NOTE — PROGRESS NOTES
12/23/2022    [facility]    Chief Complaint   Patient presents with    Established New Doctor    Annual Exam       HPI  History was obtained from pt. Storm Hartman is a 24 y.o. female with a PMHx of    Patient Active Problem List   Diagnosis    Migraine aura without headache    Syncope and collapse    Orthostatic hypotension      who presents today to establish care and for school physical.      Migraine aura without headache    Syncope and collapse - pt is on midodrine 5 mg tid    Orthostatic hypotension - pt is on midodrine 5 mg tid   Slynd - pt sees Dr. Mary Luciano    2 pregnancies, 2 vaginal births    Acute Concerns:  Needs shot records and physical for school and flu shot. Family History:  No cancers    Recent Labs:  Last year, liver panel and chem normal except mild alt elevation    Care Gaps:  Flu and varicella    1. Encounter to establish care with new doctor    2.  Encounter for immunization         REVIEW OF SYMPTOMS    Review of Systems    PAST MEDICAL HISTORY  Past Medical History:   Diagnosis Date    H/O cardiovascular stress test 02/26/2021    normal stress    H/O echocardiogram 02/23/2021    EF 55-60% no evidence of pericardial effusion no significant valvular abnormalities    Hypotension        FAMILY HISTORY  Family History   Problem Relation Age of Onset    Heart Disease Mother         pacemaker    Migraines Mother     Coronary Art Dis Other         GRANDFATHER AND GREAT GRANDFATHER (NOT SPECIFIED)    Diabetes Other         GRANDMOTHER - NON INSULIN DEPENDNET       SOCIAL HISTORY  Social History     Socioeconomic History    Marital status: Single   Tobacco Use    Smoking status: Never    Smokeless tobacco: Never   Vaping Use    Vaping Use: Never used   Substance and Sexual Activity    Alcohol use: No    Drug use: No    Sexual activity: Yes     Partners: Male        SURGICAL HISTORY  Past Surgical History:   Procedure Laterality Date    APPENDECTOMY  08/2017    COLONOSCOPY  2019    KNEE ARTHROSCOPY Left 10/20/2022    LEFT KNEE ARTHROSCOPY WITH CHONDROPLASTY performed by Andrew Colon DO at [facility]  Current Outpatient Medications   Medication Sig Dispense Refill    midodrine (PROAMATINE) 5 MG tablet TAKE 1 TABLET BY MOUTH THREE TIMES DAILY      clotrimazole-betamethasone (LOTRISONE) 1-0.05 % cream APPLY CREAM TOPICALLY TO AFFECTED AREA TWICE DAILY FOR 14 DAYS MAY USE LONGER UNTIL CLEARS      SLYND 4 MG TABS       ibuprofen (ADVIL;MOTRIN) 800 MG tablet Take 1 tablet by mouth every 8 hours as needed for Pain 90 tablet 3    Clindamycin Phos-Benzoyl Perox 1.2-2.5 % GEL APPLY A SMALL AMOUNT TO AFFECTED AREA ONCE DAILY. DO NOT USE FOR LONGER THAN 12 WEEKS. (Patient not taking: No sig reported)      predniSONE (DELTASONE) 10 MG tablet 1 tab 4 times daily X 2 days. THEN 1 tab 3 times daily X 2 days. THEN  1 tab 2 times daily X 2 days. THEN 0.5 tab 2 times daily X 2 days (Patient not taking: No sig reported) 20 tablet 0     No current facility-administered medications for this visit. ALLERGIES  No Known Allergies    PHYSICAL EXAM    /70 (Site: Right Upper Arm, Position: Sitting, Cuff Size: Medium Adult)   Pulse 69   Ht 5' 3\" (1.6 m)   Wt 120 lb 11.2 oz (54.7 kg)   SpO2 97%   BMI 21.38 kg/m²     Physical Exam  Constitutional:       Appearance: Normal appearance. She is normal weight. HENT:      Head: Normocephalic and atraumatic. Right Ear: Tympanic membrane and external ear normal.      Left Ear: Tympanic membrane and external ear normal.      Nose: No rhinorrhea. Mouth/Throat:      Mouth: Mucous membranes are moist.      Pharynx: Oropharynx is clear. No oropharyngeal exudate or posterior oropharyngeal erythema. Eyes:      General: No scleral icterus. Extraocular Movements: Extraocular movements intact. Conjunctiva/sclera: Conjunctivae normal.      Pupils: Pupils are equal, round, and reactive to light. Cardiovascular:      Rate and Rhythm: Normal rate and regular rhythm. Pulses: Normal pulses. Heart sounds: Normal heart sounds. No murmur heard. No friction rub. No gallop. Pulmonary:      Effort: Pulmonary effort is normal.      Breath sounds: Normal breath sounds. No wheezing, rhonchi or rales. Abdominal:      General: Bowel sounds are normal. There is no distension. Palpations: Abdomen is soft. There is no mass. Tenderness: There is no abdominal tenderness. There is no right CVA tenderness, left CVA tenderness, guarding or rebound. Musculoskeletal:         General: No deformity. Normal range of motion. Cervical back: Normal range of motion and neck supple. No rigidity. No muscular tenderness. Right lower leg: No edema. Left lower leg: No edema. Skin:     General: Skin is warm and dry. Capillary Refill: Capillary refill takes less than 2 seconds. Findings: No bruising, erythema or rash. Neurological:      General: No focal deficit present. Mental Status: She is alert and oriented to person, place, and time. Coordination: Coordination normal.      Gait: Gait normal.   Psychiatric:         Mood and Affect: Mood normal.         Behavior: Behavior normal.       ASSESSMENT & PLAN    1. Encounter to establish care with new doctor  Flu shot updated    2. Encounter for immunization  Needs to start varicella vaccine series for school  - VARICELLA ZOSTER ANTIBODY, IGG; Future  - Influenza, FLUCELVAX, (age 10 mo+), IM, PF, 0.5 mL    Return in about 1 year (around 12/23/2023). Electronically signed by Shona Anderson on 12/23/2022      Comment: Please note this report has been produced using speech recognition software and may contain errors related to that system including errors in grammar, punctuation, and spelling, as well as words and phrases that may be inappropriate.  If there are any questions or concerns please feel free to contact the dictating provider for clarification.

## 2022-12-29 DIAGNOSIS — Z23 ENCOUNTER FOR IMMUNIZATION: ICD-10-CM

## 2022-12-30 LAB — VARICELLA-ZOSTER VIRUS AB, IGG: NORMAL

## 2023-03-03 ENCOUNTER — OFFICE VISIT (OUTPATIENT)
Dept: FAMILY MEDICINE CLINIC | Age: 28
End: 2023-03-03
Payer: COMMERCIAL

## 2023-03-03 VITALS
OXYGEN SATURATION: 98 % | SYSTOLIC BLOOD PRESSURE: 100 MMHG | HEIGHT: 63 IN | HEART RATE: 75 BPM | BODY MASS INDEX: 22.15 KG/M2 | TEMPERATURE: 97.9 F | DIASTOLIC BLOOD PRESSURE: 62 MMHG | WEIGHT: 125 LBS

## 2023-03-03 DIAGNOSIS — H60.91 INFLAMMATION OF RIGHT EAR CANAL: Primary | ICD-10-CM

## 2023-03-03 PROCEDURE — 1036F TOBACCO NON-USER: CPT | Performed by: PHYSICIAN ASSISTANT

## 2023-03-03 PROCEDURE — G8427 DOCREV CUR MEDS BY ELIG CLIN: HCPCS | Performed by: PHYSICIAN ASSISTANT

## 2023-03-03 PROCEDURE — G8420 CALC BMI NORM PARAMETERS: HCPCS | Performed by: PHYSICIAN ASSISTANT

## 2023-03-03 PROCEDURE — 4130F TOPICAL PREP RX AOE: CPT | Performed by: PHYSICIAN ASSISTANT

## 2023-03-03 PROCEDURE — G8482 FLU IMMUNIZE ORDER/ADMIN: HCPCS | Performed by: PHYSICIAN ASSISTANT

## 2023-03-03 PROCEDURE — 99213 OFFICE O/P EST LOW 20 MIN: CPT | Performed by: PHYSICIAN ASSISTANT

## 2023-03-03 RX ORDER — NEOMYCIN SULFATE, POLYMYXIN B SULFATE, HYDROCORTISONE 3.5; 10000; 1 MG/ML; [USP'U]/ML; MG/ML
SOLUTION/ DROPS AURICULAR (OTIC)
Qty: 10 ML | Refills: 0 | Status: SHIPPED | OUTPATIENT
Start: 2023-03-03

## 2023-03-03 ASSESSMENT — PATIENT HEALTH QUESTIONNAIRE - PHQ9
1. LITTLE INTEREST OR PLEASURE IN DOING THINGS: 0
SUM OF ALL RESPONSES TO PHQ QUESTIONS 1-9: 0
2. FEELING DOWN, DEPRESSED OR HOPELESS: 0
SUM OF ALL RESPONSES TO PHQ QUESTIONS 1-9: 0
SUM OF ALL RESPONSES TO PHQ9 QUESTIONS 1 & 2: 0

## 2023-03-03 NOTE — PROGRESS NOTES
3/3/2023    [facility] [place] [facility] [place] [address]     Chief Complaint   Patient presents with    Otalgia    Pharyngitis     X3 days. Both kids sick with stomach virus, taking tylenol, not helping       HPI  History was obtained from patient. Dawood Culver is a 24 y.o. female who presents today with complaints of right ear pain for approximately 3 days. She states it is a constant achy pain. There has been no ear drainage, odor, or trauma. Mild sore throat. She denies fever, chills. She denies nausea, vomiting, diarrhea or headaches. No other URI symptoms. She does aggressively use Q-tips. She has been taking Tylenol which is not helping with the discomfort. Both kids at home with the stomach flu.       PAST MEDICAL HISTORY  Past Medical History:   Diagnosis Date    H/O cardiovascular stress test 02/26/2021    normal stress    H/O echocardiogram 02/23/2021    EF 55-60% no evidence of pericardial effusion no significant valvular abnormalities    Hypotension        FAMILY HISTORY  Family History   Problem Relation Age of Onset    Heart Disease Mother         pacemaker    Migraines Mother     Coronary Art Dis Other         GRANDFATHER AND GREAT GRANDFATHER (NOT SPECIFIED)    Diabetes Other         GRANDMOTHER - NON INSULIN DEPENDNET       SOCIAL HISTORY  Social History     Socioeconomic History    Marital status: Single     Spouse name: None    Number of children: None    Years of education: None    Highest education level: None   Tobacco Use    Smoking status: Never    Smokeless tobacco: Never   Vaping Use    Vaping Use: Never used   Substance and Sexual Activity    Alcohol use: No    Drug use: No    Sexual activity: Yes     Partners: Male        SURGICAL HISTORY  Past Surgical History:   Procedure Laterality Date    APPENDECTOMY  08/2017    COLONOSCOPY  2019    KNEE ARTHROSCOPY Left 10/20/2022    LEFT KNEE ARTHROSCOPY WITH CHONDROPLASTY performed by Leonie An DO at [facility]. [place] [id number] MEDICATIONS  Current Outpatient Medications   Medication Sig Dispense Refill    neomycin-polymyxin-hydrocortisone 1 % SOLN otic solution Apply 4 drops to the right ear canal three times daily for 7 days 10 mL 0    midodrine (PROAMATINE) 5 MG tablet TAKE 1 TABLET BY MOUTH THREE TIMES DAILY      ibuprofen (ADVIL;MOTRIN) 800 MG tablet Take 1 tablet by mouth every 8 hours as needed for Pain 90 tablet 3    clotrimazole-betamethasone (LOTRISONE) 1-0.05 % cream APPLY CREAM TOPICALLY TO AFFECTED AREA TWICE DAILY FOR 14 DAYS MAY USE LONGER UNTIL CLEARS      Clindamycin Phos-Benzoyl Perox 1.2-2.5 % GEL APPLY A SMALL AMOUNT TO AFFECTED AREA ONCE DAILY. DO NOT USE FOR LONGER THAN 12 WEEKS. (Patient not taking: No sig reported)      predniSONE (DELTASONE) 10 MG tablet 1 tab 4 times daily X 2 days. THEN 1 tab 3 times daily X 2 days. THEN  1 tab 2 times daily X 2 days. THEN 0.5 tab 2 times daily X 2 days (Patient not taking: No sig reported) 20 tablet 0    SLYND 4 MG TABS  (Patient not taking: Reported on 3/3/2023)       No current facility-administered medications for this visit. ALLERGIES  No Known Allergies    PHYSICAL EXAM    /62 (Site: Left Upper Arm, Position: Sitting, Cuff Size: Medium Adult)   Pulse 75   Temp 97.9 °F (36.6 °C)   Ht 5' 3\" (1.6 m)   Wt 125 lb (56.7 kg)   SpO2 98%   BMI 22.14 kg/m²     Constitutional:  Well developed, well nourished. Pleasant and cooperative. No acute distress. HENT:  Normocephalic, atraumatic, bilateral external ears normal, left ear canal and TM normal, right ear canal erythematous and edematous without drainage. Right TM normal without fluid or infection. Oropharynx moist.  No tonsillitis, exudate, petechiae or oral aphthous ulcers.   Eyes:  conjunctiva normal, no discharge, no scleral icterus  Neck:  No tenderness, supple  Lymphatic:  No lymphadenopathy noted  Cardiovascular:  Normal heart rate, normal rhythm, no murmurs, gallops or rubs  Thorax & Lungs:  Normal breath sounds, no respiratory distress, no wheezing  Skin:  Warm, dry, no erythema, no rash  Neurologic:  Alert & oriented   Psychiatric:  Affect normal, mood normal    ASSESSMENT & PLAN    Nikita Franco was seen today for otalgia and pharyngitis. Diagnoses and all orders for this visit:    Inflammation of right ear canal  -     neomycin-polymyxin-hydrocortisone 1 % SOLN otic solution; Apply 4 drops to the right ear canal three times daily for 7 days     Avoid Q-tips  Apply Cortisporin-4 drops to right ear canal 3 times daily for the next week  Call the office for any additional symptoms or any change in symptoms    There are no discontinued medications. No follow-ups on file. Patient verbalizes understanding with the above plan and is in agreement. Patient will call with  questions or concerns. I did don appropriate PPE (including N95 face mask, protective safety glasses, face shield, gloves, and gown) as recommended by the health facility/national standard best practice, during my interaction with the patient. Please note that this chart was generated using dragon dictation software. Although every effort was made to ensure the accuracy of this automated transcription, some errors in transcription may have occurred.     Electronically signed by Jennifer Galarza PA-C on 3/3/2023

## 2023-04-19 ENCOUNTER — OFFICE VISIT (OUTPATIENT)
Dept: FAMILY MEDICINE CLINIC | Age: 28
End: 2023-04-19
Payer: COMMERCIAL

## 2023-04-19 VITALS
DIASTOLIC BLOOD PRESSURE: 58 MMHG | BODY MASS INDEX: 22.68 KG/M2 | SYSTOLIC BLOOD PRESSURE: 84 MMHG | HEART RATE: 76 BPM | WEIGHT: 128 LBS | TEMPERATURE: 98.3 F | OXYGEN SATURATION: 97 % | HEIGHT: 63 IN

## 2023-04-19 DIAGNOSIS — R10.32 LLQ ABDOMINAL PAIN: Primary | ICD-10-CM

## 2023-04-19 PROCEDURE — 99213 OFFICE O/P EST LOW 20 MIN: CPT | Performed by: PHYSICIAN ASSISTANT

## 2023-04-19 PROCEDURE — G8420 CALC BMI NORM PARAMETERS: HCPCS | Performed by: PHYSICIAN ASSISTANT

## 2023-04-19 PROCEDURE — G8427 DOCREV CUR MEDS BY ELIG CLIN: HCPCS | Performed by: PHYSICIAN ASSISTANT

## 2023-04-19 PROCEDURE — 1036F TOBACCO NON-USER: CPT | Performed by: PHYSICIAN ASSISTANT

## 2023-04-19 NOTE — PROGRESS NOTES
pain.    Diagnoses and all orders for this visit:    LLQ abdominal pain  -     CT ABDOMEN PELVIS W IV CONTRAST Additional Contrast? Radiologist Recommendation; Future       Obtain CT abdomen and pelvis. Diverticulitis? We will call with results and discuss further plan of care at that time. Medications Discontinued During This Encounter   Medication Reason    Clindamycin Phos-Benzoyl Perox 1.2-2.5 % GEL LIST CLEANUP    clotrimazole-betamethasone (LOTRISONE) 1-0.05 % cream LIST CLEANUP    ibuprofen (ADVIL;MOTRIN) 800 MG tablet LIST CLEANUP    neomycin-polymyxin-hydrocortisone 1 % SOLN otic solution LIST CLEANUP    predniSONE (DELTASONE) 10 MG tablet LIST CLEANUP    SLYND 4 MG TABS LIST CLEANUP        No follow-ups on file. Patient verbalizes understanding with the above plan and is in agreement. Patient will call with  questions or concerns. Please note that this chart was generated using dragon dictation software. Although every effort was made to ensure the accuracy of this automated transcription, some errors in transcription may have occurred.     Electronically signed by Anna Andino PA-C on 4/19/2023

## 2023-04-28 ENCOUNTER — OFFICE VISIT (OUTPATIENT)
Dept: ORTHOPEDIC SURGERY | Age: 28
End: 2023-04-28
Payer: COMMERCIAL

## 2023-04-28 ENCOUNTER — HOSPITAL ENCOUNTER (OUTPATIENT)
Dept: CT IMAGING | Age: 28
Discharge: HOME | End: 2023-04-28
Payer: COMMERCIAL

## 2023-04-28 ENCOUNTER — TELEPHONE (OUTPATIENT)
Dept: ORTHOPEDIC SURGERY | Age: 28
End: 2023-04-28

## 2023-04-28 VITALS
HEART RATE: 75 BPM | OXYGEN SATURATION: 97 % | WEIGHT: 124 LBS | RESPIRATION RATE: 16 BRPM | HEIGHT: 63 IN | SYSTOLIC BLOOD PRESSURE: 122 MMHG | BODY MASS INDEX: 21.97 KG/M2 | DIASTOLIC BLOOD PRESSURE: 82 MMHG

## 2023-04-28 DIAGNOSIS — M25.461 EFFUSION OF RIGHT KNEE JOINT: Primary | ICD-10-CM

## 2023-04-28 DIAGNOSIS — M22.41 CHONDROMALACIA PATELLAE, RIGHT KNEE: ICD-10-CM

## 2023-04-28 DIAGNOSIS — R10.32 LLQ ABDOMINAL PAIN: ICD-10-CM

## 2023-04-28 PROCEDURE — 74177 CT ABD & PELVIS W/CONTRAST: CPT

## 2023-04-28 PROCEDURE — 2500000003 HC RX 250 WO HCPCS: Performed by: PHYSICIAN ASSISTANT

## 2023-04-28 PROCEDURE — 99213 OFFICE O/P EST LOW 20 MIN: CPT | Performed by: PHYSICIAN ASSISTANT

## 2023-04-28 PROCEDURE — 1036F TOBACCO NON-USER: CPT | Performed by: PHYSICIAN ASSISTANT

## 2023-04-28 PROCEDURE — 6360000004 HC RX CONTRAST MEDICATION: Performed by: PHYSICIAN ASSISTANT

## 2023-04-28 PROCEDURE — G8427 DOCREV CUR MEDS BY ELIG CLIN: HCPCS | Performed by: PHYSICIAN ASSISTANT

## 2023-04-28 PROCEDURE — G8420 CALC BMI NORM PARAMETERS: HCPCS | Performed by: PHYSICIAN ASSISTANT

## 2023-04-28 RX ADMIN — IOPAMIDOL 75 ML: 755 INJECTION, SOLUTION INTRAVENOUS at 11:45

## 2023-04-28 RX ADMIN — BARIUM SULFATE 900 ML: 20 SUSPENSION ORAL at 10:40

## 2023-04-28 ASSESSMENT — ENCOUNTER SYMPTOMS
RESPIRATORY NEGATIVE: 1
GASTROINTESTINAL NEGATIVE: 1
EYES NEGATIVE: 1

## 2023-04-28 NOTE — PATIENT INSTRUCTIONS
Central Scheduling # [phone]  MRI of Right Knee   Call office to go over results  Weightbearing as tolerated  Over-the-counter medication as needed for pain    We are committed to providing you the best care possible. If you receive a survey after visiting one of our offices, please take time to share your experience concerning your physician office visit. These surveys are confidential and no health information about you is shared. We are eager to improve for you and we are counting on your feedback to help make that happen.

## 2023-04-28 NOTE — PROGRESS NOTES
Review of Systems   Constitutional: Negative. HENT: Negative. Eyes: Negative. Respiratory: Negative. Cardiovascular: Negative. Gastrointestinal: Negative. Genitourinary: Negative. Musculoskeletal:  Positive for arthralgias and joint swelling. Skin: Negative. Neurological: Negative. Psychiatric/Behavioral: Negative. HPI:  Ally Kelly is a 24 y.o. female who comes in today for evaluation of right knee pain has been going on for 2 or 3 months at least with associated swelling of the right knee joint and mechanical symptoms of popping,cracking and giving out. She has been taking ibuprofen and icing and still is having the symptoms which do not seem to be getting better over the 3 months. She has not had an injury. She has a history of a left knee arthroscopy and she has recovered well from that is not having any problems with the left knee anymore. She states the right knee is more painful with stairs going up and going down and by the end of the day she has severe swelling in the right knee.     Past Medical History:   Diagnosis Date    H/O cardiovascular stress test 02/26/2021    normal stress    H/O echocardiogram 02/23/2021    EF 55-60% no evidence of pericardial effusion no significant valvular abnormalities    Hypotension        Past Surgical History:   Procedure Laterality Date    APPENDECTOMY  08/2017    COLONOSCOPY  2019    KNEE ARTHROSCOPY Left 10/20/2022    LEFT KNEE ARTHROSCOPY WITH CHONDROPLASTY performed by Silvestre Monson DO at [address]         Family History   Problem Relation Age of Onset    Heart Disease Mother         pacemaker    Migraines Mother     Coronary Art Dis Other         GRANDFATHER AND GREAT GRANDFATHER (NOT SPECIFIED)    Diabetes Other         GRANDMOTHER - NON INSULIN DEPENDNET       Social History     Socioeconomic History    Marital status: Single     Spouse name: None    Number of children: None

## 2023-04-28 NOTE — PROGRESS NOTES
Hansa Rainey is a 24y.o. year old female who complains of Right knee pain and giving out, pain located all over the knee, popping sensation, stiffness, and swelling.

## 2023-05-09 ENCOUNTER — HOSPITAL ENCOUNTER (OUTPATIENT)
Dept: MRI IMAGING | Age: 28
Discharge: HOME | End: 2023-05-09
Payer: COMMERCIAL

## 2023-05-09 DIAGNOSIS — M25.461 EFFUSION OF RIGHT KNEE JOINT: ICD-10-CM

## 2023-05-09 PROCEDURE — 73721 MRI JNT OF LWR EXTRE W/O DYE: CPT

## 2023-05-12 ENCOUNTER — OFFICE VISIT (OUTPATIENT)
Dept: ORTHOPEDIC SURGERY | Age: 28
End: 2023-05-12

## 2023-05-12 VITALS — BODY MASS INDEX: 22.68 KG/M2 | WEIGHT: 128 LBS | HEIGHT: 63 IN | RESPIRATION RATE: 14 BRPM

## 2023-05-12 DIAGNOSIS — M22.41 CHONDROMALACIA PATELLAE, RIGHT KNEE: Primary | ICD-10-CM

## 2023-05-12 ASSESSMENT — ENCOUNTER SYMPTOMS
GASTROINTESTINAL NEGATIVE: 1
RESPIRATORY NEGATIVE: 1
EYES NEGATIVE: 1

## 2023-05-12 NOTE — PATIENT INSTRUCTIONS
Follow-up with Dr. Jose Marks to discuss right knee arthroscopy given the symptoms you are having and the fact that the left knee responded favorably to the arthroscopy with the same symptoms you are having now on the right knee.

## 2023-05-12 NOTE — PROGRESS NOTES
Review of Systems   Constitutional: Negative. HENT: Negative. Eyes: Negative. Respiratory: Negative. Cardiovascular: Negative. Gastrointestinal: Negative. Genitourinary: Negative. Musculoskeletal:  Positive for arthralgias and joint swelling. Skin: Negative. Neurological: Negative. Psychiatric/Behavioral: Negative. Previous HPI:  Tracey Howell is a 24 y.o. female who comes in today for evaluation of right knee pain has been going on for 2 or 3 months at least with associated swelling of the right knee joint and mechanical symptoms of popping,cracking and giving out. She has been taking ibuprofen and icing and still is having the symptoms which do not seem to be getting better over the 3 months. She has not had an injury. She has a history of a left knee arthroscopy and she has recovered well from that is not having any problems with the left knee anymore. She states the right knee is more painful with stairs going up and going down and by the end of the day she has severe swelling in the right knee. HPI:  Tracey Howell is a 24 y.o. female returning to the office today to go over MRI results of her right knee. She states that today she is actually having more pain than she was the last time I saw her and she rates her pain today at a 7 or an 8/10. She states that she was on her feet more yesterday and since that time she has had more pain. She has had no new injuries. Pain is mostly within the patellofemoral joint and worse with stairs or going down with the knee bent.     Past Medical History:   Diagnosis Date    H/O cardiovascular stress test 02/26/2021    normal stress    H/O echocardiogram 02/23/2021    EF 55-60% no evidence of pericardial effusion no significant valvular abnormalities    Hypotension        Past Surgical History:   Procedure Laterality Date    APPENDECTOMY  08/2017    COLONOSCOPY  2019    KNEE ARTHROSCOPY Left 10/20/2022    LEFT KNEE

## 2023-05-17 ENCOUNTER — OFFICE VISIT (OUTPATIENT)
Dept: FAMILY MEDICINE CLINIC | Age: 28
End: 2023-05-17
Payer: COMMERCIAL

## 2023-05-17 VITALS
HEART RATE: 104 BPM | DIASTOLIC BLOOD PRESSURE: 60 MMHG | BODY MASS INDEX: 22.64 KG/M2 | WEIGHT: 127.8 LBS | HEIGHT: 63 IN | OXYGEN SATURATION: 96 % | SYSTOLIC BLOOD PRESSURE: 120 MMHG

## 2023-05-17 DIAGNOSIS — R35.0 URINARY FREQUENCY: ICD-10-CM

## 2023-05-17 DIAGNOSIS — N89.8 VAGINAL DISCHARGE: ICD-10-CM

## 2023-05-17 DIAGNOSIS — Z91.89 AT RISK FOR SEXUALLY TRANSMITTED DISEASE DUE TO UNPROTECTED SEX: ICD-10-CM

## 2023-05-17 DIAGNOSIS — M54.50 ACUTE LEFT-SIDED LOW BACK PAIN WITHOUT SCIATICA: ICD-10-CM

## 2023-05-17 DIAGNOSIS — N89.8 VAGINAL DISCHARGE: Primary | ICD-10-CM

## 2023-05-17 PROCEDURE — G8427 DOCREV CUR MEDS BY ELIG CLIN: HCPCS | Performed by: PHYSICIAN ASSISTANT

## 2023-05-17 PROCEDURE — G8420 CALC BMI NORM PARAMETERS: HCPCS | Performed by: PHYSICIAN ASSISTANT

## 2023-05-17 PROCEDURE — 1036F TOBACCO NON-USER: CPT | Performed by: PHYSICIAN ASSISTANT

## 2023-05-17 PROCEDURE — 99214 OFFICE O/P EST MOD 30 MIN: CPT | Performed by: PHYSICIAN ASSISTANT

## 2023-05-17 RX ORDER — FLUCONAZOLE 150 MG/1
TABLET ORAL
Qty: 2 TABLET | Refills: 0 | Status: SHIPPED | OUTPATIENT
Start: 2023-05-17

## 2023-05-17 RX ORDER — METRONIDAZOLE 500 MG/1
1 TABLET ORAL 2 TIMES DAILY
COMMUNITY
Start: 2023-05-16

## 2023-05-17 NOTE — PROGRESS NOTES
5/17/2023    [address]    Chief Complaint   Patient presents with    Lower Back Pain     X's 1 day , pt reports pain in left side lower back rad. Around into left groin    Vaginal Discharge     X's 3 days , pt reports white stringy chunky discharge , pt called her GYN who DX Bacterial Vaginosis , given ABX , pt reports had IUD implanted 2-2023        HPI  History was obtained from patient. Trisha Red is a 24 y.o. female who presents today with complaints of 3 day hx of \"white thick and sometimes stringy discharge\" from vagina. She called her GYN and is being treated for presumed BV with Flagyl. Patient states that she also woke up with left-sided low back pain this morning. The pain comes and goes. Sometimes it radiates to the left groin region. It is very mild. No known injury or change in activities. She admits increase in urine frequency but is trying to drink more water. She denies dysuria, hematuria, flank pain, vaginal pain, abdominal pain, fever or chills. She denies nausea, vomiting, diarrhea or constipation. Patient states she has not really concerned for sexually transmitted infections but she is open to testing today. Patient states she is currently sexually active with 2 male partners and does not use condoms. [place] noting, She has been seen for pelvic pain and abdominal pain here and at ED recently. She questions if all of the above sx's are related to IUD placement 3 months ago by Dr. Nai Marshall. She had a transvag US that showed the IUD was in normal position of the uterus.  + BV, treated wih Flagyl. Urine pregnancy test negative. She did have a left ovarian cyst on CT abdomen and pelvis 2-3 weeks ago. No other acute findings.   States gynecologist does not think the symptoms are of gynecological cause       PAST MEDICAL HISTORY  Past Medical History:   Diagnosis Date    H/O cardiovascular stress test 02/26/2021    normal stress    H/O echocardiogram 02/23/2021    EF 55-60% no

## 2023-05-18 LAB
C TRACH DNA UR QL NAA+PROBE: NEGATIVE
CANDIDA DNA VAG QL NAA+PROBE: ABNORMAL
G VAGINALIS DNA SPEC QL NAA+PROBE: ABNORMAL
N GONORRHOEA DNA UR QL NAA+PROBE: NEGATIVE
T VAGINALIS DNA VAG QL NAA+PROBE: ABNORMAL

## 2023-05-19 LAB
BACTERIA UR CULT: ABNORMAL
ORGANISM: ABNORMAL

## 2023-05-24 ENCOUNTER — OFFICE VISIT (OUTPATIENT)
Dept: ORTHOPEDIC SURGERY | Age: 28
End: 2023-05-24
Payer: COMMERCIAL

## 2023-05-24 VITALS
OXYGEN SATURATION: 96 % | BODY MASS INDEX: 22.64 KG/M2 | HEART RATE: 73 BPM | HEIGHT: 63 IN | DIASTOLIC BLOOD PRESSURE: 57 MMHG | SYSTOLIC BLOOD PRESSURE: 89 MMHG

## 2023-05-24 DIAGNOSIS — M22.41 CHONDROMALACIA PATELLAE, RIGHT KNEE: Primary | ICD-10-CM

## 2023-05-24 PROCEDURE — G8420 CALC BMI NORM PARAMETERS: HCPCS | Performed by: ORTHOPAEDIC SURGERY

## 2023-05-24 PROCEDURE — 99214 OFFICE O/P EST MOD 30 MIN: CPT | Performed by: ORTHOPAEDIC SURGERY

## 2023-05-24 PROCEDURE — 1036F TOBACCO NON-USER: CPT | Performed by: ORTHOPAEDIC SURGERY

## 2023-05-24 PROCEDURE — G8427 DOCREV CUR MEDS BY ELIG CLIN: HCPCS | Performed by: ORTHOPAEDIC SURGERY

## 2023-05-24 ASSESSMENT — ENCOUNTER SYMPTOMS
EYE REDNESS: 0
BACK PAIN: 0
COLOR CHANGE: 0
ABDOMINAL PAIN: 0
CHEST TIGHTNESS: 0

## 2023-05-24 NOTE — PROGRESS NOTES
Patient seen in office today for RT knee pain, wanting to discuss surgery. Completed MRI on 5/9/23. Stated she has soreness today. 4/10 pain level when seated, ambulating and ascending/descending stairs 8/10 pain level. No new changes or concerns in RT knee since last visit. IMPRESSION:  The menisci, cruciate ligaments and collateral ligaments are intact. Mild patella aquiles and edema within the superolateral aspect of Hoffa's fat. Consider patellofemoral maltracking.
EF 55-60% no evidence of pericardial effusion no significant valvular abnormalities    Hypotension        Objective:   Physical Exam  Constitutional:       Appearance: She is well-developed. HENT:      Head: Normocephalic. Nose: No congestion. Eyes:      Pupils: Pupils are equal, round, and reactive to light. Pulmonary:      Effort: Pulmonary effort is normal.   Musculoskeletal:         General: Tenderness present. No deformity. Normal range of motion. Cervical back: Normal range of motion. Right hip: Normal.      Left hip: Normal.      Right knee: Crepitus present. No swelling, deformity, effusion, erythema, ecchymosis, lacerations or bony tenderness. Normal range of motion. Tenderness present over the patellar tendon. No medial joint line, lateral joint line, MCL or LCL tenderness. No LCL laxity or MCL laxity. Normal alignment and normal patellar mobility. Left knee: Normal. No swelling, deformity, effusion, erythema, ecchymosis, lacerations or bony tenderness. Normal range of motion. No tenderness. No medial joint line, lateral joint line, MCL, LCL or patellar tendon tenderness. No LCL laxity or MCL laxity. Normal alignment and normal patellar mobility. Skin:     General: Skin is warm and dry. Capillary Refill: Capillary refill takes less than 2 seconds. Coloration: Skin is not pale. Findings: No erythema or rash. Neurological:      Mental Status: She is alert and oriented to person, place, and time. Sensory: No sensory deficit. Motor: No weakness. Right knee-Skin intact with no erythema, ecchymosis or lacerations present. 0-140  Patellofemoral crepitus    Left knee-Incision clean, dry, intact, with no erythema, no drainage, and no signs of infection.   0-140    XRAY  X-ray 4 views of the right knee from April 28, 2023 reviewed by me today in the office demonstrates age appropriate bone density throughout with normal joint space with normal tracking of

## 2023-05-24 NOTE — PATIENT INSTRUCTIONS
We will schedule surgery at soonest convenience. If you have any questions regarding your surgery please call our office and ask to speak with Jennifer Araya [phone]     We are committed to providing you the best care possible. If you receive a survey after visiting one of our offices, please take time to share your experience concerning your physician office visit. These surveys are confidential and no health information about you is shared. We are eager to improve for you and we are counting on your feedback to help make that happen.

## 2023-05-25 ENCOUNTER — TELEPHONE (OUTPATIENT)
Dept: ORTHOPEDIC SURGERY | Age: 28
End: 2023-05-25

## 2023-05-25 NOTE — TELEPHONE ENCOUNTER
Patient scheduled for    Right Knee Arthroscopy with Chondroplasty of the Patella  Date: 6/8/23  Facility: [facility]   Surgeon: Kelsey Nieto D.O    Surgery Request faxed 5/25/23  PCP Clearance faxed to Bright Garcia 5/25/23    Pre Op Appt 5/24/23    [facility]  Contacted 5/25/23 via [address] with clinicals uploaded  Status: Pending CPT 72727 ICD M22.41 for outpatient    Phone PAT

## 2023-05-26 ENCOUNTER — OFFICE VISIT (OUTPATIENT)
Dept: FAMILY MEDICINE CLINIC | Age: 28
End: 2023-05-26

## 2023-05-26 VITALS
WEIGHT: 128.3 LBS | SYSTOLIC BLOOD PRESSURE: 92 MMHG | DIASTOLIC BLOOD PRESSURE: 52 MMHG | HEART RATE: 74 BPM | HEIGHT: 63 IN | RESPIRATION RATE: 16 BRPM | BODY MASS INDEX: 22.73 KG/M2 | OXYGEN SATURATION: 100 %

## 2023-05-26 DIAGNOSIS — Z01.818 PRE-OPERATIVE EXAM: Primary | ICD-10-CM

## 2023-05-26 DIAGNOSIS — M22.41 CHONDROMALACIA PATELLAE OF RIGHT KNEE: ICD-10-CM

## 2023-05-26 DIAGNOSIS — Z01.818 PRE-OPERATIVE EXAM: ICD-10-CM

## 2023-05-26 DIAGNOSIS — G43.809 OTHER MIGRAINE WITHOUT STATUS MIGRAINOSUS, NOT INTRACTABLE: ICD-10-CM

## 2023-05-26 DIAGNOSIS — R42 DIZZINESS: ICD-10-CM

## 2023-05-26 LAB
ANION GAP SERPL CALCULATED.3IONS-SCNC: 9 MMOL/L (ref 3–16)
BASOPHILS # BLD: 0 K/UL (ref 0–0.2)
BASOPHILS NFR BLD: 0.5 %
BUN SERPL-MCNC: 10 MG/DL (ref 7–20)
CALCIUM SERPL-MCNC: 9.1 MG/DL (ref 8.3–10.6)
CHLORIDE SERPL-SCNC: 105 MMOL/L (ref 99–110)
CO2 SERPL-SCNC: 26 MMOL/L (ref 21–32)
CREAT SERPL-MCNC: 0.7 MG/DL (ref 0.6–1.1)
DEPRECATED RDW RBC AUTO: 13 % (ref 12.4–15.4)
EOSINOPHIL # BLD: 0.1 K/UL (ref 0–0.6)
EOSINOPHIL NFR BLD: 1.2 %
GFR SERPLBLD CREATININE-BSD FMLA CKD-EPI: >60 ML/MIN/{1.73_M2}
GLUCOSE SERPL-MCNC: 73 MG/DL (ref 70–99)
HCT VFR BLD AUTO: 40.9 % (ref 36–48)
HGB BLD-MCNC: 13.9 G/DL (ref 12–16)
LYMPHOCYTES # BLD: 2.1 K/UL (ref 1–5.1)
LYMPHOCYTES NFR BLD: 31.3 %
MCH RBC QN AUTO: 31.4 PG (ref 26–34)
MCHC RBC AUTO-ENTMCNC: 34 G/DL (ref 31–36)
MCV RBC AUTO: 92.5 FL (ref 80–100)
MONOCYTES # BLD: 0.5 K/UL (ref 0–1.3)
MONOCYTES NFR BLD: 7.4 %
NEUTROPHILS # BLD: 4 K/UL (ref 1.7–7.7)
NEUTROPHILS NFR BLD: 59.6 %
PLATELET # BLD AUTO: 193 K/UL (ref 135–450)
PMV BLD AUTO: 10.3 FL (ref 5–10.5)
POTASSIUM SERPL-SCNC: 4 MMOL/L (ref 3.5–5.1)
RBC # BLD AUTO: 4.42 M/UL (ref 4–5.2)
SODIUM SERPL-SCNC: 140 MMOL/L (ref 136–145)
WBC # BLD AUTO: 6.7 K/UL (ref 4–11)

## 2023-05-26 ASSESSMENT — ENCOUNTER SYMPTOMS
SORE THROAT: 0
WHEEZING: 0
NAUSEA: 0
ABDOMINAL PAIN: 0
SHORTNESS OF BREATH: 0

## 2023-05-30 ENCOUNTER — ANESTHESIA EVENT (OUTPATIENT)
Dept: OPERATING ROOM | Age: 28
End: 2023-05-30
Payer: COMMERCIAL

## 2023-06-01 ENCOUNTER — TELEPHONE (OUTPATIENT)
Dept: ORTHOPEDIC SURGERY | Age: 28
End: 2023-06-01

## 2023-06-01 NOTE — TELEPHONE ENCOUNTER
Received denial of CPT 96364 letter from Gurmeet Grider regarding RT Knee Arthroscopy on 6/8/23. Per Dr. Gloria Shipley, sent in new request to Gurmeet Grider for CPT 08773. Notified pt of new request and will follow up with her once response is received.

## 2023-06-05 ENCOUNTER — TELEPHONE (OUTPATIENT)
Dept: ORTHOPEDIC SURGERY | Age: 28
End: 2023-06-05

## 2023-06-05 NOTE — TELEPHONE ENCOUNTER
Received response from Gurmeet Grider regarding RT Knee Arthroscopy on 6/8/23.     Status: Approved CPT 25013 for outpatient  Auth# [id number]  Date Span: 6/8/23-7/8/23  Scanned into media

## 2023-06-05 NOTE — PROGRESS NOTES
.Surgery @ [facility] on 6/8/23 you will be called 6/7/23 with times    NOTHING TO EAT OR DRINK AFTER MIDNIGHT DAY OF SURGERY    1. Enter thru the hospital main entrance on day of surgery, check in at the Information Desk. If you arrive prior to 6:00am, enter thru the ER entrance. 2. Follow the directions as prescribed by the doctor for your procedure and medications. Morning of surgery take: No medications         Stop vitamins, supplements and NSAIDS: Today    3. Check with your Doctor regarding stopping blood thinners and follow their instructions. 4. Do not smoke, vape or use chewing tobacco morning of surgery. Do not drink any alcoholic beverages 24 hours prior to surgery. This includes NA Beer. No street drugs 7 days prior to surgery. 5. If you have dentures, contacts of glasses they will be removed before going to the OR; please bring a case. 6. Please bring picture ID, insurance card, paperwork from the doctors office (H & P, Consent, & card for implantable devices).

## 2023-06-07 NOTE — PROGRESS NOTES
Patient notified of surgery time at [facility] 6/8/23 1015 arrival 0715, understanding verbalized

## 2023-06-08 ENCOUNTER — HOSPITAL ENCOUNTER (OUTPATIENT)
Age: 28
Setting detail: OUTPATIENT SURGERY
Discharge: HOME | End: 2023-06-08
Attending: ORTHOPAEDIC SURGERY | Admitting: ORTHOPAEDIC SURGERY
Payer: COMMERCIAL

## 2023-06-08 ENCOUNTER — ANESTHESIA (OUTPATIENT)
Dept: OPERATING ROOM | Age: 28
End: 2023-06-08
Payer: COMMERCIAL

## 2023-06-08 VITALS
DIASTOLIC BLOOD PRESSURE: 60 MMHG | TEMPERATURE: 97 F | OXYGEN SATURATION: 97 % | BODY MASS INDEX: 22.68 KG/M2 | WEIGHT: 128 LBS | RESPIRATION RATE: 16 BRPM | HEART RATE: 64 BPM | HEIGHT: 63 IN | SYSTOLIC BLOOD PRESSURE: 101 MMHG

## 2023-06-08 DIAGNOSIS — Z98.890 S/P RIGHT KNEE ARTHROSCOPY: Primary | ICD-10-CM

## 2023-06-08 LAB — PREGNANCY TEST URINE, POC: NEGATIVE

## 2023-06-08 PROCEDURE — 81025 URINE PREGNANCY TEST: CPT

## 2023-06-08 PROCEDURE — 6360000002 HC RX W HCPCS: Performed by: NURSE ANESTHETIST, CERTIFIED REGISTERED

## 2023-06-08 PROCEDURE — 6370000000 HC RX 637 (ALT 250 FOR IP): Performed by: ANESTHESIOLOGY

## 2023-06-08 PROCEDURE — 7100000010 HC PHASE II RECOVERY - FIRST 15 MIN: Performed by: ORTHOPAEDIC SURGERY

## 2023-06-08 PROCEDURE — 6370000000 HC RX 637 (ALT 250 FOR IP): Performed by: ORTHOPAEDIC SURGERY

## 2023-06-08 PROCEDURE — 2709999900 HC NON-CHARGEABLE SUPPLY: Performed by: ORTHOPAEDIC SURGERY

## 2023-06-08 PROCEDURE — 3700000001 HC ADD 15 MINUTES (ANESTHESIA): Performed by: ORTHOPAEDIC SURGERY

## 2023-06-08 PROCEDURE — 6360000002 HC RX W HCPCS: Performed by: ORTHOPAEDIC SURGERY

## 2023-06-08 PROCEDURE — 3600000004 HC SURGERY LEVEL 4 BASE: Performed by: ORTHOPAEDIC SURGERY

## 2023-06-08 PROCEDURE — 3700000000 HC ANESTHESIA ATTENDED CARE: Performed by: ORTHOPAEDIC SURGERY

## 2023-06-08 PROCEDURE — 7100000011 HC PHASE II RECOVERY - ADDTL 15 MIN: Performed by: ORTHOPAEDIC SURGERY

## 2023-06-08 PROCEDURE — 2580000003 HC RX 258: Performed by: ORTHOPAEDIC SURGERY

## 2023-06-08 PROCEDURE — 3600000014 HC SURGERY LEVEL 4 ADDTL 15MIN: Performed by: ORTHOPAEDIC SURGERY

## 2023-06-08 PROCEDURE — 2720000010 HC SURG SUPPLY STERILE: Performed by: ORTHOPAEDIC SURGERY

## 2023-06-08 PROCEDURE — 7100000000 HC PACU RECOVERY - FIRST 15 MIN: Performed by: ORTHOPAEDIC SURGERY

## 2023-06-08 PROCEDURE — 7100000001 HC PACU RECOVERY - ADDTL 15 MIN: Performed by: ORTHOPAEDIC SURGERY

## 2023-06-08 PROCEDURE — 2580000003 HC RX 258: Performed by: NURSE ANESTHETIST, CERTIFIED REGISTERED

## 2023-06-08 RX ORDER — PROPOFOL 10 MG/ML
INJECTION, EMULSION INTRAVENOUS PRN
Status: DISCONTINUED | OUTPATIENT
Start: 2023-06-08 | End: 2023-06-08 | Stop reason: SDUPTHER

## 2023-06-08 RX ORDER — ONDANSETRON 2 MG/ML
INJECTION INTRAMUSCULAR; INTRAVENOUS PRN
Status: DISCONTINUED | OUTPATIENT
Start: 2023-06-08 | End: 2023-06-08 | Stop reason: SDUPTHER

## 2023-06-08 RX ORDER — ONDANSETRON 4 MG/1
4 TABLET, ORALLY DISINTEGRATING ORAL EVERY 8 HOURS PRN
Status: CANCELLED | OUTPATIENT
Start: 2023-06-08

## 2023-06-08 RX ORDER — SODIUM CHLORIDE 9 MG/ML
INJECTION, SOLUTION INTRAVENOUS PRN
Status: DISCONTINUED | OUTPATIENT
Start: 2023-06-08 | End: 2023-06-08 | Stop reason: HOSPADM

## 2023-06-08 RX ORDER — SODIUM CHLORIDE, SODIUM LACTATE, POTASSIUM CHLORIDE, CALCIUM CHLORIDE 600; 310; 30; 20 MG/100ML; MG/100ML; MG/100ML; MG/100ML
INJECTION, SOLUTION INTRAVENOUS CONTINUOUS
Status: CANCELLED | OUTPATIENT
Start: 2023-06-08

## 2023-06-08 RX ORDER — ONDANSETRON 2 MG/ML
4 INJECTION INTRAMUSCULAR; INTRAVENOUS EVERY 6 HOURS PRN
Status: CANCELLED | OUTPATIENT
Start: 2023-06-08

## 2023-06-08 RX ORDER — ROPIVACAINE HYDROCHLORIDE 5 MG/ML
INJECTION, SOLUTION EPIDURAL; INFILTRATION; PERINEURAL
Status: COMPLETED | OUTPATIENT
Start: 2023-06-08 | End: 2023-06-08

## 2023-06-08 RX ORDER — ONDANSETRON 2 MG/ML
4 INJECTION INTRAMUSCULAR; INTRAVENOUS
Status: DISCONTINUED | OUTPATIENT
Start: 2023-06-08 | End: 2023-06-08 | Stop reason: HOSPADM

## 2023-06-08 RX ORDER — ACETAMINOPHEN 325 MG/1
650 TABLET ORAL EVERY 6 HOURS PRN
COMMUNITY

## 2023-06-08 RX ORDER — SODIUM CHLORIDE 0.9 % (FLUSH) 0.9 %
5-40 SYRINGE (ML) INJECTION EVERY 12 HOURS SCHEDULED
Status: DISCONTINUED | OUTPATIENT
Start: 2023-06-08 | End: 2023-06-08 | Stop reason: HOSPADM

## 2023-06-08 RX ORDER — CEPHALEXIN 250 MG/1
250 CAPSULE ORAL 4 TIMES DAILY
Qty: 4 CAPSULE | Refills: 0 | Status: SHIPPED | OUTPATIENT
Start: 2023-06-08 | End: 2023-06-09

## 2023-06-08 RX ORDER — OXYCODONE HYDROCHLORIDE 5 MG/1
5 TABLET ORAL PRN
Status: COMPLETED | OUTPATIENT
Start: 2023-06-08 | End: 2023-06-08

## 2023-06-08 RX ORDER — OXYCODONE HYDROCHLORIDE 5 MG/1
10 TABLET ORAL PRN
Status: COMPLETED | OUTPATIENT
Start: 2023-06-08 | End: 2023-06-08

## 2023-06-08 RX ORDER — SODIUM CHLORIDE 0.9 % (FLUSH) 0.9 %
5-40 SYRINGE (ML) INJECTION PRN
Status: CANCELLED | OUTPATIENT
Start: 2023-06-08

## 2023-06-08 RX ORDER — SODIUM CHLORIDE 0.9 % (FLUSH) 0.9 %
5-40 SYRINGE (ML) INJECTION PRN
Status: DISCONTINUED | OUTPATIENT
Start: 2023-06-08 | End: 2023-06-08 | Stop reason: HOSPADM

## 2023-06-08 RX ORDER — FENTANYL CITRATE 50 UG/ML
INJECTION, SOLUTION INTRAMUSCULAR; INTRAVENOUS PRN
Status: DISCONTINUED | OUTPATIENT
Start: 2023-06-08 | End: 2023-06-08 | Stop reason: SDUPTHER

## 2023-06-08 RX ORDER — SODIUM CHLORIDE, SODIUM LACTATE, POTASSIUM CHLORIDE, CALCIUM CHLORIDE 600; 310; 30; 20 MG/100ML; MG/100ML; MG/100ML; MG/100ML
INJECTION, SOLUTION INTRAVENOUS CONTINUOUS
Status: DISCONTINUED | OUTPATIENT
Start: 2023-06-08 | End: 2023-06-08 | Stop reason: HOSPADM

## 2023-06-08 RX ORDER — HYDROCODONE BITARTRATE AND ACETAMINOPHEN 5; 325 MG/1; MG/1
1 TABLET ORAL EVERY 4 HOURS PRN
Qty: 15 TABLET | Refills: 0 | Status: SHIPPED | OUTPATIENT
Start: 2023-06-08 | End: 2023-06-11

## 2023-06-08 RX ORDER — SODIUM CHLORIDE 9 MG/ML
INJECTION, SOLUTION INTRAVENOUS PRN
Status: CANCELLED | OUTPATIENT
Start: 2023-06-08

## 2023-06-08 RX ORDER — OXYCODONE HYDROCHLORIDE 5 MG/1
5 TABLET ORAL EVERY 4 HOURS PRN
Status: CANCELLED | OUTPATIENT
Start: 2023-06-08

## 2023-06-08 RX ORDER — FENTANYL CITRATE 50 UG/ML
25 INJECTION, SOLUTION INTRAMUSCULAR; INTRAVENOUS EVERY 5 MIN PRN
Status: DISCONTINUED | OUTPATIENT
Start: 2023-06-08 | End: 2023-06-08 | Stop reason: HOSPADM

## 2023-06-08 RX ORDER — FENTANYL CITRATE 50 UG/ML
50 INJECTION, SOLUTION INTRAMUSCULAR; INTRAVENOUS EVERY 5 MIN PRN
Status: DISCONTINUED | OUTPATIENT
Start: 2023-06-08 | End: 2023-06-08 | Stop reason: HOSPADM

## 2023-06-08 RX ORDER — KETOROLAC TROMETHAMINE 30 MG/ML
INJECTION, SOLUTION INTRAMUSCULAR; INTRAVENOUS PRN
Status: DISCONTINUED | OUTPATIENT
Start: 2023-06-08 | End: 2023-06-08 | Stop reason: SDUPTHER

## 2023-06-08 RX ORDER — SODIUM CHLORIDE 0.9 % (FLUSH) 0.9 %
5-40 SYRINGE (ML) INJECTION EVERY 12 HOURS SCHEDULED
Status: CANCELLED | OUTPATIENT
Start: 2023-06-08

## 2023-06-08 RX ORDER — LIDOCAINE HYDROCHLORIDE 20 MG/ML
INJECTION, SOLUTION INTRAVENOUS PRN
Status: DISCONTINUED | OUTPATIENT
Start: 2023-06-08 | End: 2023-06-08 | Stop reason: SDUPTHER

## 2023-06-08 RX ORDER — OXYCODONE HYDROCHLORIDE 5 MG/1
10 TABLET ORAL EVERY 4 HOURS PRN
Status: CANCELLED | OUTPATIENT
Start: 2023-06-08

## 2023-06-08 RX ORDER — KETOROLAC TROMETHAMINE 30 MG/ML
30 INJECTION, SOLUTION INTRAMUSCULAR; INTRAVENOUS EVERY 6 HOURS
Status: CANCELLED | OUTPATIENT
Start: 2023-06-08 | End: 2023-06-11

## 2023-06-08 RX ORDER — ACETAMINOPHEN 500 MG
1000 TABLET ORAL ONCE
Status: COMPLETED | OUTPATIENT
Start: 2023-06-08 | End: 2023-06-08

## 2023-06-08 RX ORDER — MIDAZOLAM HYDROCHLORIDE 1 MG/ML
INJECTION INTRAMUSCULAR; INTRAVENOUS PRN
Status: DISCONTINUED | OUTPATIENT
Start: 2023-06-08 | End: 2023-06-08 | Stop reason: SDUPTHER

## 2023-06-08 RX ORDER — DEXAMETHASONE SODIUM PHOSPHATE 4 MG/ML
INJECTION, SOLUTION INTRA-ARTICULAR; INTRALESIONAL; INTRAMUSCULAR; INTRAVENOUS; SOFT TISSUE PRN
Status: DISCONTINUED | OUTPATIENT
Start: 2023-06-08 | End: 2023-06-08 | Stop reason: SDUPTHER

## 2023-06-08 RX ADMIN — PROPOFOL 150 MG: 10 INJECTION, EMULSION INTRAVENOUS at 10:22

## 2023-06-08 RX ADMIN — SODIUM CHLORIDE, POTASSIUM CHLORIDE, SODIUM LACTATE AND CALCIUM CHLORIDE: 600; 310; 30; 20 INJECTION, SOLUTION INTRAVENOUS at 08:13

## 2023-06-08 RX ADMIN — CEFAZOLIN 2000 MG: 2 INJECTION, POWDER, FOR SOLUTION INTRAMUSCULAR; INTRAVENOUS at 08:13

## 2023-06-08 RX ADMIN — CEFAZOLIN 2000 MG: 2 INJECTION, POWDER, FOR SOLUTION INTRAMUSCULAR; INTRAVENOUS at 10:20

## 2023-06-08 RX ADMIN — ONDANSETRON 4 MG: 2 INJECTION INTRAMUSCULAR; INTRAVENOUS at 10:22

## 2023-06-08 RX ADMIN — KETOROLAC TROMETHAMINE 30 MG: 30 INJECTION, SOLUTION INTRAMUSCULAR; INTRAVENOUS at 10:49

## 2023-06-08 RX ADMIN — LIDOCAINE HYDROCHLORIDE 50 MG: 20 INJECTION, SOLUTION INTRAVENOUS at 10:22

## 2023-06-08 RX ADMIN — ACETAMINOPHEN 1000 MG: 500 TABLET ORAL at 08:04

## 2023-06-08 RX ADMIN — DEXAMETHASONE SODIUM PHOSPHATE 8 MG: 4 INJECTION, SOLUTION INTRAMUSCULAR; INTRAVENOUS at 10:22

## 2023-06-08 RX ADMIN — MIDAZOLAM 2 MG: 1 INJECTION INTRAMUSCULAR; INTRAVENOUS at 10:18

## 2023-06-08 RX ADMIN — OXYCODONE HYDROCHLORIDE 5 MG: 5 TABLET ORAL at 12:30

## 2023-06-08 RX ADMIN — FENTANYL CITRATE 100 MCG: 50 INJECTION, SOLUTION INTRAMUSCULAR; INTRAVENOUS at 10:22

## 2023-06-08 ASSESSMENT — PAIN DESCRIPTION - ONSET
ONSET: ON-GOING

## 2023-06-08 ASSESSMENT — PAIN DESCRIPTION - DESCRIPTORS
DESCRIPTORS: DISCOMFORT
DESCRIPTORS: ACHING;STABBING
DESCRIPTORS: ACHING;DISCOMFORT
DESCRIPTORS: DISCOMFORT
DESCRIPTORS: ACHING;STABBING

## 2023-06-08 ASSESSMENT — PAIN DESCRIPTION - PAIN TYPE
TYPE: SURGICAL PAIN
TYPE: SURGICAL PAIN
TYPE: SURGICAL PAIN;CHRONIC PAIN

## 2023-06-08 ASSESSMENT — PAIN DESCRIPTION - ORIENTATION
ORIENTATION: RIGHT

## 2023-06-08 ASSESSMENT — PAIN DESCRIPTION - LOCATION
LOCATION: KNEE

## 2023-06-08 ASSESSMENT — PAIN - FUNCTIONAL ASSESSMENT
PAIN_FUNCTIONAL_ASSESSMENT: PREVENTS OR INTERFERES SOME ACTIVE ACTIVITIES AND ADLS
PAIN_FUNCTIONAL_ASSESSMENT: 0-10
PAIN_FUNCTIONAL_ASSESSMENT: PREVENTS OR INTERFERES SOME ACTIVE ACTIVITIES AND ADLS

## 2023-06-08 ASSESSMENT — PAIN DESCRIPTION - FREQUENCY
FREQUENCY: CONTINUOUS
FREQUENCY: CONTINUOUS

## 2023-06-08 ASSESSMENT — PAIN SCALES - GENERAL
PAINLEVEL_OUTOF10: 7
PAINLEVEL_OUTOF10: 8
PAINLEVEL_OUTOF10: 7
PAINLEVEL_OUTOF10: 5

## 2023-06-08 NOTE — H&P
her left knee I recommend surgical treatment for her right knee. I discussed with her today performing right knee arthroscopy with chondroplasty of the patella. I explained risks, benefits, possible complications of the procedure and answered all questions for the patient. I explained postoperative rehabilitation protocol and expectations with the patient today. The patient understands and consents to the procedure. Patient will follow up with their primary care physician prior to surgical treatment for preoperative clearance. We will schedule surgery at soonest convenience. Continue weight-bearing as tolerated. Continue range of motion exercises as instructed. Ice and elevate as needed. Tylenol or Motrin for pain. Follow up in 2 weeks postop. Pt seen and examined, No change in H+P.                      Dennis 97, DO

## 2023-06-08 NOTE — PROGRESS NOTES
1100 patient arrived to pacu, monitors placed and alarms on. Received report/handoff from Jacqueline Alvarez. Patient awake and alert, denies any complaints. Ice packs applied to right knee. 1120 Continues to rest without complaints. [address] [facility] Dr. Jalyn Mathews at bedside. Taking ice chips, denies any nausea. 1155 Transported back to \[facility]\"" room 10, report given to 102 [facility] at bedside.

## 2023-06-08 NOTE — PROGRESS NOTES
211 [facility] instructions reviewed with pt and partner, all parties express understanding. 1325 Pt. To DC home with partner. Pt. Taken down to main entrance by [facility].

## 2023-06-08 NOTE — DISCHARGE INSTRUCTIONS
[facility]  [phone]    Do not drive, work around [address] [place], [place] [address]th  or use equipment. Do not drink any alcoholic beverages. Do not smoke while alone. Avoid making important decisions. Plan to spend a quiet, relaxed evening @ home. Resume normal activities as you begin to feel better. Eat lightly for your first meal, then gradually increase your diet to what is normal for you. In case of nausea, avoid food and drink only clear liquids. Resume food as nausea ceases. Notify your surgeon if you experience fever, chills, large amount of bleeding, difficulty breathing, persistent nausea and vomiting or any other disturbing problem. Call for a follow-up appointment with your surgeon. FOLLOW ALL INSTRUCTIONS IN THE FOLDER FROM DR Ana Moreau.

## 2023-06-08 NOTE — OP NOTE
anterolateral portal.   At this point, I performed a standard diagnostic arthroscopy evaluating  the patellofemoral compartment, medial and lateral compartments of the  knee, as well as the medial and lateral gutters of the knee. Within the patellofemoral compartment and found there to be moderate sized lesion of grade 2 to grade 3 arthritic changes on the patella. I then used the Arthrocare  wand to perform a chondroplasty of the patella debriding loose articular cartilage   back to solid stable border circumferentially. I then evaluated the femoral notch and found ACL and  PCL to be intact. I then turned my attention to the lateral compartment. Within the  lateral compartment, I found  the cartilage on the femoral condyle and tibial plateau to be virtually intact. I then evaluated the lateral meniscus which was probed and found to be intact. I then turned my attention to the medial compartment of the knee. Within the medial compartment I found the cartilage on the femoral condyle and tibial plateau to be complete intact. The medial meniscus was probed and found to be intact. I then evaluated the medial and lateral gutters of the knee and found there to be no additional pathology present. Arthroscopic instrumentation was then removed of the knee. Excess fluid was then drained from the knee. Skin incisions were then closed using 3-0 nylon suture. Tourniquet was then deflated for total of 13 minutes and adequate hemostasis was maintained. I then injected 20 mL of 0.5% plain ropivacaine into the knee joint. I then applied a sterile soft dressing to the right lower extremity. The patient was then awakened from anesthesia and transported to PACU in stable condition. She appeared to have tolerated the procedure well. PROGNOSIS:  At this point, she will be discharged to home with a short  course of oral antibiotics as well as pain medication.   She can have  immediate weightbearing as

## 2023-06-08 NOTE — ANESTHESIA POSTPROCEDURE EVALUATION
Department of Anesthesiology  Postprocedure Note    Patient: Jose Eduardo Patel  MRN: [id number]  YOB: 1995  Date of evaluation: 6/8/2023      Procedure Summary     Date: 06/08/23 Room / Location: [address]    Anesthesia Start: 1020 Anesthesia Stop: 1104    Procedure: RIGHT KNEE ARTHROSCOPY WITH CHRONDROPLASTY OF THE PATELLA (Right: Knee) Diagnosis:       Chondromalacia of patellofemoral joint, right      (Chondromalacia of patellofemoral joint, right [M22.41])    Surgeons: Orlin Austin DO Responsible Provider: Sandhya Wilks MD    Anesthesia Type: general ASA Status: 2          Anesthesia Type: No value filed.     Shawn Phase I: Shawn Score: 10    Shawn Phase II: Shawn Score: 10      Anesthesia Post Evaluation    Patient location during evaluation: bedside  Patient participation: complete - patient participated  Level of consciousness: awake  Pain score: 1  Airway patency: patent  Nausea & Vomiting: no nausea and no vomiting  Complications: no  Cardiovascular status: hemodynamically stable  Respiratory status: acceptable  Hydration status: euvolemic

## 2023-06-08 NOTE — BRIEF OP NOTE
Brief Postoperative Note      Patient: Anuja Crawford  YOB: 1995  MRN: [id number]    Date of Procedure: 6/8/2023    Pre-Op Diagnosis Codes:     * Chondromalacia of patellofemoral joint, right [M22.41]    Post-Op Diagnosis: Same       Procedure(s):  RIGHT KNEE ARTHROSCOPY WITH CHRONDROPLASTY OF THE PATELLA    Surgeon(s):  Ana Duran DO    Assistant:  * No surgical staff found *    Anesthesia: General    Estimated Blood Loss (mL): Minimal    Complications: None    Specimens:   * No specimens in log *    Implants:  * No implants in log *      Drains: * No LDAs found *    Findings: R knee chondromalacia patella        Electronically signed by Aleisha Mathews DO on 6/8/2023 at 11:15 AM

## 2023-06-08 NOTE — ANESTHESIA PRE PROCEDURE
Department of Anesthesiology  Preprocedure Note       Name:  Jada Weathers   Age:  24 y.o.  :  1995                                          MRN:  [id number]         Date:  2023      Surgeon: Yolis Obrien):  Shayy Ngo DO    Procedure: Procedure(s):  RIGHT KNEE ARTHROSCOPY WITH CHRONDROPLASTY OF THE PATELLA    Medications prior to admission:   Prior to Admission medications    Medication Sig Start Date End Date Taking? Authorizing Provider   acetaminophen (TYLENOL) 325 MG tablet Take 2 tablets by mouth every 6 hours as needed for Pain   Yes Historical Provider, MD   IUD'S IU by IntraUTERine route   Yes Historical Provider, MD   midodrine (PROAMATINE) 5 MG tablet TAKE 1 TABLET BY MOUTH THREE TIMES DAILY  Patient not taking: Reported on 22   Historical Provider, MD       Current medications:    Current Facility-Administered Medications   Medication Dose Route Frequency Provider Last Rate Last Admin    lactated ringers IV soln infusion   IntraVENous Continuous Shayy Ngo  mL/hr at 2313 New Bag at 23    sodium chloride flush 0.9 % injection 5-40 mL  5-40 mL IntraVENous 2 times per day Shayy Ngo DO        sodium chloride flush 0.9 % injection 5-40 mL  5-40 mL IntraVENous PRN Shayy Ngo,         0.9 % sodium chloride infusion   IntraVENous PRN Shayy Ngo DO        ceFAZolin (ANCEF) 2,000 mg in sodium chloride 0.9 % 50 mL IVPB (mini-bag)  2,000 mg IntraVENous On Call to ige Anson 10,  mL/hr at 23 2,000 mg at 23       Allergies: Allergies   Allergen Reactions    Adhesive Tape Itching and Rash       Problem List:    Patient Active Problem List   Diagnosis Code    Migraine aura without headache G43. 109    Syncope and collapse R55    Orthostatic hypotension I95.1       Past Medical History:        Diagnosis Date    H/O cardiovascular stress test 2021    normal stress    H/O echocardiogram

## 2023-06-08 NOTE — PROGRESS NOTES
1155  Pt back to Same Day from PACU per cart. Pt is awake and alert--skin W&D. Has drsg with ace wrap dry and intact to Rt knee--ice pack x 2 in place around knee. Report received from [facility]. VS are stable. Pt given soda and crackers   1230 Pt medicated for pain as ordered. Appears comfortable on cart, using phone. Tolerating po intake w/o nausea.

## 2023-06-26 ENCOUNTER — OFFICE VISIT (OUTPATIENT)
Dept: ORTHOPEDIC SURGERY | Age: 28
End: 2023-06-26

## 2023-06-26 VITALS — BODY MASS INDEX: 22.67 KG/M2 | OXYGEN SATURATION: 96 % | RESPIRATION RATE: 16 BRPM | HEART RATE: 66 BPM | HEIGHT: 63 IN

## 2023-06-26 DIAGNOSIS — Z98.890 S/P RIGHT KNEE ARTHROSCOPY: Primary | ICD-10-CM

## 2023-06-26 PROCEDURE — 99024 POSTOP FOLLOW-UP VISIT: CPT | Performed by: ORTHOPAEDIC SURGERY

## 2023-06-27 ASSESSMENT — ENCOUNTER SYMPTOMS
ABDOMINAL PAIN: 0
EYE REDNESS: 0
BACK PAIN: 0
CHEST TIGHTNESS: 0
COLOR CHANGE: 0

## 2023-07-24 ENCOUNTER — OFFICE VISIT (OUTPATIENT)
Dept: ORTHOPEDIC SURGERY | Age: 28
End: 2023-07-24

## 2023-07-24 VITALS
HEART RATE: 83 BPM | BODY MASS INDEX: 22.68 KG/M2 | RESPIRATION RATE: 16 BRPM | OXYGEN SATURATION: 97 % | HEIGHT: 63 IN | WEIGHT: 128 LBS

## 2023-07-24 DIAGNOSIS — Z98.890 S/P RIGHT KNEE ARTHROSCOPY: Primary | ICD-10-CM

## 2023-07-24 PROCEDURE — 99024 POSTOP FOLLOW-UP VISIT: CPT | Performed by: PHYSICIAN ASSISTANT

## 2023-07-24 RX ORDER — DROSPIRENONE 4 MG/1
TABLET, FILM COATED ORAL
COMMUNITY
Start: 2023-05-25

## 2023-07-26 NOTE — PROGRESS NOTES
Date of surgery:   6/8/2023  Surgeon: Preston Almendarez    History:  Ms. Woody Pinto is here in follow up regarding her right knee arthroscopy with chondroplasty. She states that she is doing better at this visit than she was at the last visit with Dr. Preston Almendarez. She does still have some pain in the kneecap region but it is much better than what it was. Physical:  Vitals:    07/24/23 1521   Pulse: 83   Resp: 16   SpO2: 97%     Right knee:  Arthroscopic portal sites are well-healed with no erythema, no joint effusion. Range of motion of the right knee shows 0 degrees extension to 125 degrees flexion. No significant knee joint effusion. Impression: Status post above, doing well       Plan:   Continue range of motion and activity as tolerated. Work on strengthening the quad, hamstrings, calf.   Follow-up as needed

## 2023-08-01 NOTE — PATIENT INSTRUCTIONS
Continue range of motion and activity as tolerated. Work on strengthening the quad, hamstrings, calf.   Follow-up as needed

## 2023-08-22 ENCOUNTER — TELEPHONE (OUTPATIENT)
Dept: FAMILY MEDICINE CLINIC | Age: 28
End: 2023-08-22

## 2023-08-22 NOTE — TELEPHONE ENCOUNTER
----- Message from Candance Jersey sent at 8/22/2023  1:58 PM EDT -----  Subject: Appointment Request    Reason for Call: Established Patient Appointment needed: Flu Shot    QUESTIONS    Reason for appointment request? Other - not scheduled thru ecc     Additional Information for Provider? patient would like to schedule a flu   shot for school, patient is aware supply is available around Oct  ---------------------------------------------------------------------------  --------------  [facility]  [phone]; OK to leave message on voicemail  ---------------------------------------------------------------------------  --------------  SCRIPT ANSWERS

## 2023-09-14 NOTE — PROGRESS NOTES
Consult Note  [facility]  Patient Name: Tan Mondragon  : 1995        Subjective:   Reason for consult:   Patient seen and examined. Chart reviewed in detail. 34 y.o. -female with PMH anxiety and related chest pains (extensive negative cardiac workup completed ) presenting to [facility] for headaches. These have been ongoing for the past 15-20 years. The headache is bilateral periorbital.  She describes it as throbbing in nature. There is associated photo and phonophobia. She admits to nausea and emesis. These always last >4 hours and typically last 24 hours. She does endorse aura with their migraine, described as a line in her vision. Discussed increased risk of stroke with migraine aura and decrease subsequently when on appropriate preventative migraine regimen. Red flag features: Her headaches have started waking her from sleep    Migraine History:  Triggers:    Baseline headache frequency: 30/30 days per month  Baseline migraine frequency: 4/30 days per month    Prior Preventative medications tried: topamax, buspar  Prior abortive medications tried: tylenol, ibuprofen    Current preventative: None  Current abortive: Tylenol, ibuprofen    No plans for more kids. Has IUD. She drinks only 1-2 bottles of water daily. We discussed increasing intake. 7 hours sleep, 3 meals per day. Takes daily Tylenol and/or ibuprofen for these. We discussed medication overuse headaches. Does have anxiety. She does have episodic chest pain but has undergone extensive cardiac testing for this which demonstrated no evidence of coronary artery disease.     Past Medical History:   Diagnosis Date    H/O cardiovascular stress test 2021    normal stress    H/O echocardiogram 2021    EF 55-60% no evidence of pericardial effusion no significant valvular abnormalities    Hypotension     :   Past Surgical History:   Procedure Laterality Date    APPENDECTOMY

## 2023-09-15 ENCOUNTER — TELEPHONE (OUTPATIENT)
Dept: FAMILY MEDICINE CLINIC | Age: 28
End: 2023-09-15

## 2023-09-15 ENCOUNTER — OFFICE VISIT (OUTPATIENT)
Dept: FAMILY MEDICINE CLINIC | Age: 28
End: 2023-09-15
Payer: COMMERCIAL

## 2023-09-15 ENCOUNTER — OFFICE VISIT (OUTPATIENT)
Dept: NEUROLOGY | Age: 28
End: 2023-09-15
Payer: COMMERCIAL

## 2023-09-15 VITALS
OXYGEN SATURATION: 100 % | HEIGHT: 63 IN | DIASTOLIC BLOOD PRESSURE: 60 MMHG | HEART RATE: 70 BPM | SYSTOLIC BLOOD PRESSURE: 108 MMHG | WEIGHT: 135.2 LBS | BODY MASS INDEX: 23.96 KG/M2

## 2023-09-15 VITALS
WEIGHT: 134 LBS | SYSTOLIC BLOOD PRESSURE: 110 MMHG | OXYGEN SATURATION: 99 % | DIASTOLIC BLOOD PRESSURE: 64 MMHG | HEART RATE: 67 BPM | HEIGHT: 63 IN | BODY MASS INDEX: 23.74 KG/M2 | TEMPERATURE: 98.3 F

## 2023-09-15 DIAGNOSIS — G43.109 MIGRAINE WITH AURA AND WITHOUT STATUS MIGRAINOSUS, NOT INTRACTABLE: Primary | ICD-10-CM

## 2023-09-15 DIAGNOSIS — G44.40 MEDICATION OVERUSE HEADACHE: ICD-10-CM

## 2023-09-15 DIAGNOSIS — M94.0 COSTOCHONDRITIS: Primary | ICD-10-CM

## 2023-09-15 PROCEDURE — 99213 OFFICE O/P EST LOW 20 MIN: CPT | Performed by: FAMILY MEDICINE

## 2023-09-15 PROCEDURE — G8420 CALC BMI NORM PARAMETERS: HCPCS | Performed by: FAMILY MEDICINE

## 2023-09-15 PROCEDURE — G8427 DOCREV CUR MEDS BY ELIG CLIN: HCPCS | Performed by: FAMILY MEDICINE

## 2023-09-15 PROCEDURE — 99205 OFFICE O/P NEW HI 60 MIN: CPT | Performed by: STUDENT IN AN ORGANIZED HEALTH CARE EDUCATION/TRAINING PROGRAM

## 2023-09-15 PROCEDURE — 1036F TOBACCO NON-USER: CPT | Performed by: FAMILY MEDICINE

## 2023-09-15 RX ORDER — RIZATRIPTAN BENZOATE 10 MG/1
10 TABLET ORAL DAILY PRN
Qty: 9 TABLET | Refills: 2 | Status: SHIPPED | OUTPATIENT
Start: 2023-09-15

## 2023-09-15 RX ORDER — PREDNISONE 10 MG/1
10 TABLET ORAL
Qty: 15 TABLET | Refills: 0 | Status: SHIPPED | OUTPATIENT
Start: 2023-09-15 | End: 2023-09-20

## 2023-09-15 RX ORDER — DULOXETIN HYDROCHLORIDE 30 MG/1
30 CAPSULE, DELAYED RELEASE ORAL DAILY
Qty: 30 CAPSULE | Refills: 5 | Status: SHIPPED | OUTPATIENT
Start: 2023-09-15

## 2023-09-15 ASSESSMENT — ENCOUNTER SYMPTOMS
DIARRHEA: 0
EYE PAIN: 0
CHEST TIGHTNESS: 0
VOMITING: 0
TROUBLE SWALLOWING: 0
WHEEZING: 0
NAUSEA: 0
ABDOMINAL PAIN: 0
SHORTNESS OF BREATH: 0
BLOOD IN STOOL: 0

## 2023-09-15 NOTE — PROGRESS NOTES
bruise/bleed easily. Psychiatric/Behavioral:  Negative for agitation, confusion and hallucinations.         PAST MEDICAL HISTORY  Past Medical History:   Diagnosis Date    H/O cardiovascular stress test 02/26/2021    normal stress    H/O echocardiogram 02/23/2021    EF 55-60% no evidence of pericardial effusion no significant valvular abnormalities    Hypotension        FAMILY HISTORY  Family History   Problem Relation Age of Onset    Heart Disease Mother         pacemaker    Migraines Mother     Coronary Art Dis Other         GRANDFATHER AND GREAT GRANDFATHER (NOT SPECIFIED)    Diabetes Other         GRANDMOTHER - NON INSULIN DEPENDNET       SOCIAL HISTORY  Social History     Socioeconomic History    Marital status: Single   Tobacco Use    Smoking status: Never    Smokeless tobacco: Never   Vaping Use    Vaping Use: Never used   Substance and Sexual Activity    Alcohol use: No    Drug use: No    Sexual activity: Yes     Partners: Male     Social Determinants of Health     Financial Resource Strain: Low Risk  (4/2/2021)    Overall Financial Resource Strain (CARDIA)     Difficulty of Paying Living Expenses: Not hard at all   Food Insecurity: No Food Insecurity (4/2/2021)    Hunger Vital Sign     Worried About Running Out of Food in the Last Year: Never true     Ran Out of Food in the Last Year: Never true   Transportation Needs: No Transportation Needs (4/2/2021)    PRAPARE - Transportation     Lack of Transportation (Medical): No     Lack of Transportation (Non-Medical): No        SURGICAL HISTORY  Past Surgical History:   Procedure Laterality Date    APPENDECTOMY  08/2017    COLONOSCOPY  2019    KNEE ARTHROSCOPY Left 10/20/2022    LEFT KNEE ARTHROSCOPY WITH CHONDROPLASTY performed by Tala Gracia DO at [address] [place] [address] ARTHROSCOPY Right 6/8/2023    RIGHT KNEE ARTHROSCOPY WITH CHRONDROPLASTY OF THE PATELLA performed by Tala Gracia DO at [address] [facility]  2014    Left groin    TONSILLECTOMY

## 2023-09-15 NOTE — TELEPHONE ENCOUNTER
Er f/u chest pain. Went to er 9/15/23 ruled out cardiac. Cont. Rt chest pain.  Sched appt 9/15/23 3:15 pm Dr Radha Redman

## 2023-09-26 ENCOUNTER — HOSPITAL ENCOUNTER (OUTPATIENT)
Dept: MRI IMAGING | Age: 28
Discharge: HOME | End: 2023-09-26
Attending: STUDENT IN AN ORGANIZED HEALTH CARE EDUCATION/TRAINING PROGRAM
Payer: COMMERCIAL

## 2023-09-26 DIAGNOSIS — G43.109 MIGRAINE WITH AURA AND WITHOUT STATUS MIGRAINOSUS, NOT INTRACTABLE: ICD-10-CM

## 2023-09-26 PROCEDURE — 70551 MRI BRAIN STEM W/O DYE: CPT

## 2023-09-28 ENCOUNTER — NURSE ONLY (OUTPATIENT)
Dept: FAMILY MEDICINE CLINIC | Age: 28
End: 2023-09-28
Payer: COMMERCIAL

## 2023-09-28 DIAGNOSIS — Z23 NEED FOR VACCINATION: Primary | ICD-10-CM

## 2023-09-28 PROCEDURE — 99999 PR OFFICE/OUTPT VISIT,PROCEDURE ONLY: CPT | Performed by: PHYSICIAN ASSISTANT

## 2023-09-28 PROCEDURE — 90471 IMMUNIZATION ADMIN: CPT | Performed by: PHYSICIAN ASSISTANT

## 2023-09-28 PROCEDURE — 90674 CCIIV4 VAC NO PRSV 0.5 ML IM: CPT | Performed by: PHYSICIAN ASSISTANT

## 2023-10-03 ENCOUNTER — OFFICE VISIT (OUTPATIENT)
Dept: NEUROLOGY | Age: 28
End: 2023-10-03
Payer: COMMERCIAL

## 2023-10-03 VITALS
WEIGHT: 134 LBS | HEIGHT: 63 IN | SYSTOLIC BLOOD PRESSURE: 100 MMHG | HEART RATE: 73 BPM | DIASTOLIC BLOOD PRESSURE: 60 MMHG | BODY MASS INDEX: 23.74 KG/M2 | OXYGEN SATURATION: 96 %

## 2023-10-03 DIAGNOSIS — G43.109 MIGRAINE WITH AURA AND WITHOUT STATUS MIGRAINOSUS, NOT INTRACTABLE: Primary | ICD-10-CM

## 2023-10-03 PROCEDURE — 1036F TOBACCO NON-USER: CPT | Performed by: NURSE PRACTITIONER

## 2023-10-03 PROCEDURE — G8427 DOCREV CUR MEDS BY ELIG CLIN: HCPCS | Performed by: NURSE PRACTITIONER

## 2023-10-03 PROCEDURE — G8482 FLU IMMUNIZE ORDER/ADMIN: HCPCS | Performed by: NURSE PRACTITIONER

## 2023-10-03 PROCEDURE — 99214 OFFICE O/P EST MOD 30 MIN: CPT | Performed by: NURSE PRACTITIONER

## 2023-10-03 PROCEDURE — G8420 CALC BMI NORM PARAMETERS: HCPCS | Performed by: NURSE PRACTITIONER

## 2023-10-03 RX ORDER — TIZANIDINE 4 MG/1
4 TABLET ORAL NIGHTLY
Qty: 30 TABLET | Refills: 3 | Status: SHIPPED | OUTPATIENT
Start: 2023-10-03

## 2023-10-03 NOTE — PROGRESS NOTES
10/3/23    Shira Narvaez  1995    Chief Complaint   Patient presents with    Follow-up     Migraines       History of Present Illness  Madeleine Closs is a 34 y.o. female presenting today for follow-up of: Migraines. Migraine with aura, medication overuse headache. MRI brain was nonacute, did show maxillary sinus disease, right greater than left. She was started on Cymbalta 30 mg daily for migraine prevention. She was also started on Maxalt for abortive therapy. At her initial visit with Dr. Christina Hermosillo, she was having 30/30 headache days, 4/30 were migrainous in nature. Today, she tells me that she is taking the Cymbalta but it makes her feel \"down\" and she prefers to try something different. It is not at all that helpful at decreasing her headaches and migraines either. She has about 5 headaches per week and 1 migraine per week. Unfortunately, she has not tried her Maxalt yet to see if it is helpful at decreasing the intensity of her migraine. Baseline headache frequency: 30/30 days per month  Baseline migraine frequency: 4/30 days per month     Prior Preventative medications tried: topamax, buspar, Cymbalta  Prior abortive medications tried: tylenol, ibuprofen     Current preventative: Tizanidine  Current abortive: Maxalt     Current Outpatient Medications   Medication Sig Dispense Refill    tiZANidine (ZANAFLEX) 4 MG tablet Take 1 tablet by mouth nightly Take 1/2 tab nightly for 8 nights then increase to full tab. 30 tablet 3    rizatriptan (MAXALT) 10 MG tablet Take 1 tablet by mouth daily as needed for Migraine (no more than 3 days per week) May repeat in 2 hours if needed 9 tablet 2    acetaminophen (TYLENOL) 325 MG tablet Take 2 tablets by mouth every 6 hours as needed for Pain      IUD'S IU by IntraUTERine route       No current facility-administered medications for this visit.        Physical Exam:  Mental Status: A&O to self, location, month and year, NAD, speech clear, language fluent,

## 2023-10-26 ENCOUNTER — OFFICE VISIT (OUTPATIENT)
Dept: ORTHOPEDIC SURGERY | Age: 28
End: 2023-10-26

## 2023-10-26 VITALS
OXYGEN SATURATION: 99 % | HEART RATE: 93 BPM | RESPIRATION RATE: 16 BRPM | HEIGHT: 63 IN | BODY MASS INDEX: 23.6 KG/M2 | WEIGHT: 133.2 LBS

## 2023-10-26 DIAGNOSIS — M22.41 CHONDROMALACIA PATELLAE, RIGHT KNEE: Primary | ICD-10-CM

## 2023-10-26 RX ORDER — TRIAMCINOLONE ACETONIDE 40 MG/ML
40 INJECTION, SUSPENSION INTRA-ARTICULAR; INTRAMUSCULAR ONCE
Status: COMPLETED | OUTPATIENT
Start: 2023-10-26 | End: 2023-10-26

## 2023-10-26 RX ADMIN — TRIAMCINOLONE ACETONIDE 40 MG: 40 INJECTION, SUSPENSION INTRA-ARTICULAR; INTRAMUSCULAR at 14:37

## 2023-10-26 ASSESSMENT — ENCOUNTER SYMPTOMS
GASTROINTESTINAL NEGATIVE: 1
RESPIRATORY NEGATIVE: 1
EYES NEGATIVE: 1

## 2023-10-26 NOTE — PROGRESS NOTES
Right knee scope in 06/08/2023. Patient states that she has surgery and she believes she didn't heal correctly.

## 2023-10-26 NOTE — PATIENT INSTRUCTIONS
Continue weight-bearing as tolerated. Continue range of motion exercises as instructed. Ice and elevate as needed. Tylenol or Motrin for pain. Injection given into the right knee. Call office in 4 weeks if not better    We are committed to providing you the best care possible. If you receive a survey after visiting one of our offices, please take time to share your experience concerning your physician office visit. These surveys are confidential and no health information about you is shared. We are eager to improve for you and we are counting on your feedback to help make that happen.

## 2023-10-26 NOTE — PROGRESS NOTES
Review of Systems   Constitutional: Negative. HENT: Negative. Eyes: Negative. Respiratory: Negative. Cardiovascular: Negative. Gastrointestinal: Negative. Genitourinary: Negative. Musculoskeletal:  Positive for arthralgias. Skin: Negative. Negative for rash and wound. Neurological: Negative. Psychiatric/Behavioral: Negative. HPI:  Zane Quintero is a 34 y.o. female who presents to the office today complaining of right knee pain. 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 She did have chondroplasty of the patella given the fact that she had a central defect within the patella at that time. She states that the knee has been bothering her more recently. She states that about a month ago she accidentally shut her knee in her car door. It has not gotten significantly better. She states that with stairs her knee pain is a 7 or an 8/10. At rest her knee pain is a 2/10.     Past Medical History:   Diagnosis Date    H/O cardiovascular stress test 02/26/2021    normal stress    H/O echocardiogram 02/23/2021    EF 55-60% no evidence of pericardial effusion no significant valvular abnormalities    Hypotension        Past Surgical History:   Procedure Laterality Date    APPENDECTOMY  08/2017    COLONOSCOPY  2019    KNEE ARTHROSCOPY Left 10/20/2022    LEFT KNEE ARTHROSCOPY WITH CHONDROPLASTY performed by Flaquito Trejo DO at [address] [place] [address] ARTHROSCOPY Right 6/8/2023    RIGHT KNEE ARTHROSCOPY WITH CHRONDROPLASTY OF THE PATELLA performed by Flaquito Trejo DO at [address] [facility]  2014    Left groin    TONSILLECTOMY         Family History   Problem Relation Age of Onset    Heart Disease Mother         pacemaker    Migraines Mother     Coronary Art Dis Other         GRANDFATHER AND GREAT GRANDFATHER (NOT SPECIFIED)    Diabetes Other         GRANDMOTHER - NON INSULIN DEPENDNET       Social History     Socioeconomic History    Marital

## 2023-11-03 ENCOUNTER — OFFICE VISIT (OUTPATIENT)
Dept: FAMILY MEDICINE CLINIC | Age: 28
End: 2023-11-03
Payer: COMMERCIAL

## 2023-11-03 VITALS
WEIGHT: 131 LBS | HEIGHT: 63 IN | BODY MASS INDEX: 23.21 KG/M2 | DIASTOLIC BLOOD PRESSURE: 60 MMHG | OXYGEN SATURATION: 96 % | HEART RATE: 70 BPM | SYSTOLIC BLOOD PRESSURE: 92 MMHG

## 2023-11-03 DIAGNOSIS — L70.0 PUSTULAR ACNE: ICD-10-CM

## 2023-11-03 DIAGNOSIS — R59.0 LYMPHADENOPATHY OF RIGHT CERVICAL REGION: ICD-10-CM

## 2023-11-03 DIAGNOSIS — F41.8 SITUATIONAL ANXIETY: Primary | ICD-10-CM

## 2023-11-03 PROCEDURE — G8427 DOCREV CUR MEDS BY ELIG CLIN: HCPCS | Performed by: PHYSICIAN ASSISTANT

## 2023-11-03 PROCEDURE — G8482 FLU IMMUNIZE ORDER/ADMIN: HCPCS | Performed by: PHYSICIAN ASSISTANT

## 2023-11-03 PROCEDURE — 1036F TOBACCO NON-USER: CPT | Performed by: PHYSICIAN ASSISTANT

## 2023-11-03 PROCEDURE — 99214 OFFICE O/P EST MOD 30 MIN: CPT | Performed by: PHYSICIAN ASSISTANT

## 2023-11-03 PROCEDURE — G8420 CALC BMI NORM PARAMETERS: HCPCS | Performed by: PHYSICIAN ASSISTANT

## 2023-11-03 RX ORDER — TRETINOIN 0.05 G/100G
GEL TOPICAL
Qty: 45 G | Refills: 2 | Status: SHIPPED | OUTPATIENT
Start: 2023-11-03

## 2023-11-03 RX ORDER — BENZOYL PEROXIDE 2.5 G/100G
GEL TOPICAL
Qty: 60 G | Refills: 2 | Status: SHIPPED | OUTPATIENT
Start: 2023-11-03

## 2023-11-03 RX ORDER — HYDROXYZINE HYDROCHLORIDE 25 MG/1
TABLET, FILM COATED ORAL
Qty: 10 TABLET | Refills: 0 | Status: SHIPPED | OUTPATIENT
Start: 2023-11-03

## 2023-11-03 NOTE — PROGRESS NOTES
11/3/2023    [id number] [facility]    Chief Complaint   Patient presents with    Anxiety     - pt states will going on her first airplane flight. Would like med for anxiety       HPI  History was obtained from pt. Lico Flores is a 34 y.o. female with a PMHx as listed below who presents today for anxiety    She will be flying to [place] and a little freaked out. Wants something for the flight. Painful lump right posterior neck this past week, tender and hurts to move her neck. Mon/tuesday had fever 101.7 out of nowhere then it resolved by the next day. Denies any other symptoms of URI or skin issues of the head. History of groin lymph node excision, thought to be from cat scratch, in 2014    Acne - has had it a long time, wants to treat it. Only uses soap right now. 1. Situational anxiety    2. Lymphadenopathy of right cervical region    3.  Pustular acne             REVIEW OF SYMPTOMS    Review of Systems    PAST MEDICAL HISTORY  Past Medical History:   Diagnosis Date    H/O cardiovascular stress test 02/26/2021    normal stress    H/O echocardiogram 02/23/2021    EF 55-60% no evidence of pericardial effusion no significant valvular abnormalities    Hypotension        FAMILY HISTORY  Family History   Problem Relation Age of Onset    Heart Disease Mother         pacemaker    Migraines Mother     Coronary Art Dis Other         GRANDFATHER AND GREAT GRANDFATHER (NOT SPECIFIED)    Diabetes Other         GRANDMOTHER - NON INSULIN DEPENDNET       SOCIAL HISTORY  Social History     Socioeconomic History    Marital status: Single   Tobacco Use    Smoking status: Never    Smokeless tobacco: Never   Vaping Use    Vaping Use: Never used   Substance and Sexual Activity    Alcohol use: No    Drug use: No    Sexual activity: Yes     Partners: Male     Social Determinants of Health     Financial Resource Strain: Low Risk  (4/2/2021)    Overall Financial Resource Strain (CARDIA)     Difficulty of Paying Living Expenses: Not

## 2023-11-04 LAB
BASOPHILS # BLD: 0.1 K/UL (ref 0–0.2)
BASOPHILS NFR BLD: 0.6 %
DEPRECATED RDW RBC AUTO: 13.1 % (ref 12.4–15.4)
EOSINOPHIL # BLD: 0.1 K/UL (ref 0–0.6)
EOSINOPHIL NFR BLD: 1 %
ERYTHROCYTE [SEDIMENTATION RATE] IN BLOOD BY WESTERGREN METHOD: 14 MM/HR (ref 0–20)
HCT VFR BLD AUTO: 39.5 % (ref 36–48)
HGB BLD-MCNC: 13.7 G/DL (ref 12–16)
LDH SERPL L TO P-CCNC: 167 U/L (ref 100–190)
LYMPHOCYTES # BLD: 2 K/UL (ref 1–5.1)
LYMPHOCYTES NFR BLD: 23.4 %
MCH RBC QN AUTO: 31 PG (ref 26–34)
MCHC RBC AUTO-ENTMCNC: 34.6 G/DL (ref 31–36)
MCV RBC AUTO: 89.4 FL (ref 80–100)
MONOCYTES # BLD: 0.7 K/UL (ref 0–1.3)
MONOCYTES NFR BLD: 8.7 %
NEUTROPHILS # BLD: 5.6 K/UL (ref 1.7–7.7)
NEUTROPHILS NFR BLD: 66.3 %
PLATELET # BLD AUTO: 218 K/UL (ref 135–450)
PMV BLD AUTO: 10.4 FL (ref 5–10.5)
RBC # BLD AUTO: 4.42 M/UL (ref 4–5.2)
WBC # BLD AUTO: 8.5 K/UL (ref 4–11)

## 2023-11-28 ENCOUNTER — OFFICE VISIT (OUTPATIENT)
Dept: FAMILY MEDICINE CLINIC | Age: 28
End: 2023-11-28
Payer: COMMERCIAL

## 2023-11-28 VITALS
BODY MASS INDEX: 23.74 KG/M2 | DIASTOLIC BLOOD PRESSURE: 70 MMHG | HEIGHT: 63 IN | WEIGHT: 134 LBS | SYSTOLIC BLOOD PRESSURE: 110 MMHG | HEART RATE: 77 BPM | OXYGEN SATURATION: 97 %

## 2023-11-28 DIAGNOSIS — R59.0 LYMPHADENOPATHY OF RIGHT CERVICAL REGION: Primary | ICD-10-CM

## 2023-11-28 DIAGNOSIS — L70.0 PUSTULAR ACNE: ICD-10-CM

## 2023-11-28 PROCEDURE — G8420 CALC BMI NORM PARAMETERS: HCPCS | Performed by: PHYSICIAN ASSISTANT

## 2023-11-28 PROCEDURE — G8482 FLU IMMUNIZE ORDER/ADMIN: HCPCS | Performed by: PHYSICIAN ASSISTANT

## 2023-11-28 PROCEDURE — 99214 OFFICE O/P EST MOD 30 MIN: CPT | Performed by: PHYSICIAN ASSISTANT

## 2023-11-28 PROCEDURE — 1036F TOBACCO NON-USER: CPT | Performed by: PHYSICIAN ASSISTANT

## 2023-11-28 PROCEDURE — G8427 DOCREV CUR MEDS BY ELIG CLIN: HCPCS | Performed by: PHYSICIAN ASSISTANT

## 2023-11-28 RX ORDER — CLINDAMYCIN PHOSPHATE 10 MG/G
GEL TOPICAL
Qty: 60 G | Refills: 2 | Status: SHIPPED | OUTPATIENT
Start: 2023-11-28 | End: 2023-12-05

## 2023-11-28 RX ORDER — SULFACETAMIDE SODIUM 100 MG/ML
LOTION TOPICAL
Qty: 118 ML | Refills: 2 | Status: SHIPPED | OUTPATIENT
Start: 2023-11-28

## 2023-11-28 NOTE — PROGRESS NOTES
11/28/2023    [id number] [facility]    Chief Complaint   Patient presents with    6 Month Follow-Up       HPI  History was obtained from pt. Jennifer Collins is a 34 y.o. female with a PMHx as listed below who presents today for 1 month follow up    Pt has no acute concerns. Cervical lymph node - On 11/3 pt complained of painful lump on her right posterior neck. Labs were all WNL. Pt states the lump is still present but no longer hurts. She has not noticed a change in size. Acne - Pt began on benzoyl peroxide and tretinoin on 11/3. Pt had burning and peeling of her skin after 5 days on the regimen. She has been using it every other day since then and the burning/peeling has stopped but she states she has not noticed much of a change in her acne. 1. Lymphadenopathy of right cervical region    2.  Pustular acne         REVIEW OF SYMPTOMS    Review of Systems    PAST MEDICAL HISTORY  Past Medical History:   Diagnosis Date    H/O cardiovascular stress test 02/26/2021    normal stress    H/O echocardiogram 02/23/2021    EF 55-60% no evidence of pericardial effusion no significant valvular abnormalities    Hypotension        FAMILY HISTORY  Family History   Problem Relation Age of Onset    Heart Disease Mother         pacemaker    Migraines Mother     Coronary Art Dis Other         GRANDFATHER AND GREAT GRANDFATHER (NOT SPECIFIED)    Diabetes Other         GRANDMOTHER - NON INSULIN DEPENDNET       SOCIAL HISTORY  Social History     Socioeconomic History    Marital status: Single     Spouse name: None    Number of children: None    Years of education: None    Highest education level: None   Tobacco Use    Smoking status: Never    Smokeless tobacco: Never   Vaping Use    Vaping Use: Never used   Substance and Sexual Activity    Alcohol use: No    Drug use: No    Sexual activity: Yes     Partners: Male     Social Determinants of Health     Financial Resource Strain: Low Risk  (4/2/2021)    Overall Financial Resource Strain

## 2023-12-04 ENCOUNTER — HOSPITAL ENCOUNTER (OUTPATIENT)
Dept: ULTRASOUND IMAGING | Age: 28
Discharge: HOME | End: 2023-12-04
Payer: COMMERCIAL

## 2023-12-04 DIAGNOSIS — R59.0 LYMPHADENOPATHY OF RIGHT CERVICAL REGION: ICD-10-CM

## 2023-12-04 PROCEDURE — 76882 US LMTD JT/FCL EVL NVASC XTR: CPT

## 2023-12-06 ENCOUNTER — TELEPHONE (OUTPATIENT)
Dept: ORTHOPEDIC SURGERY | Age: 28
End: 2023-12-06

## 2023-12-06 ENCOUNTER — OFFICE VISIT (OUTPATIENT)
Dept: ORTHOPEDIC SURGERY | Age: 28
End: 2023-12-06
Payer: COMMERCIAL

## 2023-12-06 VITALS
WEIGHT: 133 LBS | HEART RATE: 109 BPM | HEIGHT: 63 IN | BODY MASS INDEX: 23.57 KG/M2 | OXYGEN SATURATION: 97 % | RESPIRATION RATE: 16 BRPM

## 2023-12-06 DIAGNOSIS — M17.11 PATELLOFEMORAL ARTHRITIS OF RIGHT KNEE: Primary | ICD-10-CM

## 2023-12-06 PROCEDURE — G8427 DOCREV CUR MEDS BY ELIG CLIN: HCPCS | Performed by: PHYSICIAN ASSISTANT

## 2023-12-06 PROCEDURE — 1036F TOBACCO NON-USER: CPT | Performed by: PHYSICIAN ASSISTANT

## 2023-12-06 PROCEDURE — G8420 CALC BMI NORM PARAMETERS: HCPCS | Performed by: PHYSICIAN ASSISTANT

## 2023-12-06 PROCEDURE — 99213 OFFICE O/P EST LOW 20 MIN: CPT | Performed by: PHYSICIAN ASSISTANT

## 2023-12-06 PROCEDURE — G8482 FLU IMMUNIZE ORDER/ADMIN: HCPCS | Performed by: PHYSICIAN ASSISTANT

## 2023-12-06 RX ORDER — PREDNISONE 20 MG/1
20 TABLET ORAL 2 TIMES DAILY
Qty: 14 TABLET | Refills: 0 | Status: SHIPPED | OUTPATIENT
Start: 2023-12-06 | End: 2023-12-13

## 2023-12-06 ASSESSMENT — ENCOUNTER SYMPTOMS
EYES NEGATIVE: 1
GASTROINTESTINAL NEGATIVE: 1
RESPIRATORY NEGATIVE: 1

## 2023-12-06 NOTE — PROGRESS NOTES
Review of Systems   Constitutional: Negative. HENT: Negative. Eyes: Negative. Respiratory: Negative. Cardiovascular: Negative. Gastrointestinal: Negative. Genitourinary: Negative. Musculoskeletal:  Positive for arthralgias. Skin: Negative. Negative for rash and wound. Neurological: Negative. Psychiatric/Behavioral: Negative. Previous HPI:  Bud Castanon is a 34 y.o. female who presents to the office today complaining of right knee pain. 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 She did have chondroplasty of the patella given the fact that she had a central defect within the patella at that time. She states that the knee has been bothering her more recently. She states that about a month ago she accidentally shut her knee in her car door. It has not gotten significantly better. She states that with stairs her knee pain is a 7 or an 8/10. At rest her knee pain is a 2/10. Current HPI: Elmer Gan is a 53-year-old female that returns the office today complaining of right knee pain. She previously had an arthroscopy of the right knee back in June 2023 and then subsequently had an injection to the right knee the last time I saw her in October. She continues to have right knee pain. Pain is worse with going up and down stairs or squatting. She rates her pain at a 6/10 sometimes worse.       Past Medical History:   Diagnosis Date    H/O cardiovascular stress test 02/26/2021    normal stress    H/O echocardiogram 02/23/2021    EF 55-60% no evidence of pericardial effusion no significant valvular abnormalities    Hypotension        Past Surgical History:   Procedure Laterality Date    APPENDECTOMY  08/2017    COLONOSCOPY  2019    KNEE ARTHROSCOPY Left 10/20/2022    LEFT KNEE ARTHROSCOPY WITH CHONDROPLASTY performed by Imelda Best DO at [address] [place] [address] ARTHROSCOPY Right 6/8/2023    RIGHT KNEE ARTHROSCOPY WITH

## 2023-12-20 NOTE — TELEPHONE ENCOUNTER
Gel Injections Denied by [facility] due to not enough evidence on x-ray to support condition.

## 2024-01-03 ENCOUNTER — OFFICE VISIT (OUTPATIENT)
Dept: NEUROLOGY | Age: 29
End: 2024-01-03
Payer: COMMERCIAL

## 2024-01-03 VITALS
SYSTOLIC BLOOD PRESSURE: 118 MMHG | OXYGEN SATURATION: 98 % | BODY MASS INDEX: 22.15 KG/M2 | DIASTOLIC BLOOD PRESSURE: 80 MMHG | HEART RATE: 85 BPM | HEIGHT: 63 IN | WEIGHT: 125 LBS

## 2024-01-03 DIAGNOSIS — G43.109 MIGRAINE WITH AURA AND WITHOUT STATUS MIGRAINOSUS, NOT INTRACTABLE: Primary | ICD-10-CM

## 2024-01-03 PROCEDURE — G8482 FLU IMMUNIZE ORDER/ADMIN: HCPCS | Performed by: NURSE PRACTITIONER

## 2024-01-03 PROCEDURE — 99213 OFFICE O/P EST LOW 20 MIN: CPT | Performed by: NURSE PRACTITIONER

## 2024-01-03 PROCEDURE — G8427 DOCREV CUR MEDS BY ELIG CLIN: HCPCS | Performed by: NURSE PRACTITIONER

## 2024-01-03 PROCEDURE — 1036F TOBACCO NON-USER: CPT | Performed by: NURSE PRACTITIONER

## 2024-01-03 PROCEDURE — G8420 CALC BMI NORM PARAMETERS: HCPCS | Performed by: NURSE PRACTITIONER

## 2024-01-03 RX ORDER — TIZANIDINE 2 MG/1
2 TABLET ORAL NIGHTLY
Qty: 30 TABLET | Refills: 5 | Status: SHIPPED | OUTPATIENT
Start: 2024-01-03

## 2024-01-03 NOTE — PROGRESS NOTES
1/3/24    Skylar Tello  1995    Chief Complaint   Patient presents with    Follow-up     Patient presents for follow-up for migraine.        History of Present Illness  Skylar is a 28 y.o. female presenting today for follow-up of: Migraine with aura, medication overuse headache.  MRI brain nonacute.  She tried Cymbalta but had unwanted side effects so we tried tizanidine 4 mg at bedtime last visit.  She has not tried Maxalt for abortive therapy.    Today, her migraines well-controlled on tizanidine.  She reports 3/month.  She actually takes 2 mg of tizanidine at night as 4 mg made her feel too drowsy.She tried Maxalt but it made her nose burn and made her nauseated. She is using Tylenol for abortive therapy which works well her her.     Baseline headache frequency: 30/30 days per month  Baseline migraine frequency: 4/30 days per month     Prior Preventative medications tried: topamax, buspar, Cymbalta  Prior abortive medications tried: tylenol, ibuprofen, maxalt (side effects)     Current preventative: Tizanidine  Current abortive: Maxalt  Current Outpatient Medications   Medication Sig Dispense Refill    tiZANidine (ZANAFLEX) 2 MG tablet Take 1 tablet by mouth at bedtime 30 tablet 5    Benzoyl Peroxide 2.5 % GEL Apply in the morning after cleansing face with gentle cleanser 60 g 2    Sulfacetamide Sodium, Acne, 10 % LOTN Apply topically daily (Patient not taking: Reported on 1/3/2024) 118 mL 2    hydrOXYzine HCl (ATARAX) 25 MG tablet 1-2 tablets as needed for anxiety/motion sickness (Patient not taking: Reported on 11/28/2023) 10 tablet 0    tretinoin (ALTRALIN) 0.05 % gel Apply at night after  washing face with a gentle cleanser (Patient not taking: Reported on 1/3/2024) 45 g 2    acetaminophen (TYLENOL) 325 MG tablet Take 2 tablets by mouth every 6 hours as needed for Pain (Patient not taking: Reported on 12/6/2023)      IUD'S IU by IntraUTERine route (Patient not taking: Reported on 1/3/2024)       No

## 2024-01-11 ENCOUNTER — OFFICE VISIT (OUTPATIENT)
Dept: FAMILY MEDICINE CLINIC | Age: 29
End: 2024-01-11
Payer: COMMERCIAL

## 2024-01-11 VITALS
WEIGHT: 126 LBS | HEIGHT: 63 IN | SYSTOLIC BLOOD PRESSURE: 92 MMHG | HEART RATE: 111 BPM | OXYGEN SATURATION: 94 % | BODY MASS INDEX: 22.32 KG/M2 | DIASTOLIC BLOOD PRESSURE: 62 MMHG

## 2024-01-11 DIAGNOSIS — Z55.9 SCHOOL PROBLEM: ICD-10-CM

## 2024-01-11 DIAGNOSIS — F41.9 ANXIETY: Primary | ICD-10-CM

## 2024-01-11 DIAGNOSIS — R41.840 INATTENTION: ICD-10-CM

## 2024-01-11 DIAGNOSIS — F41.9 ANXIETY: ICD-10-CM

## 2024-01-11 LAB — TSH SERPL DL<=0.005 MIU/L-ACNC: 0.87 UIU/ML (ref 0.27–4.2)

## 2024-01-11 PROCEDURE — 99214 OFFICE O/P EST MOD 30 MIN: CPT | Performed by: PHYSICIAN ASSISTANT

## 2024-01-11 PROCEDURE — G8420 CALC BMI NORM PARAMETERS: HCPCS | Performed by: PHYSICIAN ASSISTANT

## 2024-01-11 PROCEDURE — 1036F TOBACCO NON-USER: CPT | Performed by: PHYSICIAN ASSISTANT

## 2024-01-11 PROCEDURE — G8482 FLU IMMUNIZE ORDER/ADMIN: HCPCS | Performed by: PHYSICIAN ASSISTANT

## 2024-01-11 PROCEDURE — G8427 DOCREV CUR MEDS BY ELIG CLIN: HCPCS | Performed by: PHYSICIAN ASSISTANT

## 2024-01-11 RX ORDER — BUPROPION HYDROCHLORIDE 150 MG/1
TABLET ORAL
Qty: 49 TABLET | Refills: 0 | Status: SHIPPED | OUTPATIENT
Start: 2024-01-11 | End: 2024-02-08

## 2024-01-11 SDOH — EDUCATIONAL SECURITY - EDUCATION ATTAINMENT: PROBLEMS RELATED TO EDUCATION AND LITERACY, UNSPECIFIED: Z55.9

## 2024-01-11 ASSESSMENT — ANXIETY QUESTIONNAIRES
2. NOT BEING ABLE TO STOP OR CONTROL WORRYING: 0
6. BECOMING EASILY ANNOYED OR IRRITABLE: 3
IF YOU CHECKED OFF ANY PROBLEMS ON THIS QUESTIONNAIRE, HOW DIFFICULT HAVE THESE PROBLEMS MADE IT FOR YOU TO DO YOUR WORK, TAKE CARE OF THINGS AT HOME, OR GET ALONG WITH OTHER PEOPLE: SOMEWHAT DIFFICULT
4. TROUBLE RELAXING: 1
1. FEELING NERVOUS, ANXIOUS, OR ON EDGE: 3
5. BEING SO RESTLESS THAT IT IS HARD TO SIT STILL: 0
3. WORRYING TOO MUCH ABOUT DIFFERENT THINGS: 1
GAD7 TOTAL SCORE: 11
7. FEELING AFRAID AS IF SOMETHING AWFUL MIGHT HAPPEN: 3

## 2024-01-11 NOTE — PROGRESS NOTES
tablet Take 2 tablets by mouth every 6 hours as needed for Pain (Patient not taking: Reported on 12/6/2023)      IUD'S IU by IntraUTERine route (Patient not taking: Reported on 1/3/2024)       No current facility-administered medications for this visit.       ALLERGIES  Allergies   Allergen Reactions    Estrogens      Migraines with aura    Adhesive Tape Itching and Rash       PHYSICAL EXAM    BP 92/62 (Site: Left Upper Arm, Position: Sitting, Cuff Size: Medium Adult)   Pulse (!) 111   Ht 1.6 m (5' 3\")   Wt 57.2 kg (126 lb)   SpO2 94%   BMI 22.32 kg/m²     Constitutional:  Well developed, well nourished.  No acute distress.  HENT:  Normocephalic, atraumatic  Eyes:  conjunctiva normal, no discharge, no scleral icterus  Neck:  Normal range of motion, no tenderness, supple, no palpable thyromegaly or thyroid nodules  Lymphatic:  No lymphadenopathy noted  Cardiovascular:  Normal heart rate, normal rhythm, no murmurs, gallops or rubs  Thorax & Lungs:  Normal breath sounds, no respiratory distress, no wheezing, no rales, no rhonchi  Skin:  Warm, dry, no erythema, no rash  Neurologic:  Alert & oriented   Psychiatric:  Affect normal, mood normal    ASSESSMENT & PLAN    Skylar was seen today for anxiety.    Diagnoses and all orders for this visit:    Anxiety  -     buPROPion (WELLBUTRIN XL) 150 MG extended release tablet; Take 1 tablet by mouth every morning for 7 days, THEN 2 tablets every morning for 21 days.  -     TSH with Reflex to FT4; Future    Inattention  -     buPROPion (WELLBUTRIN XL) 150 MG extended release tablet; Take 1 tablet by mouth every morning for 7 days, THEN 2 tablets every morning for 21 days.    School problem       Obtain TSH.  Initiate Wellbutrin 150 mg once daily for the next 7 days and then increase to 300 mg daily from there.  Plan to follow-up in office in 4 weeks.    There are no discontinued medications.     No follow-ups on file.     Plan of care reviewed with patient who verbalizes

## 2024-02-21 ENCOUNTER — TELEPHONE (OUTPATIENT)
Dept: ORTHOPEDIC SURGERY | Age: 29
End: 2024-02-21

## 2024-02-21 ENCOUNTER — OFFICE VISIT (OUTPATIENT)
Dept: ORTHOPEDIC SURGERY | Age: 29
End: 2024-02-21
Payer: COMMERCIAL

## 2024-02-21 VITALS
HEART RATE: 68 BPM | BODY MASS INDEX: 23.04 KG/M2 | WEIGHT: 130 LBS | RESPIRATION RATE: 12 BRPM | OXYGEN SATURATION: 98 % | HEIGHT: 63 IN

## 2024-02-21 DIAGNOSIS — M22.41 CHONDROMALACIA PATELLAE, RIGHT KNEE: Primary | ICD-10-CM

## 2024-02-21 PROCEDURE — G8482 FLU IMMUNIZE ORDER/ADMIN: HCPCS | Performed by: ORTHOPAEDIC SURGERY

## 2024-02-21 PROCEDURE — 1036F TOBACCO NON-USER: CPT | Performed by: ORTHOPAEDIC SURGERY

## 2024-02-21 PROCEDURE — G8427 DOCREV CUR MEDS BY ELIG CLIN: HCPCS | Performed by: ORTHOPAEDIC SURGERY

## 2024-02-21 PROCEDURE — G8420 CALC BMI NORM PARAMETERS: HCPCS | Performed by: ORTHOPAEDIC SURGERY

## 2024-02-21 PROCEDURE — 99213 OFFICE O/P EST LOW 20 MIN: CPT | Performed by: ORTHOPAEDIC SURGERY

## 2024-02-21 ASSESSMENT — ENCOUNTER SYMPTOMS
BACK PAIN: 0
CHEST TIGHTNESS: 0
COLOR CHANGE: 0
ABDOMINAL PAIN: 0
EYE REDNESS: 0

## 2024-02-21 NOTE — PROGRESS NOTES
Patient returns to the office with right knee pain. Pt stated it has gotten significantly worse recently at rest her pain is about a 3/10 but with any movement she feels sharp shooting pains that start at the patella and radiate proximally up her leg. Pt stated that she has tried OTC medication, ice, heat and the previous steroid injection did not help.   
iovera injections.  I will get her a referral to Dr. Mendez for iovera injection for the right knee.  Continue weight-bearing as tolerated.  Continue range of motion exercises as instructed.  Ice and elevate as needed.  Tylenol or Motrin for pain.  Follow up as needed.          ALEAH VARELA,

## 2024-02-21 NOTE — TELEPHONE ENCOUNTER
Patient has a hx of a right knee arthroscopy and steroid injections in the right knee with no long-term benefits. She was denied for gel injections and was wanting to try an Iovera shot. Please call patient to get scheduled.

## 2024-02-26 NOTE — TELEPHONE ENCOUNTER
Iovera is not a covered benefit for [facility]. Out of pocket cost 825.00 for one leg and then bilateral would be $1,515.

## 2024-02-28 NOTE — TELEPHONE ENCOUNTER
Carmenzat scheduled with Dr Mendez for consultation. If injections are denied again with the submission of his clinical documentation I can then schedule a peer to peer the her insurance.

## 2024-03-06 ENCOUNTER — OFFICE VISIT (OUTPATIENT)
Dept: FAMILY MEDICINE CLINIC | Age: 29
End: 2024-03-06
Payer: COMMERCIAL

## 2024-03-06 VITALS
DIASTOLIC BLOOD PRESSURE: 66 MMHG | HEART RATE: 70 BPM | WEIGHT: 132 LBS | HEIGHT: 63 IN | BODY MASS INDEX: 23.39 KG/M2 | OXYGEN SATURATION: 96 % | SYSTOLIC BLOOD PRESSURE: 98 MMHG

## 2024-03-06 DIAGNOSIS — R07.9 INTERMITTENT CHEST PAIN: Primary | ICD-10-CM

## 2024-03-06 DIAGNOSIS — F41.9 ANXIETY: ICD-10-CM

## 2024-03-06 PROCEDURE — G8420 CALC BMI NORM PARAMETERS: HCPCS | Performed by: PHYSICIAN ASSISTANT

## 2024-03-06 PROCEDURE — G8427 DOCREV CUR MEDS BY ELIG CLIN: HCPCS | Performed by: PHYSICIAN ASSISTANT

## 2024-03-06 PROCEDURE — G8482 FLU IMMUNIZE ORDER/ADMIN: HCPCS | Performed by: PHYSICIAN ASSISTANT

## 2024-03-06 PROCEDURE — 1036F TOBACCO NON-USER: CPT | Performed by: PHYSICIAN ASSISTANT

## 2024-03-06 PROCEDURE — 99213 OFFICE O/P EST LOW 20 MIN: CPT | Performed by: PHYSICIAN ASSISTANT

## 2024-03-06 RX ORDER — MEDROXYPROGESTERONE ACETATE 150 MG/ML
150 INJECTION, SUSPENSION INTRAMUSCULAR
COMMUNITY

## 2024-03-06 NOTE — PROGRESS NOTES
3/6/2024    Skylar Tello    Chief Complaint   Patient presents with    Follow-Up from Hospital     - pt states went to ER 2/29/24 for mid to left chest pain, numbness & tingling left upper arm. Pt states states sx are random lasting up to 1 minute. Chronic, worse & more frequent over the past 1 week. 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        HPI  History was obtained from patient.   Skylar is a 28 y.o. female who presents today for ED follow-up.  Approximately 1 week ago, patient presented to the emergency department with complaints of intermittent chest pain.  She continues to have episodes of chest pain that come, last ten seconds or so and then self resolve.  This has been ongoing for at least a year.  Workup in the ED reassuring-  EKG sinus rhythm.  CTA chest no PE.  She was informed this was likely noncardiac pain and discharged home with tizanidine.    Patient does have underlying anxiety.  I saw her in office 8 weeks ago and we discussed her concerns for anxiousness and ADD.  I started her on Wellbutrin at that time and advised to 4-week follow-up but she failed to return.  Thyroid labs normal at that visit.  When questioned today, she does not believe she even started the medication.  She does not think her chest pain is anxiety driven.     She follows cardiology for orthostatic hypotension.  Last office visit appears to be in November 2022.  Was started on Florinef at that time.  Pt not sure she ever started it.  She requested her records and transferred to \[facility]\"" Cardiology.  Not sure last visit there, we do not have records. She has an upcoming appt      REVIEW OF SYMPTOMS    Constitutional:  Denies fever, chills, weight loss or weakness  ENT:  Denies URI symptoms  Cardiovascular:  Admits intermittent chest pain (points to sternal region), see HPI.  Denies palpitations or swelling  Respiratory:  Denies cough or shortness of breath  GI:  Denies abdominal pain, nausea,

## 2024-04-03 ENCOUNTER — OFFICE VISIT (OUTPATIENT)
Dept: ORTHOPEDIC SURGERY | Age: 29
End: 2024-04-03
Payer: COMMERCIAL

## 2024-04-03 ENCOUNTER — TELEPHONE (OUTPATIENT)
Dept: ORTHOPEDIC SURGERY | Age: 29
End: 2024-04-03

## 2024-04-03 VITALS — OXYGEN SATURATION: 98 % | DIASTOLIC BLOOD PRESSURE: 72 MMHG | HEART RATE: 86 BPM | SYSTOLIC BLOOD PRESSURE: 102 MMHG

## 2024-04-03 DIAGNOSIS — M25.561 PATELLOFEMORAL ARTHRALGIA OF RIGHT KNEE: Primary | ICD-10-CM

## 2024-04-03 PROCEDURE — G8420 CALC BMI NORM PARAMETERS: HCPCS | Performed by: STUDENT IN AN ORGANIZED HEALTH CARE EDUCATION/TRAINING PROGRAM

## 2024-04-03 PROCEDURE — G8427 DOCREV CUR MEDS BY ELIG CLIN: HCPCS | Performed by: STUDENT IN AN ORGANIZED HEALTH CARE EDUCATION/TRAINING PROGRAM

## 2024-04-03 PROCEDURE — 1036F TOBACCO NON-USER: CPT | Performed by: STUDENT IN AN ORGANIZED HEALTH CARE EDUCATION/TRAINING PROGRAM

## 2024-04-03 PROCEDURE — 99213 OFFICE O/P EST LOW 20 MIN: CPT | Performed by: STUDENT IN AN ORGANIZED HEALTH CARE EDUCATION/TRAINING PROGRAM

## 2024-04-03 ASSESSMENT — ENCOUNTER SYMPTOMS
VOICE CHANGE: 0
BACK PAIN: 0
VOMITING: 0
WHEEZING: 0
COUGH: 0
SORE THROAT: 0
COLOR CHANGE: 0
SHORTNESS OF BREATH: 0

## 2024-04-03 NOTE — PATIENT INSTRUCTIONS
We will call with approval status of gel injections. If denied, we will refer you to [facility].

## 2024-04-03 NOTE — PROGRESS NOTES
New patient is here for consult on right knee pain.    PMHx right knee arthroscopy with chondroplasty of patella with Dr. Powell in June 2023. She had the identical procedure performed in her left knee in 2022, which feels fine.     Denies acute injury to right knee. Pain is infrapatellar and shoots down her leg. Cortisone shot ineffective for right knee pain. Was set to discuss gel injections and iovera. Both were denies by insurance.   
General: She is not in acute distress.     Appearance: Normal appearance. She is normal weight.   HENT:      Head: Normocephalic and atraumatic.   Eyes:      General:         Right eye: No discharge.         Left eye: No discharge.      Extraocular Movements: Extraocular movements intact.   Cardiovascular:      Pulses: Normal pulses.   Pulmonary:      Effort: Pulmonary effort is normal.      Breath sounds: Normal breath sounds.   Musculoskeletal:         General: Swelling and tenderness present. No deformity or signs of injury.      Cervical back: Normal range of motion.      Right hip: Normal.      Left hip: Normal.      Right upper leg: Normal.      Left upper leg: Normal.      Right knee: Bony tenderness and crepitus present. No swelling, deformity, effusion, erythema, ecchymosis or lacerations. Normal range of motion. Tenderness present. No medial joint line or lateral joint line tenderness. No LCL laxity, MCL laxity, ACL laxity or PCL laxity. Normal alignment, normal meniscus and normal patellar mobility. Normal pulse.      Instability Tests: Anterior drawer test negative. Posterior drawer test negative. Anterior Lachman test negative. Medial Tori test negative and lateral Tori test negative.      Left knee: Normal.      Right lower leg: Normal. No edema.      Left lower leg: Normal. No edema.      Right ankle: Normal.      Left ankle: Normal.      Right foot: Normal.      Left foot: Normal.   Skin:     General: Skin is warm and dry.      Capillary Refill: Capillary refill takes less than 2 seconds.   Neurological:      General: No focal deficit present.      Mental Status: She is alert and oriented to person, place, and time. Mental status is at baseline.      Sensory: No sensory deficit.      Motor: No weakness.      Gait: Gait normal.   Psychiatric:         Mood and Affect: Mood normal.         Behavior: Behavior normal.        RIGHT KNEE EXAMINATION       OBSERVATION / INSPECTION     Gait:

## 2024-04-03 NOTE — TELEPHONE ENCOUNTER
We are going to try to have gel injections approved again from Dr. Mendez.     If not approved, we are sending her to [facility].

## 2024-04-08 NOTE — TELEPHONE ENCOUNTER
Health Maintenance Due   Topic Date Due   â¢ Shingles Vaccine (1 of 2) Never done   â¢ Lung Cancer Screening  Never done       Patient is due for topics as listed above but is not proceeding with Immunization(s) Shingles at this time. Precert form refaxed with Dr Mendez's information - with supporting office note.

## 2024-04-10 ENCOUNTER — TELEPHONE (OUTPATIENT)
Dept: ORTHOPEDIC SURGERY | Age: 29
End: 2024-04-10

## 2024-04-17 DIAGNOSIS — R41.840 INATTENTION: ICD-10-CM

## 2024-04-17 DIAGNOSIS — F41.9 ANXIETY: ICD-10-CM

## 2024-04-17 RX ORDER — BUPROPION HYDROCHLORIDE 150 MG/1
TABLET ORAL
Qty: 49 TABLET | Refills: 0 | Status: SHIPPED | OUTPATIENT
Start: 2024-04-17 | End: 2024-05-14

## 2024-04-25 NOTE — TELEPHONE ENCOUNTER
I called [facility] and spoke with Lubna who informed me that the appeal was received on 4/18/24 and is currently in process. Due date for a decision: 4/28/24.     Appeal ID Ref# [id number]    Call Ref: [id number]

## 2024-04-29 NOTE — TELEPHONE ENCOUNTER
Gelsyn3 is in stock and labeled.    Tazorac Pregnancy And Lactation Text: This medication is not safe during pregnancy. It is unknown if this medication is excreted in breast milk.

## 2024-05-13 ENCOUNTER — OFFICE VISIT (OUTPATIENT)
Dept: FAMILY MEDICINE CLINIC | Age: 29
End: 2024-05-13
Payer: COMMERCIAL

## 2024-05-13 VITALS
OXYGEN SATURATION: 98 % | DIASTOLIC BLOOD PRESSURE: 62 MMHG | BODY MASS INDEX: 24.38 KG/M2 | HEIGHT: 63 IN | HEART RATE: 72 BPM | RESPIRATION RATE: 19 BRPM | SYSTOLIC BLOOD PRESSURE: 112 MMHG | WEIGHT: 137.6 LBS

## 2024-05-13 DIAGNOSIS — M79.672 LEFT FOOT PAIN: ICD-10-CM

## 2024-05-13 DIAGNOSIS — L70.0 PUSTULAR ACNE: ICD-10-CM

## 2024-05-13 DIAGNOSIS — R41.840 INATTENTION: ICD-10-CM

## 2024-05-13 DIAGNOSIS — S90.32XA CONTUSION OF LEFT FOOT, INITIAL ENCOUNTER: ICD-10-CM

## 2024-05-13 DIAGNOSIS — F41.9 ANXIETY: ICD-10-CM

## 2024-05-13 DIAGNOSIS — F41.9 ANXIETY: Primary | ICD-10-CM

## 2024-05-13 PROCEDURE — G8420 CALC BMI NORM PARAMETERS: HCPCS | Performed by: PHYSICIAN ASSISTANT

## 2024-05-13 PROCEDURE — G8427 DOCREV CUR MEDS BY ELIG CLIN: HCPCS | Performed by: PHYSICIAN ASSISTANT

## 2024-05-13 PROCEDURE — 99214 OFFICE O/P EST MOD 30 MIN: CPT | Performed by: PHYSICIAN ASSISTANT

## 2024-05-13 PROCEDURE — 1036F TOBACCO NON-USER: CPT | Performed by: PHYSICIAN ASSISTANT

## 2024-05-13 RX ORDER — BUPROPION HYDROCHLORIDE 300 MG/1
300 TABLET ORAL EVERY MORNING
Qty: 90 TABLET | Refills: 1 | Status: SHIPPED | OUTPATIENT
Start: 2024-05-13

## 2024-05-13 RX ORDER — BENZOYL PEROXIDE 2.5 G/100G
GEL TOPICAL
Qty: 60 G | Refills: 2 | Status: SHIPPED | OUTPATIENT
Start: 2024-05-13

## 2024-05-13 RX ORDER — BUPROPION HYDROCHLORIDE 150 MG/1
TABLET ORAL
Qty: 49 TABLET | Refills: 0 | OUTPATIENT
Start: 2024-05-13

## 2024-05-13 SDOH — ECONOMIC STABILITY: HOUSING INSECURITY
IN THE LAST 12 MONTHS, WAS THERE A TIME WHEN YOU DID NOT HAVE A STEADY PLACE TO SLEEP OR SLEPT IN A SHELTER (INCLUDING NOW)?: NO

## 2024-05-13 SDOH — ECONOMIC STABILITY: FOOD INSECURITY: WITHIN THE PAST 12 MONTHS, YOU WORRIED THAT YOUR FOOD WOULD RUN OUT BEFORE YOU GOT MONEY TO BUY MORE.: NEVER TRUE

## 2024-05-13 SDOH — ECONOMIC STABILITY: FOOD INSECURITY: WITHIN THE PAST 12 MONTHS, THE FOOD YOU BOUGHT JUST DIDN'T LAST AND YOU DIDN'T HAVE MONEY TO GET MORE.: NEVER TRUE

## 2024-05-13 SDOH — ECONOMIC STABILITY: INCOME INSECURITY: HOW HARD IS IT FOR YOU TO PAY FOR THE VERY BASICS LIKE FOOD, HOUSING, MEDICAL CARE, AND HEATING?: NOT HARD AT ALL

## 2024-05-13 ASSESSMENT — PATIENT HEALTH QUESTIONNAIRE - PHQ9
SUM OF ALL RESPONSES TO PHQ QUESTIONS 1-9: 0
SUM OF ALL RESPONSES TO PHQ QUESTIONS 1-9: 0
SUM OF ALL RESPONSES TO PHQ9 QUESTIONS 1 & 2: 0
SUM OF ALL RESPONSES TO PHQ QUESTIONS 1-9: 0
1. LITTLE INTEREST OR PLEASURE IN DOING THINGS: NOT AT ALL
2. FEELING DOWN, DEPRESSED OR HOPELESS: NOT AT ALL
SUM OF ALL RESPONSES TO PHQ QUESTIONS 1-9: 0

## 2024-05-13 NOTE — PROGRESS NOTES
5/13/2024    Skylar Tello    Chief Complaint   Patient presents with    Follow-up     On Meds    Foot Injury     Yesterday w wood pile on Lt foot       HPI  History was obtained from patient  Skylar is a 28 y.o. female who presents today for follow-up    I saw patient in the office 4 months ago with complaints of anxiety and inattentiveness.  I sent in a prescription for Wellbutrin but patient failed to start it.  She then came to the office 2 months ago with the same concerns after she apparently developed chest pain, went to the emergency department.  ED visit reassuring and she was told it was likely noncardiac pain.  She was agreeable to starting the Wellbutrin at that time as we discussed that the chest pain may be anxiety driven.  She is here today for a refill of this medication.    She follows cardiologist Dr. Eula Nicole now-no issues with chest pain.    Pt mentions she was at the ER yesterday due to left foot injury.  An hour prior to her arrival to ED, she had a wooden post fall onto her foot.  X-ray was negative for any acute findings.  She is in a boot.  She continues to have pain but does feel better when she is in the boot.  She is taking ibuprofen as needed for pain/swelling.  She is also applying ice periodically throughout the day.    Patient would also like a refill of benzyl peroxide gel.  History of pustular acne.          PAST MEDICAL HISTORY  Past Medical History:   Diagnosis Date    H/O cardiovascular stress test 02/26/2021    normal stress    H/O echocardiogram 02/23/2021    EF 55-60% no evidence of pericardial effusion no significant valvular abnormalities    Hypotension        FAMILY HISTORY  Family History   Problem Relation Age of Onset    Heart Disease Mother         pacemaker    Migraines Mother     Coronary Art Dis Other         GRANDFATHER AND GREAT GRANDFATHER (NOT SPECIFIED)    Diabetes Other         GRANDMOTHER - NON INSULIN DEPENDNET       SOCIAL HISTORY  Social History

## 2024-06-13 ENCOUNTER — OFFICE VISIT (OUTPATIENT)
Dept: FAMILY MEDICINE CLINIC | Age: 29
End: 2024-06-13
Payer: COMMERCIAL

## 2024-06-13 VITALS
SYSTOLIC BLOOD PRESSURE: 110 MMHG | OXYGEN SATURATION: 97 % | WEIGHT: 128.6 LBS | DIASTOLIC BLOOD PRESSURE: 62 MMHG | BODY MASS INDEX: 22.79 KG/M2 | HEIGHT: 63 IN | HEART RATE: 79 BPM

## 2024-06-13 DIAGNOSIS — B37.31 VULVOVAGINAL CANDIDIASIS: Primary | ICD-10-CM

## 2024-06-13 PROCEDURE — 99212 OFFICE O/P EST SF 10 MIN: CPT | Performed by: PHYSICIAN ASSISTANT

## 2024-06-13 PROCEDURE — 1036F TOBACCO NON-USER: CPT | Performed by: PHYSICIAN ASSISTANT

## 2024-06-13 PROCEDURE — G8420 CALC BMI NORM PARAMETERS: HCPCS | Performed by: PHYSICIAN ASSISTANT

## 2024-06-13 PROCEDURE — G8427 DOCREV CUR MEDS BY ELIG CLIN: HCPCS | Performed by: PHYSICIAN ASSISTANT

## 2024-06-13 RX ORDER — FLUCONAZOLE 150 MG/1
TABLET ORAL
Qty: 3 TABLET | Refills: 0 | Status: SHIPPED | OUTPATIENT
Start: 2024-06-13

## 2024-06-13 NOTE — PROGRESS NOTES
for vaginitis.    Diagnoses and all orders for this visit:    Vulvovaginal candidiasis  -     fluconazole (DIFLUCAN) 150 MG tablet; Take one tablet po every 3 days.       We will treat for presumed vulvovaginal candidiasis with Diflucan.  Pt will call the office if no improvement within the next 1 week.      There are no discontinued medications.     No follow-ups on file.     Plan of care reviewed with patient who verbalizes understanding and wishes to continue.                    Please note that this chart was generated using dragon dictation software.  Although every effort was made to ensure the accuracy of this automated transcription, some errors in transcription may have occurred.    Electronically signed by MICHELLE JOSUE PA-C on 6/13/2024

## 2024-07-20 DIAGNOSIS — B37.31 VULVOVAGINAL CANDIDIASIS: Primary | ICD-10-CM

## 2024-07-20 RX ORDER — FLUCONAZOLE 150 MG/1
150 TABLET ORAL
Qty: 2 TABLET | Refills: 0 | Status: SHIPPED | OUTPATIENT
Start: 2024-07-20 | End: 2024-07-26

## 2024-07-20 RX ORDER — NYSTATIN 100000 U/G
CREAM TOPICAL
Qty: 90 G | Refills: 2 | Status: SHIPPED | OUTPATIENT
Start: 2024-07-20

## 2024-07-31 ENCOUNTER — OFFICE VISIT (OUTPATIENT)
Dept: FAMILY MEDICINE CLINIC | Age: 29
End: 2024-07-31
Payer: COMMERCIAL

## 2024-07-31 VITALS
SYSTOLIC BLOOD PRESSURE: 110 MMHG | OXYGEN SATURATION: 97 % | HEART RATE: 83 BPM | WEIGHT: 132 LBS | BODY MASS INDEX: 23.39 KG/M2 | DIASTOLIC BLOOD PRESSURE: 70 MMHG

## 2024-07-31 DIAGNOSIS — N89.8 VAGINAL DISCHARGE: Primary | ICD-10-CM

## 2024-07-31 PROCEDURE — G8420 CALC BMI NORM PARAMETERS: HCPCS | Performed by: NURSE PRACTITIONER

## 2024-07-31 PROCEDURE — G8427 DOCREV CUR MEDS BY ELIG CLIN: HCPCS | Performed by: NURSE PRACTITIONER

## 2024-07-31 PROCEDURE — 1036F TOBACCO NON-USER: CPT | Performed by: NURSE PRACTITIONER

## 2024-07-31 PROCEDURE — 99213 OFFICE O/P EST LOW 20 MIN: CPT | Performed by: NURSE PRACTITIONER

## 2024-07-31 ASSESSMENT — ENCOUNTER SYMPTOMS: RESPIRATORY NEGATIVE: 1

## 2024-07-31 NOTE — PROGRESS NOTES
Skylar Tello   28 y.o.  female  [id number]      Chief Complaint   Patient presents with    Vaginal Discharge     Pt reports abnormal discharge and odor, thinks it may be BV.   Pt is sexually active with one partner.         Subjective:  28 y.o.female is here for an office visit. She has the following chronic/acute medical problems:  Patient Active Problem List   Diagnosis    Migraine aura without headache    Syncope and collapse    Orthostatic hypotension    Patellofemoral arthralgia of right knee       HPI concern for BV. Has some discharge and unusual odor. Started 2 days ago. No fever pain or other concers. No concern for STIs or exposure    Review of Systems   Constitutional: Negative.  Negative for chills, diaphoresis and fever.   HENT: Negative.     Respiratory: Negative.     Cardiovascular: Negative.    Genitourinary:  Positive for vaginal discharge. Negative for decreased urine volume, difficulty urinating, dysuria, genital sores, hematuria, menstrual problem, urgency, vaginal bleeding and vaginal pain.       Current Outpatient Medications   Medication Sig Dispense Refill    buPROPion (WELLBUTRIN XL) 300 MG extended release tablet Take 1 tablet by mouth every morning 90 tablet 1    medroxyPROGESTERone (DEPO-PROVERA) 150 MG/ML injection Inject 1 mL into the muscle every 3 months      tiZANidine (ZANAFLEX) 2 MG tablet Take 1 tablet by mouth at bedtime 30 tablet 5    acetaminophen (TYLENOL) 325 MG tablet Take 2 tablets by mouth every 6 hours as needed for Pain      nystatin (MYCOSTATIN) 196411 UNIT/GM cream Apply (2G) topically to effected area 2 times daily for 10 days. (Patient not taking: Reported on 7/31/2024) 90 g 2    fluconazole (DIFLUCAN) 150 MG tablet Take one tablet po every 3 days. (Patient not taking: Reported on 7/31/2024) 3 tablet 0    Benzoyl Peroxide 2.5 % GEL Apply in the morning after cleansing face with gentle cleanser (Patient not taking: Reported on 7/31/2024) 60 g 2     No current

## 2024-08-01 LAB
CANDIDA DNA VAG QL NAA+PROBE: ABNORMAL
G VAGINALIS DNA SPEC QL NAA+PROBE: ABNORMAL
T VAGINALIS DNA VAG QL NAA+PROBE: ABNORMAL

## 2024-08-02 DIAGNOSIS — N76.0 BV (BACTERIAL VAGINOSIS): Primary | ICD-10-CM

## 2024-08-02 DIAGNOSIS — B96.89 BV (BACTERIAL VAGINOSIS): Primary | ICD-10-CM

## 2024-08-02 RX ORDER — METRONIDAZOLE 500 MG/1
500 TABLET ORAL 2 TIMES DAILY
Qty: 14 TABLET | Refills: 0 | Status: SHIPPED | OUTPATIENT
Start: 2024-08-02 | End: 2024-08-09

## 2024-09-12 ENCOUNTER — OFFICE VISIT (OUTPATIENT)
Dept: FAMILY MEDICINE CLINIC | Age: 29
End: 2024-09-12

## 2024-09-12 VITALS
OXYGEN SATURATION: 98 % | WEIGHT: 134.9 LBS | HEIGHT: 63 IN | HEART RATE: 86 BPM | BODY MASS INDEX: 23.9 KG/M2 | DIASTOLIC BLOOD PRESSURE: 66 MMHG | SYSTOLIC BLOOD PRESSURE: 112 MMHG

## 2024-09-12 DIAGNOSIS — Z00.00 ENCOUNTER FOR WELL ADULT EXAM WITHOUT ABNORMAL FINDINGS: ICD-10-CM

## 2024-09-12 DIAGNOSIS — Z23 ENCOUNTER FOR IMMUNIZATION: Primary | ICD-10-CM

## 2024-09-12 DIAGNOSIS — G43.109 MIGRAINE AURA WITHOUT HEADACHE: ICD-10-CM

## 2024-09-12 DIAGNOSIS — F41.9 ANXIETY: ICD-10-CM

## 2024-09-12 DIAGNOSIS — N89.8 VAGINAL DISCHARGE: ICD-10-CM

## 2024-09-12 DIAGNOSIS — E28.2 PCOS (POLYCYSTIC OVARIAN SYNDROME): ICD-10-CM

## 2024-09-12 RX ORDER — BUPROPION HYDROCHLORIDE 300 MG/1
300 TABLET ORAL EVERY MORNING
Qty: 90 TABLET | Refills: 1 | Status: SHIPPED | OUTPATIENT
Start: 2024-09-12

## 2024-09-12 RX ORDER — ATOGEPANT 60 MG/1
1 TABLET ORAL DAILY PRN
Qty: 8 TABLET | Refills: 0 | Status: SHIPPED | COMMUNITY
Start: 2024-09-12

## 2024-09-19 ASSESSMENT — ENCOUNTER SYMPTOMS
WHEEZING: 0
NAUSEA: 0
ABDOMINAL PAIN: 0
SHORTNESS OF BREATH: 0
SORE THROAT: 0

## 2024-09-26 ENCOUNTER — PATIENT MESSAGE (OUTPATIENT)
Dept: FAMILY MEDICINE CLINIC | Age: 29
End: 2024-09-26

## 2024-09-26 DIAGNOSIS — M22.41 CHONDROMALACIA PATELLAE OF RIGHT KNEE: Primary | ICD-10-CM

## 2024-11-05 ENCOUNTER — OFFICE VISIT (OUTPATIENT)
Dept: FAMILY MEDICINE CLINIC | Age: 29
End: 2024-11-05
Payer: COMMERCIAL

## 2024-11-05 VITALS
SYSTOLIC BLOOD PRESSURE: 120 MMHG | WEIGHT: 147 LBS | OXYGEN SATURATION: 97 % | DIASTOLIC BLOOD PRESSURE: 82 MMHG | HEIGHT: 62 IN | HEART RATE: 67 BPM | BODY MASS INDEX: 27.05 KG/M2

## 2024-11-05 DIAGNOSIS — H57.9 MATTERY EYE: Primary | ICD-10-CM

## 2024-11-05 PROCEDURE — 1036F TOBACCO NON-USER: CPT | Performed by: PHYSICIAN ASSISTANT

## 2024-11-05 PROCEDURE — 99212 OFFICE O/P EST SF 10 MIN: CPT | Performed by: PHYSICIAN ASSISTANT

## 2024-11-05 PROCEDURE — G8484 FLU IMMUNIZE NO ADMIN: HCPCS | Performed by: PHYSICIAN ASSISTANT

## 2024-11-05 PROCEDURE — G8427 DOCREV CUR MEDS BY ELIG CLIN: HCPCS | Performed by: PHYSICIAN ASSISTANT

## 2024-11-05 PROCEDURE — G8419 CALC BMI OUT NRM PARAM NOF/U: HCPCS | Performed by: PHYSICIAN ASSISTANT

## 2024-11-05 RX ORDER — POLYMYXIN B SULFATE AND TRIMETHOPRIM 1; 10000 MG/ML; [USP'U]/ML
1 SOLUTION OPHTHALMIC EVERY 6 HOURS
Qty: 10 ML | Refills: 0 | Status: SHIPPED | OUTPATIENT
Start: 2024-11-05 | End: 2024-11-12

## 2024-11-05 NOTE — PROGRESS NOTES
11/5/2024    Skylar Tello    Chief Complaint   Patient presents with    Conjunctivitis     - right eye redness, itching, discomfort & matting. Sx's started 11/5/24 am.        HPI  History was obtained from patient.   Skylar is a 29 y.o. female who presents today with complaints of right eye redness and green stringy drainage this morning when she woke up.  The symptoms have resolved.  She is currently not having any eye redness, itching, pain, or drainage.  She denies any recent or current URI symptoms or fevers.  She denies light sensitivity or vision changes.      PAST MEDICAL HISTORY  Past Medical History:   Diagnosis Date    H/O cardiovascular stress test 02/26/2021    normal stress    H/O echocardiogram 02/23/2021    EF 55-60% no evidence of pericardial effusion no significant valvular abnormalities    Hypotension        FAMILY HISTORY  Family History   Problem Relation Age of Onset    Heart Disease Mother         pacemaker    Migraines Mother     Coronary Art Dis Other         GRANDFATHER AND GREAT GRANDFATHER (NOT SPECIFIED)    Diabetes Other         GRANDMOTHER - NON INSULIN DEPENDNET       SOCIAL HISTORY  Social History     Socioeconomic History    Marital status: Life Partner     Spouse name: None    Number of children: None    Years of education: None    Highest education level: None   Tobacco Use    Smoking status: Never    Smokeless tobacco: Never   Vaping Use    Vaping status: Never Used   Substance and Sexual Activity    Alcohol use: No    Drug use: No    Sexual activity: Yes     Partners: Male     Social Determinants of Health     Financial Resource Strain: Low Risk  (5/13/2024)    Overall Financial Resource Strain (CARDIA)     Difficulty of Paying Living Expenses: Not hard at all   Food Insecurity: No Food Insecurity (5/13/2024)    Hunger Vital Sign     Worried About Running Out of Food in the Last Year: Never true     Ran Out of Food in the Last Year: Never true   Transportation Needs:

## 2025-02-20 ENCOUNTER — OFFICE VISIT (OUTPATIENT)
Age: 30
End: 2025-02-20
Payer: COMMERCIAL

## 2025-02-20 VITALS
HEART RATE: 105 BPM | SYSTOLIC BLOOD PRESSURE: 100 MMHG | BODY MASS INDEX: 28.64 KG/M2 | OXYGEN SATURATION: 98 % | DIASTOLIC BLOOD PRESSURE: 64 MMHG | WEIGHT: 156.6 LBS

## 2025-02-20 DIAGNOSIS — G43.109 MIGRAINE WITH AURA AND WITHOUT STATUS MIGRAINOSUS, NOT INTRACTABLE: Primary | ICD-10-CM

## 2025-02-20 PROCEDURE — G2211 COMPLEX E/M VISIT ADD ON: HCPCS | Performed by: NURSE PRACTITIONER

## 2025-02-20 PROCEDURE — 1036F TOBACCO NON-USER: CPT | Performed by: NURSE PRACTITIONER

## 2025-02-20 PROCEDURE — 99212 OFFICE O/P EST SF 10 MIN: CPT | Performed by: NURSE PRACTITIONER

## 2025-02-20 PROCEDURE — G8427 DOCREV CUR MEDS BY ELIG CLIN: HCPCS | Performed by: NURSE PRACTITIONER

## 2025-02-20 PROCEDURE — G8419 CALC BMI OUT NRM PARAM NOF/U: HCPCS | Performed by: NURSE PRACTITIONER

## 2025-02-20 PROCEDURE — 99213 OFFICE O/P EST LOW 20 MIN: CPT | Performed by: NURSE PRACTITIONER

## 2025-02-20 RX ORDER — TIZANIDINE 2 MG/1
2 TABLET ORAL NIGHTLY
Qty: 90 TABLET | Refills: 3 | Status: SHIPPED | OUTPATIENT
Start: 2025-02-20

## 2025-02-20 NOTE — PROGRESS NOTES
2/20/25    Skylar Tello  1995    Chief Complaint   Patient presents with    Follow-up     Patient here for 1 year f/u on Migraine with aura and without status migrainosus, states she has been doing well on Tizanidine. Per patient states she occasionally wakes up with a head ache        History of Present Illness  Skylar is a 29 y.o. female presenting today for follow-up of:Migraine with aura. MRI brain nonacute.  She tried Cymbalta but had unwanted side effects so we tried tizanidine 4 mg at bedtime last visit-made her too drowsy but 2 mg has been effective.  She only has about 1 migraine per month, uses Tylenol for abortive therapy.  She had side effects to Maxalt.  Is satisfied with her current migraine treatment.       Baseline headache frequency: 30/30 days per month  Baseline migraine frequency: 4/30 days per month     Prior Preventative medications tried: topamax, buspar, Cymbalta  Prior abortive medications tried: tylenol, ibuprofen, maxalt (side effects)     Current preventative: Tizanidine  Current abortive: Maxalt        Current Outpatient Medications   Medication Sig Dispense Refill    tiZANidine (ZANAFLEX) 2 MG tablet Take 1 tablet by mouth at bedtime 90 tablet 3    Benzoyl Peroxide 2.5 % GEL Apply in the morning after cleansing face with gentle cleanser 60 g 2    medroxyPROGESTERone (DEPO-PROVERA) 150 MG/ML injection Inject 1 mL into the muscle every 3 months      acetaminophen (TYLENOL) 325 MG tablet Take 2 tablets by mouth every 6 hours as needed for Pain       No current facility-administered medications for this visit.     /64   Pulse (!) 105   Wt 71 kg (156 lb 9.6 oz)   SpO2 98%   BMI 28.64 kg/m²   Physical Exam:  Mental Status: A&O to self, location, month and year, NAD, speech clear, language fluent, repetition and naming intact, follows commands appropriately     Cranial Nerve Exam:   CN II-XII: PERRL, VFF, no nystagmus, no gaze paresis, sensation V1-V3 intact b/l, muscles 
11-Jan-2017

## 2025-03-12 DIAGNOSIS — L70.0 PUSTULAR ACNE: ICD-10-CM

## 2025-03-13 RX ORDER — BENZOYL PEROXIDE 2.5 G/100G
GEL TOPICAL
Qty: 60 G | Refills: 2 | Status: SHIPPED | OUTPATIENT
Start: 2025-03-13

## 2025-06-02 ENCOUNTER — OFFICE VISIT (OUTPATIENT)
Dept: FAMILY MEDICINE CLINIC | Age: 30
End: 2025-06-02
Payer: COMMERCIAL

## 2025-06-02 VITALS
HEART RATE: 106 BPM | OXYGEN SATURATION: 97 % | BODY MASS INDEX: 31.08 KG/M2 | HEIGHT: 62 IN | SYSTOLIC BLOOD PRESSURE: 110 MMHG | WEIGHT: 168.9 LBS | DIASTOLIC BLOOD PRESSURE: 64 MMHG

## 2025-06-02 DIAGNOSIS — S90.122D CONTUSION OF FIFTH TOE OF LEFT FOOT, SUBSEQUENT ENCOUNTER: ICD-10-CM

## 2025-06-02 DIAGNOSIS — M79.672 LEFT FOOT PAIN: ICD-10-CM

## 2025-06-02 DIAGNOSIS — L70.9 ADULT ACNE: Primary | ICD-10-CM

## 2025-06-02 PROCEDURE — 99214 OFFICE O/P EST MOD 30 MIN: CPT | Performed by: STUDENT IN AN ORGANIZED HEALTH CARE EDUCATION/TRAINING PROGRAM

## 2025-06-02 PROCEDURE — 1036F TOBACCO NON-USER: CPT | Performed by: STUDENT IN AN ORGANIZED HEALTH CARE EDUCATION/TRAINING PROGRAM

## 2025-06-02 PROCEDURE — G8417 CALC BMI ABV UP PARAM F/U: HCPCS | Performed by: STUDENT IN AN ORGANIZED HEALTH CARE EDUCATION/TRAINING PROGRAM

## 2025-06-02 PROCEDURE — G8427 DOCREV CUR MEDS BY ELIG CLIN: HCPCS | Performed by: STUDENT IN AN ORGANIZED HEALTH CARE EDUCATION/TRAINING PROGRAM

## 2025-06-02 RX ORDER — CLINDAMYCIN AND BENZOYL PEROXIDE 10; 50 MG/G; MG/G
1 GEL TOPICAL 2 TIMES DAILY
Qty: 50 G | Refills: 2 | Status: SHIPPED | OUTPATIENT
Start: 2025-06-02 | End: 2025-08-31

## 2025-06-02 ASSESSMENT — PATIENT HEALTH QUESTIONNAIRE - PHQ9
2. FEELING DOWN, DEPRESSED OR HOPELESS: NOT AT ALL
SUM OF ALL RESPONSES TO PHQ QUESTIONS 1-9: 0
1. LITTLE INTEREST OR PLEASURE IN DOING THINGS: NOT AT ALL
SUM OF ALL RESPONSES TO PHQ QUESTIONS 1-9: 0

## 2025-06-02 NOTE — PROGRESS NOTES
Behavior: Behavior normal.       Physical Exam      ASSESSMENT & PLAN    Diagnoses and all orders for this visit:  Adult acne  -     clindamycin-benzoyl peroxide (BENZACLIN) 1-5 % gel; Apply 1 application  topically 2 times daily Apply topically 2 times daily.  Left foot pain  -     XR FOOT LEFT (MIN 3 VIEWS); Future  Contusion of fifth toe of left foot, subsequent encounter  -     XR FOOT LEFT (MIN 3 VIEWS); Future     Start benzaclin adult acne possible hormonal,   Lingering foot injury obtain imaging  Assessment & Plan      Return in about 1 year (around 6/2/2026).         The patient (or guardian, if applicable) and other individuals in attendance with the patient were advised that Artificial Intelligence will be utilized during this visit to record, process the conversation to generate a clinical note, and support improvement of the AI technology. The patient (or guardian, if applicable) and other individuals in attendance at the appointment consented to the use of AI, including the recording.                    Electronically signed by Hussain Stewart DO on 6/9/2025

## 2025-06-09 ASSESSMENT — ENCOUNTER SYMPTOMS
NAUSEA: 0
SORE THROAT: 0
ABDOMINAL PAIN: 0
WHEEZING: 0
SHORTNESS OF BREATH: 0

## 2025-07-23 RX ORDER — MELOXICAM 15 MG/1
15 TABLET ORAL DAILY
COMMUNITY
Start: 2024-12-06

## 2025-09-02 ENCOUNTER — TELEPHONE (OUTPATIENT)
Age: 30
End: 2025-09-02

## 2025-09-02 DIAGNOSIS — N94.6 DYSMENORRHEA: Primary | ICD-10-CM

## 2025-09-02 RX ORDER — MEDROXYPROGESTERONE ACETATE 150 MG/ML
150 INJECTION, SUSPENSION INTRAMUSCULAR
Qty: 1 EACH | Refills: 3 | Status: SHIPPED | OUTPATIENT
Start: 2025-09-02 | End: 2026-09-02

## 2025-09-04 ENCOUNTER — CLINICAL SUPPORT (OUTPATIENT)
Age: 30
End: 2025-09-04
Payer: COMMERCIAL

## 2025-09-04 VITALS
HEART RATE: 78 BPM | WEIGHT: 171.8 LBS | SYSTOLIC BLOOD PRESSURE: 122 MMHG | BODY MASS INDEX: 31.62 KG/M2 | DIASTOLIC BLOOD PRESSURE: 79 MMHG | HEIGHT: 62 IN

## 2025-09-04 DIAGNOSIS — N92.6 IRREGULAR MENSES: Primary | ICD-10-CM

## 2025-09-04 PROCEDURE — 96372 THER/PROPH/DIAG INJ SC/IM: CPT | Performed by: OBSTETRICS & GYNECOLOGY

## 2025-09-04 RX ORDER — MEDROXYPROGESTERONE ACETATE 150 MG/ML
150 INJECTION, SUSPENSION INTRAMUSCULAR ONCE
Status: COMPLETED | OUTPATIENT
Start: 2025-09-04 | End: 2025-09-04

## 2025-09-04 RX ADMIN — MEDROXYPROGESTERONE ACETATE 150 MG: 150 INJECTION, SUSPENSION INTRAMUSCULAR at 11:55

## 2025-09-04 SDOH — ECONOMIC STABILITY: FOOD INSECURITY: WITHIN THE PAST 12 MONTHS, THE FOOD YOU BOUGHT JUST DIDN'T LAST AND YOU DIDN'T HAVE MONEY TO GET MORE.: NEVER TRUE

## 2025-09-04 SDOH — ECONOMIC STABILITY: FOOD INSECURITY: WITHIN THE PAST 12 MONTHS, YOU WORRIED THAT YOUR FOOD WOULD RUN OUT BEFORE YOU GOT MONEY TO BUY MORE.: NEVER TRUE

## (undated) DEVICE — CHLORAPREP 26ML ORANGE

## (undated) DEVICE — PAD,ABDOMINAL,5"X9",ST,LF,25/BX: Brand: MEDLINE INDUSTRIES, INC.

## (undated) DEVICE — ZIMMER® STERILE DISPOSABLE TOURNIQUET CUFF WITH PLC, DUAL PORT, SINGLE BLADDER, 24 IN. (61 CM)

## (undated) DEVICE — Z INACTIVE USE 2660664 SOLUTION IRRIG 3000ML 0.9% SOD CHL USP UROMATIC PLAS CONT

## (undated) DEVICE — GLOVE SURG SZ 8 L12IN THK75MIL DK GRN LTX FREE

## (undated) DEVICE — TOWEL,OR,DSP,ST,BLUE,STD,6/PK,12PK/CS: Brand: MEDLINE

## (undated) DEVICE — SOLUTION IV IRRIG WATER 1000ML POUR BRL 2F7114

## (undated) DEVICE — GLOVE SURG SZ 65 L12IN FNGR THK79MIL GRN LTX FREE

## (undated) DEVICE — PADDING CAST W6INXL4YD COT COHESIVE HND TEARABLE SPEC 100

## (undated) DEVICE — PADDING CAST W6INXL4YD COT LO LINTING WYTEX

## (undated) DEVICE — GLOVE ORANGE PI 7 1/2   MSG9075

## (undated) DEVICE — DRAPE SHEET ULTRAGARD: Brand: MEDLINE

## (undated) DEVICE — GLOVE SURG SZ 65 CRM LTX FREE POLYISOPRENE POLYMER BEAD ANTI

## (undated) DEVICE — ARTHROSCOPY PACK: Brand: MEDLINE INDUSTRIES, INC.

## (undated) DEVICE — [AGGRESSIVE PLUS CUTTER, ARTHROSCOPIC SHAVER BLADE,  DO NOT RESTERILIZE,  DO NOT USE IF PACKAGE IS DAMAGED,  KEEP DRY,  KEEP AWAY FROM SUNLIGHT]: Brand: FORMULA

## (undated) DEVICE — WEREWOLF FLOW 50 COBLATION WAND: Brand: COBLATION

## (undated) DEVICE — DRESSING PETRO W3XL3IN OIL EMUL N ADH GZ KNIT IMPREG CELOS

## (undated) DEVICE — TUBING PMP L16FT MAIN DISP FOR AR-6400 AR-6475

## (undated) DEVICE — STERILE LATEX POWDER-FREE SURGICAL GLOVESWITH NITRILE COATING: Brand: PROTEXIS

## (undated) DEVICE — CONTAINER,SPECIMEN,OR STERILE,4OZ: Brand: MEDLINE

## (undated) DEVICE — SUTURE ETHLN SZ 3-0 L18IN NONABSORBABLE BLK FS-1 L24MM 3/8 663H

## (undated) DEVICE — ARTHROSCOPY PACK: Brand: CONVERTORS

## (undated) DEVICE — BANDAGE,ELASTIC,ESMARK,STERILE,6"X9',LF: Brand: MEDLINE

## (undated) DEVICE — SOLUTION SURG PREP PVP IOD 10% 8 OZ BTL SCRB CARE

## (undated) DEVICE — COUNTER NDL 30 COUNT FOAM STRP SGL MAG

## (undated) DEVICE — SYRINGE MED 30ML STD CLR PLAS LUERLOCK TIP N CTRL DISP

## (undated) DEVICE — GLOVE SURG SZ 6 CRM LTX FREE POLYISOPRENE POLYMER BEAD ANTI

## (undated) DEVICE — TUBING, SUCTION, 9/32" X 10', STRAIGHT: Brand: MEDLINE

## (undated) DEVICE — ELECTRODE ES AD CRDLSS PT RET REM POLYHESIVE

## (undated) DEVICE — BANDAGE COMPR M W6INXL10YD WHT BGE VELC E MTRX HK AND LOOP

## (undated) DEVICE — MAT ABSRB W28XL48IN C6L FLR ULT W POLY BK

## (undated) DEVICE — KNEE POSITIONING KIT: Brand: DEVON

## (undated) DEVICE — GOWN,ECLIPSE,POLYRNF,BRTHSLV,L,30/CS: Brand: MEDLINE